# Patient Record
Sex: FEMALE | Race: OTHER | NOT HISPANIC OR LATINO | Employment: OTHER | RURAL
[De-identification: names, ages, dates, MRNs, and addresses within clinical notes are randomized per-mention and may not be internally consistent; named-entity substitution may affect disease eponyms.]

---

## 2018-09-25 ENCOUNTER — HISTORICAL (OUTPATIENT)
Dept: ADMINISTRATIVE | Facility: HOSPITAL | Age: 68
End: 2018-09-25

## 2018-09-27 LAB
LAB AP CLINICAL INFORMATION: NORMAL
LAB AP GENERAL CAT - HISTORICAL: NORMAL
LAB AP INTERPRETATION/RESULT - HISTORICAL: NORMAL
LAB AP SPECIMEN ADEQUACY - HISTORICAL: NORMAL
LAB AP SPECIMEN SUBMITTED - HISTORICAL: NORMAL

## 2018-10-19 ENCOUNTER — HISTORICAL (OUTPATIENT)
Dept: ADMINISTRATIVE | Facility: HOSPITAL | Age: 68
End: 2018-10-19

## 2018-10-22 LAB
LAB AP CLINICAL INFORMATION: NORMAL
LAB AP DIAGNOSIS - HISTORICAL: NORMAL
LAB AP GROSS PATHOLOGY - HISTORICAL: NORMAL
LAB AP SPECIMEN SUBMITTED - HISTORICAL: NORMAL

## 2018-12-13 ENCOUNTER — HISTORICAL (OUTPATIENT)
Dept: ADMINISTRATIVE | Facility: HOSPITAL | Age: 68
End: 2018-12-13

## 2018-12-14 LAB
LAB AP CLINICAL INFORMATION: NORMAL
LAB AP GENERAL CAT - HISTORICAL: NORMAL
LAB AP INTERPRETATION/RESULT - HISTORICAL: NEGATIVE
LAB AP SPECIMEN ADEQUACY - HISTORICAL: NORMAL
LAB AP SPECIMEN SUBMITTED - HISTORICAL: NORMAL

## 2020-06-25 ENCOUNTER — HISTORICAL (OUTPATIENT)
Dept: ADMINISTRATIVE | Facility: HOSPITAL | Age: 70
End: 2020-06-25

## 2020-06-25 LAB
BASOPHILS # BLD AUTO: 0.03 X10E3/UL (ref 0–0.2)
BASOPHILS NFR BLD AUTO: 0.8 % (ref 0–1)
EOSINOPHIL # BLD AUTO: 0.1 X10E3/UL (ref 0–0.5)
EOSINOPHIL NFR BLD AUTO: 2.7 % (ref 1–4)
ERYTHROCYTE [DISTWIDTH] IN BLOOD BY AUTOMATED COUNT: 12.9 % (ref 11.5–14.5)
ERYTHROCYTE [SEDIMENTATION RATE] IN BLOOD BY WESTERGREN METHOD: 9 MM/HR (ref 0–30)
HCT VFR BLD AUTO: 41.6 % (ref 38–47)
HGB BLD-MCNC: 13 G/DL (ref 12–16)
IMM GRANULOCYTES # BLD AUTO: 0.01 X10E3/UL (ref 0–0.04)
IMM GRANULOCYTES NFR BLD: 0.3 % (ref 0–0.4)
LYMPHOCYTES # BLD AUTO: 1.24 X10E3/UL (ref 1–4.8)
LYMPHOCYTES NFR BLD AUTO: 33.7 % (ref 27–41)
MCH RBC QN AUTO: 29.3 PG (ref 27–31)
MCHC RBC AUTO-ENTMCNC: 31.3 G/DL (ref 32–36)
MCV RBC AUTO: 93.9 FL (ref 80–96)
MONOCYTES # BLD AUTO: 0.37 X10E3/UL (ref 0–0.8)
MONOCYTES NFR BLD AUTO: 10.1 % (ref 2–6)
MPC BLD CALC-MCNC: 12.8 FL (ref 9.4–12.4)
NEUTROPHILS # BLD AUTO: 1.93 X10E3/UL (ref 1.8–7.7)
NEUTROPHILS NFR BLD AUTO: 52.4 % (ref 53–65)
NRBC # BLD AUTO: 0 X10E3/UL (ref 0–0)
NRBC, AUTO (.00): 0 /100 (ref 0–0)
PLATELET # BLD AUTO: 232 X10E3/UL (ref 150–400)
RBC # BLD AUTO: 4.43 X10E6/UL (ref 4.2–5.4)
WBC # BLD AUTO: 3.68 X10E3/UL (ref 4.5–11)

## 2020-06-30 LAB — ANA SER QL: NEGATIVE

## 2020-08-05 ENCOUNTER — HISTORICAL (OUTPATIENT)
Dept: ADMINISTRATIVE | Facility: HOSPITAL | Age: 70
End: 2020-08-05

## 2020-09-09 ENCOUNTER — HISTORICAL (OUTPATIENT)
Dept: ADMINISTRATIVE | Facility: HOSPITAL | Age: 70
End: 2020-09-09

## 2021-02-02 ENCOUNTER — HISTORICAL (OUTPATIENT)
Dept: ADMINISTRATIVE | Facility: HOSPITAL | Age: 71
End: 2021-02-02

## 2021-02-02 LAB
25(OH)D3 SERPL-MCNC: 25.1 NG/ML
ALBUMIN SERPL BCP-MCNC: 3.9 G/DL (ref 3.5–5)
ALBUMIN/GLOB SERPL: 1.1 {RATIO}
ALP SERPL-CCNC: 63 U/L (ref 55–142)
ALT SERPL W P-5'-P-CCNC: 15 U/L (ref 13–56)
ANION GAP SERPL CALCULATED.3IONS-SCNC: 10.1 MMOL/L (ref 7–16)
AST SERPL W P-5'-P-CCNC: 22 U/L (ref 15–37)
BACTERIA #/AREA URNS HPF: ABNORMAL /HPF
BASOPHILS # BLD AUTO: 0.04 X10E3/UL (ref 0–0.2)
BASOPHILS NFR BLD AUTO: 0.9 % (ref 0–1)
BILIRUB SERPL-MCNC: 0.5 MG/DL (ref 0–1.2)
BILIRUB UR QL STRIP: NEGATIVE MG/DL
BUN SERPL-MCNC: 17 MG/DL (ref 7–18)
BUN/CREAT SERPL: 20
CALCIUM SERPL-MCNC: 8.9 MG/DL (ref 8.5–10.1)
CHLORIDE SERPL-SCNC: 104 MMOL/L (ref 98–107)
CHOLEST SERPL-MCNC: 224 MG/DL
CHOLEST/HDLC SERPL: 3.3 {RATIO}
CLARITY UR: CLEAR
CO2 SERPL-SCNC: 30 MMOL/L (ref 21–32)
COLOR UR: YELLOW
CREAT SERPL-MCNC: 0.84 MG/DL (ref 0.5–1.02)
EOSINOPHIL # BLD AUTO: 0.22 X10E3/UL (ref 0–0.5)
EOSINOPHIL NFR BLD AUTO: 5 % (ref 1–4)
ERYTHROCYTE [DISTWIDTH] IN BLOOD BY AUTOMATED COUNT: 12.9 % (ref 11.5–14.5)
GLOBULIN SER-MCNC: 3.4 G/DL (ref 2–4)
GLUCOSE SERPL-MCNC: 94 MG/DL (ref 74–106)
GLUCOSE UR STRIP-MCNC: NEGATIVE MG/DL
HCT VFR BLD AUTO: 39.5 % (ref 38–47)
HDLC SERPL-MCNC: 67 MG/DL
HGB BLD-MCNC: 12.9 G/DL (ref 12–16)
IMM GRANULOCYTES # BLD AUTO: 0.01 X10E3/UL (ref 0–0.04)
IMM GRANULOCYTES NFR BLD: 0.2 % (ref 0–0.4)
KETONES UR STRIP-SCNC: NEGATIVE MG/DL
LDLC SERPL CALC-MCNC: 133 MG/DL
LEUKOCYTE ESTERASE UR QL STRIP: NEGATIVE LEU/UL
LYMPHOCYTES # BLD AUTO: 1.24 X10E3/UL (ref 1–4.8)
LYMPHOCYTES NFR BLD AUTO: 28.1 % (ref 27–41)
MCH RBC QN AUTO: 30.5 PG (ref 27–31)
MCHC RBC AUTO-ENTMCNC: 32.7 G/DL (ref 32–36)
MCV RBC AUTO: 93.4 FL (ref 80–96)
MONOCYTES # BLD AUTO: 0.53 X10E3/UL (ref 0–0.8)
MONOCYTES NFR BLD AUTO: 12 % (ref 2–6)
MPC BLD CALC-MCNC: 12.8 FL (ref 9.4–12.4)
MUCOUS THREADS #/AREA URNS HPF: ABNORMAL /HPF
NEUTROPHILS # BLD AUTO: 2.38 X10E3/UL (ref 1.8–7.7)
NEUTROPHILS NFR BLD AUTO: 53.8 % (ref 53–65)
NITRITE UR QL STRIP: NEGATIVE
NRBC # BLD AUTO: 0 X10E3/UL (ref 0–0)
NRBC, AUTO (.00): 0 /100 (ref 0–0)
PH UR STRIP: 7 PH UNITS (ref 5–8)
PLATELET # BLD AUTO: 215 X10E3/UL (ref 150–400)
POTASSIUM SERPL-SCNC: 4.1 MMOL/L (ref 3.5–5.1)
PROT SERPL-MCNC: 7.3 G/DL (ref 6.4–8.2)
PROT UR QL STRIP: NEGATIVE MG/DL
RBC # BLD AUTO: 4.23 X10E6/UL (ref 4.2–5.4)
RBC # UR STRIP: NEGATIVE ERY/UL
RBC #/AREA URNS HPF: ABNORMAL /HPF (ref 0–3)
SODIUM SERPL-SCNC: 140 MMOL/L (ref 136–145)
SP GR UR STRIP: 1.01 (ref 1–1.03)
SQUAMOUS #/AREA URNS LPF: ABNORMAL /LPF
TRICHOMONAS #/AREA URNS HPF: ABNORMAL /HPF
TRIGL SERPL-MCNC: 118 MG/DL
UROBILINOGEN UR STRIP-ACNC: 0.2 EU/DL
WBC # BLD AUTO: 4.42 X10E3/UL (ref 4.5–11)
WBC #/AREA URNS HPF: ABNORMAL /HPF (ref 0–5)
YEAST #/AREA URNS HPF: ABNORMAL /HPF

## 2021-02-05 LAB
INSULIN SERPL-ACNC: NORMAL U[IU]/ML
LAB AP CLINICAL INFORMATION: NORMAL
LAB AP GENERAL CAT - HISTORICAL: NORMAL
LAB AP INTERPRETATION/RESULT - HISTORICAL: NEGATIVE
LAB AP SPECIMEN ADEQUACY - HISTORICAL: NORMAL
LAB AP SPECIMEN SUBMITTED - HISTORICAL: NORMAL

## 2021-02-18 ENCOUNTER — HISTORICAL (OUTPATIENT)
Dept: ADMINISTRATIVE | Facility: HOSPITAL | Age: 71
End: 2021-02-18

## 2021-03-31 ENCOUNTER — OFFICE VISIT (OUTPATIENT)
Dept: INTERNAL MEDICINE | Facility: CLINIC | Age: 71
End: 2021-03-31
Payer: COMMERCIAL

## 2021-03-31 VITALS
HEART RATE: 65 BPM | DIASTOLIC BLOOD PRESSURE: 74 MMHG | BODY MASS INDEX: 30.9 KG/M2 | RESPIRATION RATE: 18 BRPM | SYSTOLIC BLOOD PRESSURE: 148 MMHG | WEIGHT: 174.38 LBS | OXYGEN SATURATION: 100 % | HEIGHT: 63 IN

## 2021-03-31 DIAGNOSIS — E78.5 HYPERLIPIDEMIA, UNSPECIFIED HYPERLIPIDEMIA TYPE: Chronic | ICD-10-CM

## 2021-03-31 DIAGNOSIS — I10 ESSENTIAL HYPERTENSION: Chronic | ICD-10-CM

## 2021-03-31 DIAGNOSIS — K21.9 GASTROESOPHAGEAL REFLUX DISEASE, UNSPECIFIED WHETHER ESOPHAGITIS PRESENT: Chronic | ICD-10-CM

## 2021-03-31 DIAGNOSIS — M62.838 MUSCLE SPASM: ICD-10-CM

## 2021-03-31 DIAGNOSIS — M54.50 BILATERAL LOW BACK PAIN WITHOUT SCIATICA, UNSPECIFIED CHRONICITY: Primary | ICD-10-CM

## 2021-03-31 PROCEDURE — 1101F PR PT FALLS ASSESS DOC 0-1 FALLS W/OUT INJ PAST YR: ICD-10-PCS | Mod: CPTII,,, | Performed by: INTERNAL MEDICINE

## 2021-03-31 PROCEDURE — 3288F PR FALLS RISK ASSESSMENT DOCUMENTED: ICD-10-PCS | Mod: CPTII,,, | Performed by: INTERNAL MEDICINE

## 2021-03-31 PROCEDURE — 3288F FALL RISK ASSESSMENT DOCD: CPT | Mod: CPTII,,, | Performed by: INTERNAL MEDICINE

## 2021-03-31 PROCEDURE — 3008F PR BODY MASS INDEX (BMI) DOCUMENTED: ICD-10-PCS | Mod: CPTII,,, | Performed by: INTERNAL MEDICINE

## 2021-03-31 PROCEDURE — 99214 OFFICE O/P EST MOD 30 MIN: CPT | Mod: S$PBB,,, | Performed by: INTERNAL MEDICINE

## 2021-03-31 PROCEDURE — 99999 PR PBB SHADOW E&M-EST. PATIENT-LVL IV: ICD-10-PCS | Mod: PBBFAC,,, | Performed by: INTERNAL MEDICINE

## 2021-03-31 PROCEDURE — 3078F PR MOST RECENT DIASTOLIC BLOOD PRESSURE < 80 MM HG: ICD-10-PCS | Mod: CPTII,,, | Performed by: INTERNAL MEDICINE

## 2021-03-31 PROCEDURE — 99999 PR PBB SHADOW E&M-EST. PATIENT-LVL IV: CPT | Mod: PBBFAC,,, | Performed by: INTERNAL MEDICINE

## 2021-03-31 PROCEDURE — 1159F MED LIST DOCD IN RCRD: CPT | Mod: ,,, | Performed by: INTERNAL MEDICINE

## 2021-03-31 PROCEDURE — 3077F SYST BP >= 140 MM HG: CPT | Mod: CPTII,,, | Performed by: INTERNAL MEDICINE

## 2021-03-31 PROCEDURE — 3008F BODY MASS INDEX DOCD: CPT | Mod: CPTII,,, | Performed by: INTERNAL MEDICINE

## 2021-03-31 PROCEDURE — 1125F AMNT PAIN NOTED PAIN PRSNT: CPT | Mod: ,,, | Performed by: INTERNAL MEDICINE

## 2021-03-31 PROCEDURE — 99214 OFFICE O/P EST MOD 30 MIN: CPT | Mod: PBBFAC | Performed by: INTERNAL MEDICINE

## 2021-03-31 PROCEDURE — 3077F PR MOST RECENT SYSTOLIC BLOOD PRESSURE >= 140 MM HG: ICD-10-PCS | Mod: CPTII,,, | Performed by: INTERNAL MEDICINE

## 2021-03-31 PROCEDURE — 1101F PT FALLS ASSESS-DOCD LE1/YR: CPT | Mod: CPTII,,, | Performed by: INTERNAL MEDICINE

## 2021-03-31 PROCEDURE — 99214 PR OFFICE/OUTPT VISIT, EST, LEVL IV, 30-39 MIN: ICD-10-PCS | Mod: S$PBB,,, | Performed by: INTERNAL MEDICINE

## 2021-03-31 PROCEDURE — 3078F DIAST BP <80 MM HG: CPT | Mod: CPTII,,, | Performed by: INTERNAL MEDICINE

## 2021-03-31 PROCEDURE — 1159F PR MEDICATION LIST DOCUMENTED IN MEDICAL RECORD: ICD-10-PCS | Mod: ,,, | Performed by: INTERNAL MEDICINE

## 2021-03-31 PROCEDURE — 1125F PR PAIN SEVERITY QUANTIFIED, PAIN PRESENT: ICD-10-PCS | Mod: ,,, | Performed by: INTERNAL MEDICINE

## 2021-03-31 RX ORDER — OMEPRAZOLE 40 MG/1
40 CAPSULE, DELAYED RELEASE ORAL DAILY
COMMUNITY
Start: 2021-01-09 | End: 2021-10-05 | Stop reason: SDUPTHER

## 2021-03-31 RX ORDER — NIFEDIPINE 60 MG/1
60 TABLET, EXTENDED RELEASE ORAL DAILY
COMMUNITY
Start: 2021-03-05 | End: 2021-09-09 | Stop reason: SDUPTHER

## 2021-03-31 RX ORDER — AMITRIPTYLINE HYDROCHLORIDE 25 MG/1
1 TABLET, FILM COATED ORAL NIGHTLY
COMMUNITY
Start: 2021-01-21 | End: 2021-08-26

## 2021-03-31 RX ORDER — CYCLOBENZAPRINE HCL 10 MG
10 TABLET ORAL 2 TIMES DAILY PRN
Qty: 20 TABLET | Refills: 0 | Status: SHIPPED | OUTPATIENT
Start: 2021-03-31 | End: 2021-04-10

## 2021-03-31 RX ORDER — ATORVASTATIN CALCIUM 10 MG/1
TABLET, FILM COATED ORAL
COMMUNITY
Start: 2021-01-21 | End: 2021-08-26

## 2021-03-31 RX ORDER — GABAPENTIN 300 MG/1
300 CAPSULE ORAL 3 TIMES DAILY
COMMUNITY
Start: 2021-03-05 | End: 2021-06-16 | Stop reason: SDUPTHER

## 2021-04-12 DIAGNOSIS — E55.9 VITAMIN D DEFICIENCY: Primary | ICD-10-CM

## 2021-04-14 ENCOUNTER — TELEPHONE (OUTPATIENT)
Dept: NEUROLOGY | Facility: CLINIC | Age: 71
End: 2021-04-14

## 2021-04-16 ENCOUNTER — TELEPHONE (OUTPATIENT)
Dept: NEUROLOGY | Facility: CLINIC | Age: 71
End: 2021-04-16

## 2021-05-13 ENCOUNTER — TELEPHONE (OUTPATIENT)
Dept: OBSTETRICS AND GYNECOLOGY | Facility: CLINIC | Age: 71
End: 2021-05-13

## 2021-05-18 ENCOUNTER — TELEPHONE (OUTPATIENT)
Dept: OBSTETRICS AND GYNECOLOGY | Facility: CLINIC | Age: 71
End: 2021-05-18

## 2021-06-10 RX ORDER — ESCITALOPRAM OXALATE 10 MG/1
TABLET ORAL
COMMUNITY
Start: 2021-05-06 | End: 2021-06-10 | Stop reason: SDUPTHER

## 2021-06-10 RX ORDER — ESCITALOPRAM OXALATE 10 MG/1
10 TABLET ORAL DAILY
Qty: 90 TABLET | Refills: 1 | Status: SHIPPED | OUTPATIENT
Start: 2021-06-10 | End: 2021-08-26 | Stop reason: SDUPTHER

## 2021-06-16 ENCOUNTER — OFFICE VISIT (OUTPATIENT)
Dept: NEUROLOGY | Facility: CLINIC | Age: 71
End: 2021-06-16
Payer: COMMERCIAL

## 2021-06-16 VITALS
SYSTOLIC BLOOD PRESSURE: 142 MMHG | DIASTOLIC BLOOD PRESSURE: 86 MMHG | WEIGHT: 179.5 LBS | HEIGHT: 63 IN | OXYGEN SATURATION: 98 % | HEART RATE: 62 BPM | BODY MASS INDEX: 31.8 KG/M2

## 2021-06-16 DIAGNOSIS — G25.81 RLS (RESTLESS LEGS SYNDROME): ICD-10-CM

## 2021-06-16 DIAGNOSIS — G62.9 POLYNEUROPATHY: Primary | ICD-10-CM

## 2021-06-16 PROCEDURE — 1159F MED LIST DOCD IN RCRD: CPT | Mod: ,,, | Performed by: NURSE PRACTITIONER

## 2021-06-16 PROCEDURE — 99214 OFFICE O/P EST MOD 30 MIN: CPT | Mod: PBBFAC | Performed by: NURSE PRACTITIONER

## 2021-06-16 PROCEDURE — 3008F PR BODY MASS INDEX (BMI) DOCUMENTED: ICD-10-PCS | Mod: CPTII,,, | Performed by: NURSE PRACTITIONER

## 2021-06-16 PROCEDURE — 1101F PT FALLS ASSESS-DOCD LE1/YR: CPT | Mod: CPTII,,, | Performed by: NURSE PRACTITIONER

## 2021-06-16 PROCEDURE — 1101F PR PT FALLS ASSESS DOC 0-1 FALLS W/OUT INJ PAST YR: ICD-10-PCS | Mod: CPTII,,, | Performed by: NURSE PRACTITIONER

## 2021-06-16 PROCEDURE — 99213 OFFICE O/P EST LOW 20 MIN: CPT | Mod: S$PBB,,, | Performed by: NURSE PRACTITIONER

## 2021-06-16 PROCEDURE — 3008F BODY MASS INDEX DOCD: CPT | Mod: CPTII,,, | Performed by: NURSE PRACTITIONER

## 2021-06-16 PROCEDURE — 3288F PR FALLS RISK ASSESSMENT DOCUMENTED: ICD-10-PCS | Mod: CPTII,,, | Performed by: NURSE PRACTITIONER

## 2021-06-16 PROCEDURE — 1159F PR MEDICATION LIST DOCUMENTED IN MEDICAL RECORD: ICD-10-PCS | Mod: ,,, | Performed by: NURSE PRACTITIONER

## 2021-06-16 PROCEDURE — 3288F FALL RISK ASSESSMENT DOCD: CPT | Mod: CPTII,,, | Performed by: NURSE PRACTITIONER

## 2021-06-16 PROCEDURE — 99213 PR OFFICE/OUTPT VISIT, EST, LEVL III, 20-29 MIN: ICD-10-PCS | Mod: S$PBB,,, | Performed by: NURSE PRACTITIONER

## 2021-06-16 RX ORDER — GABAPENTIN 300 MG/1
CAPSULE ORAL
Qty: 120 CAPSULE | Refills: 3 | Status: SHIPPED | OUTPATIENT
Start: 2021-06-16 | End: 2021-09-16 | Stop reason: ALTCHOICE

## 2021-08-26 ENCOUNTER — OFFICE VISIT (OUTPATIENT)
Dept: OBSTETRICS AND GYNECOLOGY | Facility: CLINIC | Age: 71
End: 2021-08-26
Payer: MEDICARE

## 2021-08-26 VITALS
WEIGHT: 174 LBS | DIASTOLIC BLOOD PRESSURE: 88 MMHG | TEMPERATURE: 97 F | SYSTOLIC BLOOD PRESSURE: 176 MMHG | HEIGHT: 63 IN | RESPIRATION RATE: 16 BRPM | HEART RATE: 61 BPM | BODY MASS INDEX: 30.83 KG/M2

## 2021-08-26 DIAGNOSIS — G62.9 NEUROPATHY: ICD-10-CM

## 2021-08-26 DIAGNOSIS — E55.9 VITAMIN D DEFICIENCY: ICD-10-CM

## 2021-08-26 DIAGNOSIS — M79.604 LEG PAIN, BILATERAL: Primary | ICD-10-CM

## 2021-08-26 DIAGNOSIS — M79.605 LEG PAIN, BILATERAL: Primary | ICD-10-CM

## 2021-08-26 LAB
25(OH)D3 SERPL-MCNC: 61.5 NG/ML
ALBUMIN SERPL BCP-MCNC: 3.8 G/DL (ref 3.5–5)
ALBUMIN/GLOB SERPL: 1 {RATIO}
ALP SERPL-CCNC: 55 U/L (ref 55–142)
ALT SERPL W P-5'-P-CCNC: 11 U/L (ref 13–56)
ANION GAP SERPL CALCULATED.3IONS-SCNC: 8 MMOL/L (ref 7–16)
AST SERPL W P-5'-P-CCNC: 19 U/L (ref 15–37)
BASOPHILS # BLD AUTO: 0.04 K/UL (ref 0–0.2)
BASOPHILS NFR BLD AUTO: 1 % (ref 0–1)
BILIRUB SERPL-MCNC: 0.5 MG/DL (ref 0–1.2)
BUN SERPL-MCNC: 12 MG/DL (ref 7–18)
BUN/CREAT SERPL: 15 (ref 6–20)
CALCIUM SERPL-MCNC: 9.3 MG/DL (ref 8.5–10.1)
CHLORIDE SERPL-SCNC: 105 MMOL/L (ref 98–107)
CO2 SERPL-SCNC: 32 MMOL/L (ref 21–32)
CREAT SERPL-MCNC: 0.82 MG/DL (ref 0.55–1.02)
DIFFERENTIAL METHOD BLD: ABNORMAL
EOSINOPHIL # BLD AUTO: 0.1 K/UL (ref 0–0.5)
EOSINOPHIL NFR BLD AUTO: 2.5 % (ref 1–4)
ERYTHROCYTE [DISTWIDTH] IN BLOOD BY AUTOMATED COUNT: 12.9 % (ref 11.5–14.5)
EST. AVERAGE GLUCOSE BLD GHB EST-MCNC: 84 MG/DL
GLOBULIN SER-MCNC: 4 G/DL (ref 2–4)
GLUCOSE SERPL-MCNC: 86 MG/DL (ref 74–106)
HBA1C MFR BLD HPLC: 5.1 % (ref 4.5–6.6)
HCT VFR BLD AUTO: 41.3 % (ref 38–47)
HGB BLD-MCNC: 13.4 G/DL (ref 12–16)
IMM GRANULOCYTES # BLD AUTO: 0.01 K/UL (ref 0–0.04)
IMM GRANULOCYTES NFR BLD: 0.3 % (ref 0–0.4)
LYMPHOCYTES # BLD AUTO: 1.31 K/UL (ref 1–4.8)
LYMPHOCYTES NFR BLD AUTO: 33.2 % (ref 27–41)
MCH RBC QN AUTO: 29.7 PG (ref 27–31)
MCHC RBC AUTO-ENTMCNC: 32.4 G/DL (ref 32–36)
MCV RBC AUTO: 91.6 FL (ref 80–96)
MONOCYTES # BLD AUTO: 0.4 K/UL (ref 0–0.8)
MONOCYTES NFR BLD AUTO: 10.2 % (ref 2–6)
MPC BLD CALC-MCNC: 12.3 FL (ref 9.4–12.4)
NEUTROPHILS # BLD AUTO: 2.08 K/UL (ref 1.8–7.7)
NEUTROPHILS NFR BLD AUTO: 52.8 % (ref 53–65)
NRBC # BLD AUTO: 0 X10E3/UL
NRBC, AUTO (.00): 0 %
PLATELET # BLD AUTO: 233 K/UL (ref 150–400)
POTASSIUM SERPL-SCNC: 3.8 MMOL/L (ref 3.5–5.1)
PROT SERPL-MCNC: 7.8 G/DL (ref 6.4–8.2)
RBC # BLD AUTO: 4.51 M/UL (ref 4.2–5.4)
SODIUM SERPL-SCNC: 141 MMOL/L (ref 136–145)
T4 FREE SERPL-MCNC: 0.92 NG/DL (ref 0.76–1.46)
TSH SERPL DL<=0.005 MIU/L-ACNC: 1.7 UIU/ML (ref 0.36–3.74)
WBC # BLD AUTO: 3.94 K/UL (ref 4.5–11)

## 2021-08-26 PROCEDURE — 99214 PR OFFICE/OUTPT VISIT, EST, LEVL IV, 30-39 MIN: ICD-10-PCS | Mod: ,,, | Performed by: OBSTETRICS & GYNECOLOGY

## 2021-08-26 PROCEDURE — 36415 PR COLLECTION VENOUS BLOOD,VENIPUNCTURE: ICD-10-PCS | Mod: ,,, | Performed by: OBSTETRICS & GYNECOLOGY

## 2021-08-26 PROCEDURE — 85025 CBC WITH DIFFERENTIAL: ICD-10-PCS | Mod: ,,, | Performed by: CLINICAL MEDICAL LABORATORY

## 2021-08-26 PROCEDURE — 84443 THYROID PANEL: ICD-10-PCS | Mod: GZ,,, | Performed by: CLINICAL MEDICAL LABORATORY

## 2021-08-26 PROCEDURE — 85025 COMPLETE CBC W/AUTO DIFF WBC: CPT | Mod: ,,, | Performed by: CLINICAL MEDICAL LABORATORY

## 2021-08-26 PROCEDURE — 83036 HEMOGLOBIN A1C: ICD-10-PCS | Mod: GZ,,, | Performed by: CLINICAL MEDICAL LABORATORY

## 2021-08-26 PROCEDURE — 82306 VITAMIN D: ICD-10-PCS | Mod: ,,, | Performed by: CLINICAL MEDICAL LABORATORY

## 2021-08-26 PROCEDURE — 82306 VITAMIN D 25 HYDROXY: CPT | Mod: ,,, | Performed by: CLINICAL MEDICAL LABORATORY

## 2021-08-26 PROCEDURE — 80053 COMPREHEN METABOLIC PANEL: CPT | Mod: ,,, | Performed by: CLINICAL MEDICAL LABORATORY

## 2021-08-26 PROCEDURE — 80053 COMPREHENSIVE METABOLIC PANEL: ICD-10-PCS | Mod: ,,, | Performed by: CLINICAL MEDICAL LABORATORY

## 2021-08-26 PROCEDURE — 99214 OFFICE O/P EST MOD 30 MIN: CPT | Mod: ,,, | Performed by: OBSTETRICS & GYNECOLOGY

## 2021-08-26 PROCEDURE — 83036 HEMOGLOBIN GLYCOSYLATED A1C: CPT | Mod: GZ,,, | Performed by: CLINICAL MEDICAL LABORATORY

## 2021-08-26 PROCEDURE — 36415 COLL VENOUS BLD VENIPUNCTURE: CPT | Mod: ,,, | Performed by: OBSTETRICS & GYNECOLOGY

## 2021-08-26 PROCEDURE — 84439 ASSAY OF FREE THYROXINE: CPT | Mod: GZ,,, | Performed by: CLINICAL MEDICAL LABORATORY

## 2021-08-26 PROCEDURE — 84443 ASSAY THYROID STIM HORMONE: CPT | Mod: GZ,,, | Performed by: CLINICAL MEDICAL LABORATORY

## 2021-08-26 PROCEDURE — 84439 THYROID PANEL: ICD-10-PCS | Mod: GZ,,, | Performed by: CLINICAL MEDICAL LABORATORY

## 2021-08-26 RX ORDER — ALENDRONATE SODIUM 70 MG/1
70 TABLET ORAL
COMMUNITY
End: 2021-08-26 | Stop reason: SDUPTHER

## 2021-08-26 RX ORDER — ESCITALOPRAM OXALATE 10 MG/1
10 TABLET ORAL DAILY
Qty: 90 TABLET | Refills: 3 | Status: SHIPPED | OUTPATIENT
Start: 2021-08-26 | End: 2022-03-10

## 2021-08-26 RX ORDER — DICLOFENAC POTASSIUM 50 MG/1
50 TABLET, FILM COATED ORAL 4 TIMES DAILY
Qty: 120 TABLET | Refills: 1 | Status: SHIPPED | OUTPATIENT
Start: 2021-08-26 | End: 2021-09-25

## 2021-08-26 RX ORDER — ALENDRONATE SODIUM 70 MG/1
70 TABLET ORAL
Qty: 12 TABLET | Refills: 3 | Status: SHIPPED | OUTPATIENT
Start: 2021-08-26 | End: 2022-06-01 | Stop reason: SDUPTHER

## 2021-09-02 ENCOUNTER — OFFICE VISIT (OUTPATIENT)
Dept: OBSTETRICS AND GYNECOLOGY | Facility: CLINIC | Age: 71
End: 2021-09-02
Payer: MEDICARE

## 2021-09-02 VITALS
BODY MASS INDEX: 31.01 KG/M2 | WEIGHT: 175 LBS | RESPIRATION RATE: 16 BRPM | TEMPERATURE: 97 F | SYSTOLIC BLOOD PRESSURE: 169 MMHG | DIASTOLIC BLOOD PRESSURE: 94 MMHG | HEART RATE: 59 BPM | HEIGHT: 63 IN

## 2021-09-02 DIAGNOSIS — M79.606 LEG PAIN, DIFFUSE, UNSPECIFIED LATERALITY: Primary | ICD-10-CM

## 2021-09-02 DIAGNOSIS — D72.819 LEUKOPENIA, UNSPECIFIED TYPE: ICD-10-CM

## 2021-09-02 PROCEDURE — 1159F PR MEDICATION LIST DOCUMENTED IN MEDICAL RECORD: ICD-10-PCS | Mod: CPTII,,, | Performed by: OBSTETRICS & GYNECOLOGY

## 2021-09-02 PROCEDURE — 3044F HG A1C LEVEL LT 7.0%: CPT | Mod: CPTII,,, | Performed by: OBSTETRICS & GYNECOLOGY

## 2021-09-02 PROCEDURE — 3077F PR MOST RECENT SYSTOLIC BLOOD PRESSURE >= 140 MM HG: ICD-10-PCS | Mod: CPTII,,, | Performed by: OBSTETRICS & GYNECOLOGY

## 2021-09-02 PROCEDURE — 99214 PR OFFICE/OUTPT VISIT, EST, LEVL IV, 30-39 MIN: ICD-10-PCS | Mod: ,,, | Performed by: OBSTETRICS & GYNECOLOGY

## 2021-09-02 PROCEDURE — 3080F PR MOST RECENT DIASTOLIC BLOOD PRESSURE >= 90 MM HG: ICD-10-PCS | Mod: CPTII,,, | Performed by: OBSTETRICS & GYNECOLOGY

## 2021-09-02 PROCEDURE — 1160F PR REVIEW ALL MEDS BY PRESCRIBER/CLIN PHARMACIST DOCUMENTED: ICD-10-PCS | Mod: CPTII,,, | Performed by: OBSTETRICS & GYNECOLOGY

## 2021-09-02 PROCEDURE — 3080F DIAST BP >= 90 MM HG: CPT | Mod: CPTII,,, | Performed by: OBSTETRICS & GYNECOLOGY

## 2021-09-02 PROCEDURE — 3044F PR MOST RECENT HEMOGLOBIN A1C LEVEL <7.0%: ICD-10-PCS | Mod: CPTII,,, | Performed by: OBSTETRICS & GYNECOLOGY

## 2021-09-02 PROCEDURE — 3008F BODY MASS INDEX DOCD: CPT | Mod: CPTII,,, | Performed by: OBSTETRICS & GYNECOLOGY

## 2021-09-02 PROCEDURE — 1160F RVW MEDS BY RX/DR IN RCRD: CPT | Mod: CPTII,,, | Performed by: OBSTETRICS & GYNECOLOGY

## 2021-09-02 PROCEDURE — 3077F SYST BP >= 140 MM HG: CPT | Mod: CPTII,,, | Performed by: OBSTETRICS & GYNECOLOGY

## 2021-09-02 PROCEDURE — 99214 OFFICE O/P EST MOD 30 MIN: CPT | Mod: ,,, | Performed by: OBSTETRICS & GYNECOLOGY

## 2021-09-02 PROCEDURE — 1159F MED LIST DOCD IN RCRD: CPT | Mod: CPTII,,, | Performed by: OBSTETRICS & GYNECOLOGY

## 2021-09-02 PROCEDURE — 3008F PR BODY MASS INDEX (BMI) DOCUMENTED: ICD-10-PCS | Mod: CPTII,,, | Performed by: OBSTETRICS & GYNECOLOGY

## 2021-09-09 RX ORDER — NIFEDIPINE 60 MG/1
60 TABLET, EXTENDED RELEASE ORAL DAILY
Qty: 30 TABLET | Refills: 0 | Status: SHIPPED | OUTPATIENT
Start: 2021-09-09 | End: 2021-10-05 | Stop reason: SDUPTHER

## 2021-09-14 ENCOUNTER — HOSPITAL ENCOUNTER (OUTPATIENT)
Dept: RADIOLOGY | Facility: HOSPITAL | Age: 71
Discharge: HOME | End: 2021-09-14
Payer: MEDICARE

## 2021-09-14 DIAGNOSIS — Z12.31 ENCOUNTER FOR SCREENING MAMMOGRAM FOR MALIGNANT NEOPLASM OF BREAST: ICD-10-CM

## 2021-09-14 PROCEDURE — 77067 SCR MAMMO BI INCL CAD: CPT | Mod: TC

## 2021-09-16 ENCOUNTER — OFFICE VISIT (OUTPATIENT)
Dept: NEUROLOGY | Facility: CLINIC | Age: 71
End: 2021-09-16
Payer: MEDICARE

## 2021-09-16 VITALS
BODY MASS INDEX: 31.47 KG/M2 | HEART RATE: 58 BPM | HEIGHT: 63 IN | SYSTOLIC BLOOD PRESSURE: 144 MMHG | OXYGEN SATURATION: 99 % | WEIGHT: 177.63 LBS | DIASTOLIC BLOOD PRESSURE: 72 MMHG

## 2021-09-16 DIAGNOSIS — G25.81 RLS (RESTLESS LEGS SYNDROME): ICD-10-CM

## 2021-09-16 DIAGNOSIS — G56.03 BILATERAL CARPAL TUNNEL SYNDROME: ICD-10-CM

## 2021-09-16 DIAGNOSIS — G62.9 POLYNEUROPATHY: Primary | ICD-10-CM

## 2021-09-16 PROCEDURE — 3288F PR FALLS RISK ASSESSMENT DOCUMENTED: ICD-10-PCS | Mod: CPTII,,, | Performed by: NURSE PRACTITIONER

## 2021-09-16 PROCEDURE — 1101F PT FALLS ASSESS-DOCD LE1/YR: CPT | Mod: CPTII,,, | Performed by: NURSE PRACTITIONER

## 2021-09-16 PROCEDURE — 3078F PR MOST RECENT DIASTOLIC BLOOD PRESSURE < 80 MM HG: ICD-10-PCS | Mod: CPTII,,, | Performed by: NURSE PRACTITIONER

## 2021-09-16 PROCEDURE — 1160F RVW MEDS BY RX/DR IN RCRD: CPT | Mod: CPTII,,, | Performed by: NURSE PRACTITIONER

## 2021-09-16 PROCEDURE — 1160F PR REVIEW ALL MEDS BY PRESCRIBER/CLIN PHARMACIST DOCUMENTED: ICD-10-PCS | Mod: CPTII,,, | Performed by: NURSE PRACTITIONER

## 2021-09-16 PROCEDURE — 1159F PR MEDICATION LIST DOCUMENTED IN MEDICAL RECORD: ICD-10-PCS | Mod: CPTII,,, | Performed by: NURSE PRACTITIONER

## 2021-09-16 PROCEDURE — 3288F FALL RISK ASSESSMENT DOCD: CPT | Mod: CPTII,,, | Performed by: NURSE PRACTITIONER

## 2021-09-16 PROCEDURE — 1159F MED LIST DOCD IN RCRD: CPT | Mod: CPTII,,, | Performed by: NURSE PRACTITIONER

## 2021-09-16 PROCEDURE — 3077F SYST BP >= 140 MM HG: CPT | Mod: CPTII,,, | Performed by: NURSE PRACTITIONER

## 2021-09-16 PROCEDURE — 3008F BODY MASS INDEX DOCD: CPT | Mod: CPTII,,, | Performed by: NURSE PRACTITIONER

## 2021-09-16 PROCEDURE — 3078F DIAST BP <80 MM HG: CPT | Mod: CPTII,,, | Performed by: NURSE PRACTITIONER

## 2021-09-16 PROCEDURE — 3077F PR MOST RECENT SYSTOLIC BLOOD PRESSURE >= 140 MM HG: ICD-10-PCS | Mod: CPTII,,, | Performed by: NURSE PRACTITIONER

## 2021-09-16 PROCEDURE — 99215 OFFICE O/P EST HI 40 MIN: CPT | Mod: PBBFAC | Performed by: NURSE PRACTITIONER

## 2021-09-16 PROCEDURE — 3044F HG A1C LEVEL LT 7.0%: CPT | Mod: CPTII,,, | Performed by: NURSE PRACTITIONER

## 2021-09-16 PROCEDURE — 3044F PR MOST RECENT HEMOGLOBIN A1C LEVEL <7.0%: ICD-10-PCS | Mod: CPTII,,, | Performed by: NURSE PRACTITIONER

## 2021-09-16 PROCEDURE — 99213 PR OFFICE/OUTPT VISIT, EST, LEVL III, 20-29 MIN: ICD-10-PCS | Mod: S$PBB,,, | Performed by: NURSE PRACTITIONER

## 2021-09-16 PROCEDURE — 3008F PR BODY MASS INDEX (BMI) DOCUMENTED: ICD-10-PCS | Mod: CPTII,,, | Performed by: NURSE PRACTITIONER

## 2021-09-16 PROCEDURE — 99213 OFFICE O/P EST LOW 20 MIN: CPT | Mod: S$PBB,,, | Performed by: NURSE PRACTITIONER

## 2021-09-16 PROCEDURE — 1101F PR PT FALLS ASSESS DOC 0-1 FALLS W/OUT INJ PAST YR: ICD-10-PCS | Mod: CPTII,,, | Performed by: NURSE PRACTITIONER

## 2021-09-16 RX ORDER — CARBIDOPA AND LEVODOPA 25; 100 MG/1; MG/1
TABLET ORAL
COMMUNITY
Start: 2021-05-26 | End: 2021-09-16

## 2021-09-16 RX ORDER — ATORVASTATIN CALCIUM 20 MG/1
TABLET, FILM COATED ORAL
COMMUNITY
Start: 2021-04-26

## 2021-09-16 RX ORDER — PREGABALIN 75 MG/1
CAPSULE ORAL
Qty: 60 CAPSULE | Refills: 3 | Status: SHIPPED | OUTPATIENT
Start: 2021-09-16 | End: 2021-12-20 | Stop reason: SDUPTHER

## 2021-10-05 ENCOUNTER — OFFICE VISIT (OUTPATIENT)
Dept: FAMILY MEDICINE | Facility: CLINIC | Age: 71
End: 2021-10-05
Payer: MEDICARE

## 2021-10-05 VITALS
DIASTOLIC BLOOD PRESSURE: 75 MMHG | RESPIRATION RATE: 20 BRPM | SYSTOLIC BLOOD PRESSURE: 143 MMHG | HEART RATE: 67 BPM | OXYGEN SATURATION: 96 % | HEIGHT: 63 IN | WEIGHT: 178 LBS | BODY MASS INDEX: 31.54 KG/M2

## 2021-10-05 DIAGNOSIS — K21.9 GASTROESOPHAGEAL REFLUX DISEASE, UNSPECIFIED WHETHER ESOPHAGITIS PRESENT: ICD-10-CM

## 2021-10-05 DIAGNOSIS — I10 HYPERTENSION, UNSPECIFIED TYPE: Primary | ICD-10-CM

## 2021-10-05 DIAGNOSIS — E78.5 DYSLIPIDEMIA: ICD-10-CM

## 2021-10-05 PROCEDURE — 3008F PR BODY MASS INDEX (BMI) DOCUMENTED: ICD-10-PCS | Mod: ,,, | Performed by: INTERNAL MEDICINE

## 2021-10-05 PROCEDURE — 3044F HG A1C LEVEL LT 7.0%: CPT | Mod: ,,, | Performed by: INTERNAL MEDICINE

## 2021-10-05 PROCEDURE — 99213 PR OFFICE/OUTPT VISIT, EST, LEVL III, 20-29 MIN: ICD-10-PCS | Mod: ,,, | Performed by: INTERNAL MEDICINE

## 2021-10-05 PROCEDURE — 3078F DIAST BP <80 MM HG: CPT | Mod: ,,, | Performed by: INTERNAL MEDICINE

## 2021-10-05 PROCEDURE — 3044F PR MOST RECENT HEMOGLOBIN A1C LEVEL <7.0%: ICD-10-PCS | Mod: ,,, | Performed by: INTERNAL MEDICINE

## 2021-10-05 PROCEDURE — 3078F PR MOST RECENT DIASTOLIC BLOOD PRESSURE < 80 MM HG: ICD-10-PCS | Mod: ,,, | Performed by: INTERNAL MEDICINE

## 2021-10-05 PROCEDURE — 3077F SYST BP >= 140 MM HG: CPT | Mod: ,,, | Performed by: INTERNAL MEDICINE

## 2021-10-05 PROCEDURE — 1101F PT FALLS ASSESS-DOCD LE1/YR: CPT | Mod: ,,, | Performed by: INTERNAL MEDICINE

## 2021-10-05 PROCEDURE — 3288F FALL RISK ASSESSMENT DOCD: CPT | Mod: ,,, | Performed by: INTERNAL MEDICINE

## 2021-10-05 PROCEDURE — 3008F BODY MASS INDEX DOCD: CPT | Mod: ,,, | Performed by: INTERNAL MEDICINE

## 2021-10-05 PROCEDURE — 1160F RVW MEDS BY RX/DR IN RCRD: CPT | Mod: ,,, | Performed by: INTERNAL MEDICINE

## 2021-10-05 PROCEDURE — 1101F PR PT FALLS ASSESS DOC 0-1 FALLS W/OUT INJ PAST YR: ICD-10-PCS | Mod: ,,, | Performed by: INTERNAL MEDICINE

## 2021-10-05 PROCEDURE — 1159F MED LIST DOCD IN RCRD: CPT | Mod: ,,, | Performed by: INTERNAL MEDICINE

## 2021-10-05 PROCEDURE — 1159F PR MEDICATION LIST DOCUMENTED IN MEDICAL RECORD: ICD-10-PCS | Mod: ,,, | Performed by: INTERNAL MEDICINE

## 2021-10-05 PROCEDURE — 1160F PR REVIEW ALL MEDS BY PRESCRIBER/CLIN PHARMACIST DOCUMENTED: ICD-10-PCS | Mod: ,,, | Performed by: INTERNAL MEDICINE

## 2021-10-05 PROCEDURE — 3077F PR MOST RECENT SYSTOLIC BLOOD PRESSURE >= 140 MM HG: ICD-10-PCS | Mod: ,,, | Performed by: INTERNAL MEDICINE

## 2021-10-05 PROCEDURE — 99213 OFFICE O/P EST LOW 20 MIN: CPT | Mod: ,,, | Performed by: INTERNAL MEDICINE

## 2021-10-05 PROCEDURE — 3288F PR FALLS RISK ASSESSMENT DOCUMENTED: ICD-10-PCS | Mod: ,,, | Performed by: INTERNAL MEDICINE

## 2021-10-05 RX ORDER — OMEPRAZOLE 40 MG/1
40 CAPSULE, DELAYED RELEASE ORAL DAILY
Qty: 90 CAPSULE | Refills: 1 | Status: SHIPPED | OUTPATIENT
Start: 2021-10-05 | End: 2022-06-14 | Stop reason: SDUPTHER

## 2021-10-05 RX ORDER — NIFEDIPINE 60 MG/1
60 TABLET, EXTENDED RELEASE ORAL DAILY
Qty: 90 TABLET | Refills: 1 | Status: SHIPPED | OUTPATIENT
Start: 2021-10-05 | End: 2022-04-19

## 2021-11-18 ENCOUNTER — CLINICAL SUPPORT (OUTPATIENT)
Dept: OBSTETRICS AND GYNECOLOGY | Facility: CLINIC | Age: 71
End: 2021-11-18
Payer: MEDICARE

## 2021-11-18 DIAGNOSIS — D72.819 LEUKOPENIA, UNSPECIFIED TYPE: Primary | ICD-10-CM

## 2021-12-20 ENCOUNTER — OFFICE VISIT (OUTPATIENT)
Dept: NEUROLOGY | Facility: CLINIC | Age: 71
End: 2021-12-20
Payer: MEDICARE

## 2021-12-20 VITALS
DIASTOLIC BLOOD PRESSURE: 62 MMHG | WEIGHT: 180.5 LBS | HEIGHT: 63 IN | HEART RATE: 70 BPM | OXYGEN SATURATION: 98 % | SYSTOLIC BLOOD PRESSURE: 116 MMHG | BODY MASS INDEX: 31.98 KG/M2

## 2021-12-20 DIAGNOSIS — G56.03 BILATERAL CARPAL TUNNEL SYNDROME: Chronic | ICD-10-CM

## 2021-12-20 DIAGNOSIS — G62.9 POLYNEUROPATHY: Primary | Chronic | ICD-10-CM

## 2021-12-20 DIAGNOSIS — G25.81 RLS (RESTLESS LEGS SYNDROME): Chronic | ICD-10-CM

## 2021-12-20 PROBLEM — G47.33 OBSTRUCTIVE SLEEP APNEA: Status: ACTIVE | Noted: 2021-12-20

## 2021-12-20 PROBLEM — E66.9 OBESITY: Status: ACTIVE | Noted: 2021-12-20

## 2021-12-20 PROBLEM — M06.9 RHEUMATOID ARTHRITIS: Status: ACTIVE | Noted: 2021-12-20

## 2021-12-20 PROBLEM — I10 HYPERTENSION: Status: ACTIVE | Noted: 2021-12-20

## 2021-12-20 PROCEDURE — 99215 OFFICE O/P EST HI 40 MIN: CPT | Mod: PBBFAC | Performed by: NURSE PRACTITIONER

## 2021-12-20 PROCEDURE — 99214 OFFICE O/P EST MOD 30 MIN: CPT | Mod: S$PBB,,, | Performed by: NURSE PRACTITIONER

## 2021-12-20 PROCEDURE — 99214 PR OFFICE/OUTPT VISIT, EST, LEVL IV, 30-39 MIN: ICD-10-PCS | Mod: S$PBB,,, | Performed by: NURSE PRACTITIONER

## 2021-12-20 RX ORDER — PREGABALIN 75 MG/1
CAPSULE ORAL
Qty: 90 CAPSULE | Refills: 3 | Status: SHIPPED | OUTPATIENT
Start: 2021-12-20 | End: 2022-03-24 | Stop reason: SDUPTHER

## 2021-12-28 ENCOUNTER — TELEPHONE (OUTPATIENT)
Dept: NEUROLOGY | Facility: CLINIC | Age: 71
End: 2021-12-28
Payer: MEDICARE

## 2022-01-06 ENCOUNTER — HOSPITAL ENCOUNTER (OUTPATIENT)
Dept: RADIOLOGY | Facility: HOSPITAL | Age: 72
Discharge: HOME | End: 2022-01-06
Attending: ORTHOPAEDIC SURGERY
Payer: MEDICARE

## 2022-01-06 DIAGNOSIS — M25.531 BILATERAL WRIST PAIN: ICD-10-CM

## 2022-01-06 DIAGNOSIS — M25.511 ACUTE PAIN OF RIGHT SHOULDER: ICD-10-CM

## 2022-01-06 DIAGNOSIS — M25.532 BILATERAL WRIST PAIN: ICD-10-CM

## 2022-01-06 PROCEDURE — 73110 X-RAY EXAM OF WRIST: CPT | Mod: TC,50

## 2022-01-06 PROCEDURE — 73030 X-RAY EXAM OF SHOULDER: CPT | Mod: TC,RT

## 2022-01-06 NOTE — H&P (VIEW-ONLY)
CC:   Chief Complaint   Patient presents with    Post-op Evaluation     BILATERAL CTS        PREVIOUS INFO:  Status post left carpal tunnel release 2017 Dr. Cota        HISTORY:   2022    Chrissie Cunningham  is a 71 y.o. patient comes in she has had the hip below EMG is but her symptoms she describes her right thumb triggering denies lot of numbness and tingling being much of an issue here is mainly triggering of her thumb it was doing it this morning not doing it right now      PAST MEDICAL HISTORY:   Past Medical History:   Diagnosis Date    Arthritis     Benign lipomatous neoplasm of skin and subcutaneous tissue of other sites 2007    Left upper back lipoma - large - resected 2006 at [facility] by Dr. Church    BV (bacterial vaginosis) 2011    Cataract     Chronic fibrocystic breast disease (FCBD) in female 2007    chronic bilaterally    GERD (gastroesophageal reflux disease)     Hypercholesterolemia 2004    Cholesterol 242 mg/dl    Hyperlipidemia     Hyperlipidemia 2004     mg/dl;  HDL 66 mg/dl;   mg/dl    Hypertension     Keloid of skin 2020    probably from scratching;  left and right side of mons pubis    Neuropathy     OAB (overactive bladder) 2017    by Urodynamics Study    Osteopenia 2013    as noted on 10/2011    Spinal stenosis 10/08/2013    on the left on neck x-ray    Spondylosis 10/08/2013    Stress incontinence, female 2017    by Urodynamics Study    Urinary incontinence, mixed 2017    by Urodynamics Study          PAST SURGICAL HISTORY:   Past Surgical History:   Procedure Laterality Date     SECTION      CYST REMOVAL      EYE SURGERY      HYSTERECTOMY            ALLERGIES:   Review of patient's allergies indicates:   Allergen Reactions    Fish containing products         MEDICATIONS :    Current Outpatient Medications:     acyclovir (ZOVIRAX) 400 MG tablet, Take 1  tablet by mouth once daily, Disp: 90 tablet, Rfl: 0    alendronate (FOSAMAX) 70 MG tablet, Take 1 tablet (70 mg total) by mouth every 7 days., Disp: 12 tablet, Rfl: 3    atorvastatin (LIPITOR) 20 MG tablet, , Disp: , Rfl:     DICLOFENAC POTASSIUM ORAL, Take 50 mg by mouth 4 (four) times daily., Disp: , Rfl:     EScitalopram oxalate (LEXAPRO) 10 MG tablet, Take 1 tablet (10 mg total) by mouth once daily., Disp: 90 tablet, Rfl: 3    NIFEdipine (ADALAT CC) 60 MG TbSR, Take 1 tablet (60 mg total) by mouth once daily., Disp: 90 tablet, Rfl: 1    omeprazole (PRILOSEC) 40 MG capsule, Take 1 capsule (40 mg total) by mouth once daily., Disp: 90 capsule, Rfl: 1    pregabalin (LYRICA) 75 MG capsule, Take one capsule three times a day, Disp: 90 capsule, Rfl: 3     SOCIAL HISTORY:   Social History     Socioeconomic History    Marital status:    Tobacco Use    Smoking status: Never Smoker    Smokeless tobacco: Never Used   Substance and Sexual Activity    Alcohol use: Never    Drug use: Never    Sexual activity: Not Currently        ROS    FAMILY HISTORY:   Family History   Problem Relation Age of Onset    Prostate cancer Father     Diabetes Father     Hypertension Father     Hypertension Mother     Breast cancer Sister     Breast cancer Sister     Hyperlipidemia Neg Hx     Stroke Neg Hx           PHYSICAL EXAM: There were no vitals filed for this visit.            There is no height or weight on file to calculate BMI.     In general, this is a well-developed, well-nourished female . The patient is alert, oriented and cooperative.      HEENT:  Normocephalic, atraumatic.  Extraocular movements are intact bilaterally.  The oropharynx is benign.       NECK:  Nontender with good range of motion.      PULMONARY: Respirations are even and non-labored.       CARDIOVASCULAR: Pulses regular by peripheral palpation.       ABDOMEN:  Soft, non-tender, non-distended.        EXTREMITIES:  Easily palpable appears to  be cyst at the A1 pulley the right thumb    Ortho Exam      RADIOGRAPHIC FINDINGS:  X-rays bilateral wrist show significant degenerative changes 1st metacarpal carpal joint and also the radioscaphoid joint.  No fracture appreciated    EMGs at [facility] dated 07/12/2021 impression was predominantly sensory peripheral neuropathy   2. Bilateral median nerve entrapment neuropathy at the carpal tunnel left greater than right   3. Normal EMG is bilateral lower extremities   .      IMPRESSION: Bilateral carpal tunnel syndrome  -     Ambulatory referral/consult to Orthopedics     Discussed with her she has neuropathy done appeared to be having bad carpal tunnel symptoms she has triggering of her right thumb she also has DJD she does desire proceed with a right thumb trigger thumb release    We are also going to order x-rays of her right shoulder on the way out has been giving her troubles.    PLAN:  Right trigger thumb release.  There are no Patient Instructions on file for this visit.      No follow-ups on file.         Jayant Mandujano III      (Subject to voice recognition error, transcription service not allowed)

## 2022-01-13 ENCOUNTER — TELEPHONE (OUTPATIENT)
Dept: NEUROLOGY | Facility: CLINIC | Age: 72
End: 2022-01-13
Payer: MEDICARE

## 2022-01-13 NOTE — TELEPHONE ENCOUNTER
Pt calls complaining of a rash around and inside her mouth.She states she experiences pain when trying to wear her dentures. For this reason she is unable to wear them. She feels the rash is due to the Lyrica 75 mg that she takes tid. Spoke with Dr Pack and received order to taper pt off the Lyrica. Pt is to take 1 tablet bid times 2 days, then 1 tablet at bedtime for 2 days then discontinue.These orders were relayed to pt. Instructed pt to follow up with her PCP and to call the  office back on Monday (1/17/2022) with update. Pt voiced understanding by verbal return.

## 2022-01-20 ENCOUNTER — CLINICAL SUPPORT (OUTPATIENT)
Dept: CARDIOLOGY | Facility: CLINIC | Age: 72
End: 2022-01-20
Payer: MEDICARE

## 2022-01-20 ENCOUNTER — HOSPITAL ENCOUNTER (OUTPATIENT)
Dept: RADIOLOGY | Facility: HOSPITAL | Age: 72
Discharge: HOME | End: 2022-01-20
Attending: ORTHOPAEDIC SURGERY
Payer: MEDICARE

## 2022-01-20 DIAGNOSIS — Z01.810 PRE-OPERATIVE CARDIOVASCULAR EXAMINATION: ICD-10-CM

## 2022-01-20 DIAGNOSIS — Z01.811 PRE-OPERATIVE RESPIRATORY EXAMINATION: ICD-10-CM

## 2022-01-20 PROCEDURE — 93005 ELECTROCARDIOGRAM TRACING: CPT | Mod: PBBFAC | Performed by: INTERNAL MEDICINE

## 2022-01-20 PROCEDURE — 71046 X-RAY EXAM CHEST 2 VIEWS: CPT | Mod: 26,,, | Performed by: RADIOLOGY

## 2022-01-20 PROCEDURE — 93010 ELECTROCARDIOGRAM REPORT: CPT | Mod: S$PBB,,, | Performed by: INTERNAL MEDICINE

## 2022-01-20 PROCEDURE — 93010 EKG 12-LEAD: ICD-10-PCS | Mod: S$PBB,,, | Performed by: INTERNAL MEDICINE

## 2022-01-20 PROCEDURE — 71046 X-RAY EXAM CHEST 2 VIEWS: CPT | Mod: TC

## 2022-01-20 PROCEDURE — 71046 XR CHEST PA AND LATERAL: ICD-10-PCS | Mod: 26,,, | Performed by: RADIOLOGY

## 2022-01-20 PROCEDURE — 99212 OFFICE O/P EST SF 10 MIN: CPT | Mod: PBBFAC,25

## 2022-01-21 RX ORDER — DICLOFENAC POTASSIUM 50 MG/1
TABLET, FILM COATED ORAL
COMMUNITY
Start: 2021-09-28 | End: 2022-04-26

## 2022-01-24 ENCOUNTER — ANESTHESIA EVENT (OUTPATIENT)
Dept: SURGERY | Facility: HOSPITAL | Age: 72
End: 2022-01-24
Payer: MEDICARE

## 2022-01-24 ENCOUNTER — HOSPITAL ENCOUNTER (OUTPATIENT)
Facility: HOSPITAL | Age: 72
Discharge: HOME | End: 2022-01-24
Attending: ORTHOPAEDIC SURGERY | Admitting: ORTHOPAEDIC SURGERY
Payer: MEDICARE

## 2022-01-24 ENCOUNTER — ANESTHESIA (OUTPATIENT)
Dept: SURGERY | Facility: HOSPITAL | Age: 72
End: 2022-01-24
Payer: MEDICARE

## 2022-01-24 VITALS
HEIGHT: 63 IN | TEMPERATURE: 98 F | SYSTOLIC BLOOD PRESSURE: 126 MMHG | OXYGEN SATURATION: 99 % | RESPIRATION RATE: 16 BRPM | BODY MASS INDEX: 30.65 KG/M2 | WEIGHT: 173 LBS | DIASTOLIC BLOOD PRESSURE: 66 MMHG | HEART RATE: 89 BPM

## 2022-01-24 DIAGNOSIS — M65.311 TRIGGER FINGER OF RIGHT THUMB: Primary | ICD-10-CM

## 2022-01-24 DIAGNOSIS — M65.30 TRIGGER FINGER: ICD-10-CM

## 2022-01-24 LAB — SARS-COV-2 RDRP RESP QL NAA+PROBE: NEGATIVE

## 2022-01-24 PROCEDURE — 27000510 HC BLANKET BAIR HUGGER ANY SIZE: Performed by: ANESTHESIOLOGY

## 2022-01-24 PROCEDURE — 37000009 HC ANESTHESIA EA ADD 15 MINS: Performed by: ORTHOPAEDIC SURGERY

## 2022-01-24 PROCEDURE — 71000016 HC POSTOP RECOV ADDL HR: Performed by: ORTHOPAEDIC SURGERY

## 2022-01-24 PROCEDURE — 25000003 PHARM REV CODE 250: Performed by: NURSE ANESTHETIST, CERTIFIED REGISTERED

## 2022-01-24 PROCEDURE — D9220A PRA ANESTHESIA: Mod: CRNA,,, | Performed by: NURSE ANESTHETIST, CERTIFIED REGISTERED

## 2022-01-24 PROCEDURE — D9220A PRA ANESTHESIA: ICD-10-PCS | Mod: ANES,,, | Performed by: ANESTHESIOLOGY

## 2022-01-24 PROCEDURE — D9220A PRA ANESTHESIA: ICD-10-PCS | Mod: CRNA,,, | Performed by: NURSE ANESTHETIST, CERTIFIED REGISTERED

## 2022-01-24 PROCEDURE — D9220A PRA ANESTHESIA: Mod: ANES,,, | Performed by: ANESTHESIOLOGY

## 2022-01-24 PROCEDURE — 25000003 PHARM REV CODE 250: Performed by: ORTHOPAEDIC SURGERY

## 2022-01-24 PROCEDURE — 63600175 PHARM REV CODE 636 W HCPCS: Performed by: ANESTHESIOLOGY

## 2022-01-24 PROCEDURE — 27201423 OPTIME MED/SURG SUP & DEVICES STERILE SUPPLY: Performed by: ORTHOPAEDIC SURGERY

## 2022-01-24 PROCEDURE — 27100168 OPTIME MED/SURG SUP & DEVICES NON-STERILE SUPPLY: Performed by: ORTHOPAEDIC SURGERY

## 2022-01-24 PROCEDURE — 27000716 HC OXISENSOR PROBE, ANY SIZE: Performed by: ANESTHESIOLOGY

## 2022-01-24 PROCEDURE — 37000008 HC ANESTHESIA 1ST 15 MINUTES: Performed by: ORTHOPAEDIC SURGERY

## 2022-01-24 PROCEDURE — 36000706: Performed by: ORTHOPAEDIC SURGERY

## 2022-01-24 PROCEDURE — 71000033 HC RECOVERY, INTIAL HOUR: Performed by: ORTHOPAEDIC SURGERY

## 2022-01-24 PROCEDURE — 27000177 HC AIRWAY, LARYNGEAL MASK: Performed by: ANESTHESIOLOGY

## 2022-01-24 PROCEDURE — 36000707: Performed by: ORTHOPAEDIC SURGERY

## 2022-01-24 PROCEDURE — 71000015 HC POSTOP RECOV 1ST HR: Performed by: ORTHOPAEDIC SURGERY

## 2022-01-24 PROCEDURE — 63600175 PHARM REV CODE 636 W HCPCS: Performed by: NURSE ANESTHETIST, CERTIFIED REGISTERED

## 2022-01-24 PROCEDURE — 87635 SARS-COV-2 COVID-19 AMP PRB: CPT | Performed by: ORTHOPAEDIC SURGERY

## 2022-01-24 RX ORDER — ONDANSETRON 2 MG/ML
4 INJECTION INTRAMUSCULAR; INTRAVENOUS DAILY PRN
Status: DISCONTINUED | OUTPATIENT
Start: 2022-01-24 | End: 2022-01-24 | Stop reason: HOSPADM

## 2022-01-24 RX ORDER — OXYCODONE HYDROCHLORIDE 5 MG/1
5 TABLET ORAL
Status: DISCONTINUED | OUTPATIENT
Start: 2022-01-24 | End: 2022-01-24 | Stop reason: HOSPADM

## 2022-01-24 RX ORDER — FENTANYL CITRATE 50 UG/ML
INJECTION, SOLUTION INTRAMUSCULAR; INTRAVENOUS
Status: DISCONTINUED | OUTPATIENT
Start: 2022-01-24 | End: 2022-01-24

## 2022-01-24 RX ORDER — DIPHENHYDRAMINE HYDROCHLORIDE 50 MG/ML
25 INJECTION INTRAMUSCULAR; INTRAVENOUS EVERY 6 HOURS PRN
Status: DISCONTINUED | OUTPATIENT
Start: 2022-01-24 | End: 2022-01-24 | Stop reason: HOSPADM

## 2022-01-24 RX ORDER — DEXAMETHASONE SODIUM PHOSPHATE 4 MG/ML
INJECTION, SOLUTION INTRA-ARTICULAR; INTRALESIONAL; INTRAMUSCULAR; INTRAVENOUS; SOFT TISSUE
Status: DISCONTINUED | OUTPATIENT
Start: 2022-01-24 | End: 2022-01-24

## 2022-01-24 RX ORDER — HYDROMORPHONE HYDROCHLORIDE 2 MG/ML
0.5 INJECTION, SOLUTION INTRAMUSCULAR; INTRAVENOUS; SUBCUTANEOUS EVERY 5 MIN PRN
Status: DISCONTINUED | OUTPATIENT
Start: 2022-01-24 | End: 2022-01-24 | Stop reason: HOSPADM

## 2022-01-24 RX ORDER — ONDANSETRON 4 MG/1
8 TABLET, ORALLY DISINTEGRATING ORAL EVERY 8 HOURS PRN
Status: DISCONTINUED | OUTPATIENT
Start: 2022-01-24 | End: 2022-01-24 | Stop reason: HOSPADM

## 2022-01-24 RX ORDER — CEFAZOLIN SODIUM 1 G/3ML
INJECTION, POWDER, FOR SOLUTION INTRAMUSCULAR; INTRAVENOUS
Status: DISCONTINUED | OUTPATIENT
Start: 2022-01-24 | End: 2022-01-24

## 2022-01-24 RX ORDER — SODIUM CHLORIDE, SODIUM LACTATE, POTASSIUM CHLORIDE, CALCIUM CHLORIDE 600; 310; 30; 20 MG/100ML; MG/100ML; MG/100ML; MG/100ML
INJECTION, SOLUTION INTRAVENOUS CONTINUOUS
Status: DISCONTINUED | OUTPATIENT
Start: 2022-01-24 | End: 2022-01-24 | Stop reason: HOSPADM

## 2022-01-24 RX ORDER — HYDROCODONE BITARTRATE AND ACETAMINOPHEN 5; 325 MG/1; MG/1
1 TABLET ORAL EVERY 4 HOURS PRN
Status: DISCONTINUED | OUTPATIENT
Start: 2022-01-24 | End: 2022-01-24 | Stop reason: HOSPADM

## 2022-01-24 RX ORDER — LIDOCAINE HYDROCHLORIDE 20 MG/ML
INJECTION, SOLUTION EPIDURAL; INFILTRATION; INTRACAUDAL; PERINEURAL
Status: DISCONTINUED | OUTPATIENT
Start: 2022-01-24 | End: 2022-01-24

## 2022-01-24 RX ORDER — ONDANSETRON 2 MG/ML
INJECTION INTRAMUSCULAR; INTRAVENOUS
Status: DISCONTINUED | OUTPATIENT
Start: 2022-01-24 | End: 2022-01-24

## 2022-01-24 RX ORDER — BUPIVACAINE HYDROCHLORIDE 5 MG/ML
INJECTION, SOLUTION EPIDURAL; INTRACAUDAL
Status: DISCONTINUED | OUTPATIENT
Start: 2022-01-24 | End: 2022-01-24 | Stop reason: HOSPADM

## 2022-01-24 RX ORDER — MORPHINE SULFATE 10 MG/ML
4 INJECTION INTRAMUSCULAR; INTRAVENOUS; SUBCUTANEOUS EVERY 5 MIN PRN
Status: DISCONTINUED | OUTPATIENT
Start: 2022-01-24 | End: 2022-01-24 | Stop reason: HOSPADM

## 2022-01-24 RX ORDER — SODIUM CHLORIDE, SODIUM LACTATE, POTASSIUM CHLORIDE, CALCIUM CHLORIDE 600; 310; 30; 20 MG/100ML; MG/100ML; MG/100ML; MG/100ML
125 INJECTION, SOLUTION INTRAVENOUS CONTINUOUS
Status: DISCONTINUED | OUTPATIENT
Start: 2022-01-24 | End: 2022-01-24 | Stop reason: HOSPADM

## 2022-01-24 RX ORDER — MEPERIDINE HYDROCHLORIDE 25 MG/ML
25 INJECTION INTRAMUSCULAR; INTRAVENOUS; SUBCUTANEOUS EVERY 10 MIN PRN
Status: DISCONTINUED | OUTPATIENT
Start: 2022-01-24 | End: 2022-01-24 | Stop reason: HOSPADM

## 2022-01-24 RX ORDER — EPHEDRINE SULFATE 50 MG/ML
INJECTION, SOLUTION INTRAVENOUS
Status: DISCONTINUED | OUTPATIENT
Start: 2022-01-24 | End: 2022-01-24

## 2022-01-24 RX ORDER — PROPOFOL 10 MG/ML
INJECTION, EMULSION INTRAVENOUS
Status: DISCONTINUED | OUTPATIENT
Start: 2022-01-24 | End: 2022-01-24

## 2022-01-24 RX ORDER — HYDROCODONE BITARTRATE AND ACETAMINOPHEN 5; 325 MG/1; MG/1
1 TABLET ORAL EVERY 4 HOURS PRN
Qty: 12 TABLET | Refills: 0 | Status: ON HOLD | OUTPATIENT
Start: 2022-01-24 | End: 2023-10-16 | Stop reason: HOSPADM

## 2022-01-24 RX ORDER — LIDOCAINE HYDROCHLORIDE 10 MG/ML
1 INJECTION, SOLUTION EPIDURAL; INFILTRATION; INTRACAUDAL; PERINEURAL ONCE
Status: DISCONTINUED | OUTPATIENT
Start: 2022-01-24 | End: 2022-01-24 | Stop reason: HOSPADM

## 2022-01-24 RX ADMIN — FENTANYL CITRATE 100 MCG: 50 INJECTION INTRAMUSCULAR; INTRAVENOUS at 08:01

## 2022-01-24 RX ADMIN — EPHEDRINE SULFATE 10 MG: 50 INJECTION INTRAVENOUS at 08:01

## 2022-01-24 RX ADMIN — LIDOCAINE HYDROCHLORIDE 50 MG: 20 INJECTION, SOLUTION EPIDURAL; INFILTRATION; INTRACAUDAL; PERINEURAL at 08:01

## 2022-01-24 RX ADMIN — PROPOFOL 100 MG: 10 INJECTION, EMULSION INTRAVENOUS at 08:01

## 2022-01-24 RX ADMIN — CEFAZOLIN 2 G: 1 INJECTION, POWDER, FOR SOLUTION INTRAMUSCULAR; INTRAVENOUS; PARENTERAL at 08:01

## 2022-01-24 RX ADMIN — EPHEDRINE SULFATE 15 MG: 50 INJECTION INTRAVENOUS at 08:01

## 2022-01-24 RX ADMIN — SODIUM CHLORIDE, POTASSIUM CHLORIDE, SODIUM LACTATE AND CALCIUM CHLORIDE: 600; 310; 30; 20 INJECTION, SOLUTION INTRAVENOUS at 08:01

## 2022-01-24 RX ADMIN — DEXAMETHASONE SODIUM PHOSPHATE 8 MG: 4 INJECTION, SOLUTION INTRA-ARTICULAR; INTRALESIONAL; INTRAMUSCULAR; INTRAVENOUS; SOFT TISSUE at 08:01

## 2022-01-24 RX ADMIN — ONDANSETRON 4 MG: 2 INJECTION INTRAMUSCULAR; INTRAVENOUS at 08:01

## 2022-01-24 NOTE — TRANSFER OF CARE
"Anesthesia Transfer of Care Note    Patient: Chrissie Cunningham    Procedure(s) Performed: Procedure(s) (LRB):  RELEASE, TRIGGER FINGER, THUMB (Right)    Patient location: PACU    Anesthesia Type: general    Transport from OR: Transported from OR on 6-10 L/min O2 by face mask with adequate spontaneous ventilation    Post pain: adequate analgesia    Post assessment: no apparent anesthetic complications    Post vital signs: stable    Level of consciousness: sedated    Nausea/Vomiting: no nausea/vomiting    Complications: none    Transfer of care protocol was followed      Last vitals:   Visit Vitals  BP (!) 141/67   Pulse 102   Temp 36.5 °C (97.7 °F)   Resp 11   Ht 5' 3" (1.6 m)   Wt 78.5 kg (173 lb)   SpO2 100%   Breastfeeding No   BMI 30.65 kg/m²     "

## 2022-01-24 NOTE — OP NOTE
DEPARTMENT OF ORTHOPEDIC SURGERY               OPERATIVE REPORT      Surgery Date: 1/24/2022     Pre-op Diagnosis:   Right trigger thumb    Post-op Diagnosis:  Same    Procedure:  Right trigger thumb release    IMPLANTS:  * No implants in log *    Surgeon: Jayant Mandujano III, MD     Assisting Surgeon:  None    Anesthesia:  LMA general    EBL:  5 cc    COMPLICATION:  none      INDICATION: Chrissie Cunningham is a 71 y.o. patient triggering right thumb with probable cyst desired release risks benefits discussed rehab course possibly recurrence      Procedure in detail:  Patient brought to the operating room and LMA general was Ms. Per Anesthesia right hand had well-padded tourniquet placed proximal right hand was prepped and draped normal sterile fashion transverse issues move the base of the A1 pulley cyst was ruptured while releasing A1 pulley tendon moved freely tolerated procedure well without any complications of this is part was sort of excised with the part of the tendon sheath wound was irrigated out injected with plain Marcaine tourniquet released hemostasis obtained and closed with nylon stitches tolerated procedure well without complication looking dressing                           Jayant Mandujano III

## 2022-01-24 NOTE — ANESTHESIA POSTPROCEDURE EVALUATION
Anesthesia Post Evaluation    Patient: Chrissie Cunningham    Procedure(s) Performed: Procedure(s) (LRB):  RELEASE, TRIGGER FINGER, THUMB (Right)    Final Anesthesia Type: general      Patient location during evaluation: PACU  Patient participation: Yes- Able to Participate  Level of consciousness: awake and sedated  Post-procedure vital signs: reviewed and stable  Pain management: adequate  Airway patency: patent    PONV status at discharge: No PONV  Anesthetic complications: no      Cardiovascular status: blood pressure returned to baseline  Respiratory status: unassisted  Hydration status: euvolemic  Follow-up not needed.          Vitals Value Taken Time   /63 01/24/22 0912   Temp 36.5 °C (97.7 °F) 01/24/22 0904   Pulse 89 01/24/22 0915   Resp 10 01/24/22 0915   SpO2 100 % 01/24/22 0915   Vitals shown include unvalidated device data.      No case tracking events are documented in the log.      Pain/Lauren Score: Lauren Score: 8 (1/24/2022  8:59 AM)

## 2022-01-24 NOTE — ANESTHESIA PREPROCEDURE EVALUATION
01/24/2022  Chrissie Cunningham is a 71 y.o., female.    Anesthesia Evaluation    I have reviewed the Patient Summary Reports.    I have reviewed the Nursing Notes. I have reviewed the NPO Status.   I have reviewed the Medications.     Review of Systems  Anesthesia Hx:  No problems with previous Anesthesia    Social:  Non-Smoker, No Alcohol Use    Hematology/Oncology:  Hematology Normal   Oncology Normal     EENT/Dental:EENT/Dental Normal   Cardiovascular:   Hypertension hyperlipidemia    Pulmonary:   Sleep Apnea    Renal/:  Renal/ Normal     Hepatic/GI:   GERD    Musculoskeletal:   Arthritis     Neurological:  Neurology Normal    Endocrine:  Endocrine Normal    Dermatological:  Skin Normal    Psych:  Psychiatric Normal           Physical Exam  General:  Obesity    Airway/Jaw/Neck:  Airway Findings: Mouth Opening: Normal Mallampati: II     Eyes/Ears/Nose:  Eyes/Ears/Nose Findings:     Chest/Lungs:  Chest/Lungs Findings: Clear to auscultation     Heart/Vascular:  Heart Findings: Rate: Normal  Rhythm: Regular Rhythm        Mental Status:  Mental Status Findings:  Cooperative, Alert and Oriented         Anesthesia Plan  Type of Anesthesia, risks & benefits discussed:  Anesthesia Type:  general    Patient's Preference:   Plan Factors:          Intra-op Monitoring Plan: standard ASA monitors  Intra-op Monitoring Plan Comments:   Post Op Pain Control Plan: per primary service following discharge from PACU and multimodal analgesia  Post Op Pain Control Plan Comments:     Induction:   IV  Beta Blocker:  Patient is not currently on a Beta-Blocker (No further documentation required).       Informed Consent: Patient understands risks and agrees with Anesthesia plan.  Questions answered. Anesthesia consent signed with patient.  ASA Score: 2     Day of Surgery Review of History & Physical: I have interviewed and examined the  patient. I have reviewed the patient's H&P dated:  There are no significant changes.          Ready For Surgery From Anesthesia Perspective.

## 2022-01-24 NOTE — BRIEF OP NOTE
[facility] - Orthopedic Periop Services  Brief Operative Note    Surgery Date: 1/24/2022     Surgeon(s) and Role:     * Jayant Mandujano III, MD - Primary    Assisting Surgeon: None  Surgery Date: 1/24/2022     Pre-op Diagnosis:   Right trigger thumb    Post-op Diagnosis:  Same    Procedure:  Right trigger thumb release    Anesthesia:  General LMA    Description of the findings of the procedure(s): See Op Note     Estimated Blood Loss:  5 cc         Specimens:   Specimen (24h ago, onward)            None            Discharge Note    OUTCOME: Patient tolerated treatment/procedure well without complication and is now ready for discharge.    DISPOSITION: Home or Self Care    FINAL DIAGNOSIS:  Trigger finger of right thumb    FOLLOWUP:  2 weeks    DISCHARGE INSTRUCTIONS:    Discharge Procedure Orders   Diet general     Call MD for:  temperature >100.4     Call MD for:  redness, tenderness, or signs of infection (pain, swelling, redness, odor or green/yellow discharge around incision site)     Remove dressing in 72 hours   Order Comments: Apply Band-Aid do not get wet         Jayant Mandujano III

## 2022-01-26 ENCOUNTER — OFFICE VISIT (OUTPATIENT)
Dept: FAMILY MEDICINE | Facility: CLINIC | Age: 72
End: 2022-01-26
Payer: MEDICARE

## 2022-01-26 VITALS
DIASTOLIC BLOOD PRESSURE: 81 MMHG | RESPIRATION RATE: 17 BRPM | HEIGHT: 63 IN | OXYGEN SATURATION: 99 % | WEIGHT: 175 LBS | BODY MASS INDEX: 31.01 KG/M2 | HEART RATE: 68 BPM | SYSTOLIC BLOOD PRESSURE: 162 MMHG | TEMPERATURE: 97 F

## 2022-01-26 DIAGNOSIS — T78.40XA ALLERGIC REACTION, INITIAL ENCOUNTER: ICD-10-CM

## 2022-01-26 DIAGNOSIS — I10 HYPERTENSION, UNSPECIFIED TYPE: Primary | ICD-10-CM

## 2022-01-26 DIAGNOSIS — K21.9 GASTROESOPHAGEAL REFLUX DISEASE, UNSPECIFIED WHETHER ESOPHAGITIS PRESENT: ICD-10-CM

## 2022-01-26 DIAGNOSIS — G25.81 RLS (RESTLESS LEGS SYNDROME): ICD-10-CM

## 2022-01-26 DIAGNOSIS — M06.9 RHEUMATOID ARTHRITIS, INVOLVING UNSPECIFIED SITE, UNSPECIFIED WHETHER RHEUMATOID FACTOR PRESENT: ICD-10-CM

## 2022-01-26 DIAGNOSIS — E78.5 DYSLIPIDEMIA: ICD-10-CM

## 2022-01-26 PROCEDURE — 1159F MED LIST DOCD IN RCRD: CPT | Mod: ,,, | Performed by: INTERNAL MEDICINE

## 2022-01-26 PROCEDURE — 1160F PR REVIEW ALL MEDS BY PRESCRIBER/CLIN PHARMACIST DOCUMENTED: ICD-10-PCS | Mod: ,,, | Performed by: INTERNAL MEDICINE

## 2022-01-26 PROCEDURE — 3079F PR MOST RECENT DIASTOLIC BLOOD PRESSURE 80-89 MM HG: ICD-10-PCS | Mod: ,,, | Performed by: INTERNAL MEDICINE

## 2022-01-26 PROCEDURE — 3008F PR BODY MASS INDEX (BMI) DOCUMENTED: ICD-10-PCS | Mod: ,,, | Performed by: INTERNAL MEDICINE

## 2022-01-26 PROCEDURE — 99214 OFFICE O/P EST MOD 30 MIN: CPT | Mod: ,,, | Performed by: INTERNAL MEDICINE

## 2022-01-26 PROCEDURE — 1101F PR PT FALLS ASSESS DOC 0-1 FALLS W/OUT INJ PAST YR: ICD-10-PCS | Mod: ,,, | Performed by: INTERNAL MEDICINE

## 2022-01-26 PROCEDURE — 1159F PR MEDICATION LIST DOCUMENTED IN MEDICAL RECORD: ICD-10-PCS | Mod: ,,, | Performed by: INTERNAL MEDICINE

## 2022-01-26 PROCEDURE — 1101F PT FALLS ASSESS-DOCD LE1/YR: CPT | Mod: ,,, | Performed by: INTERNAL MEDICINE

## 2022-01-26 PROCEDURE — 3077F PR MOST RECENT SYSTOLIC BLOOD PRESSURE >= 140 MM HG: ICD-10-PCS | Mod: ,,, | Performed by: INTERNAL MEDICINE

## 2022-01-26 PROCEDURE — 1160F RVW MEDS BY RX/DR IN RCRD: CPT | Mod: ,,, | Performed by: INTERNAL MEDICINE

## 2022-01-26 PROCEDURE — 1126F PR PAIN SEVERITY QUANTIFIED, NO PAIN PRESENT: ICD-10-PCS | Mod: ,,, | Performed by: INTERNAL MEDICINE

## 2022-01-26 PROCEDURE — 99214 PR OFFICE/OUTPT VISIT, EST, LEVL IV, 30-39 MIN: ICD-10-PCS | Mod: ,,, | Performed by: INTERNAL MEDICINE

## 2022-01-26 PROCEDURE — 3077F SYST BP >= 140 MM HG: CPT | Mod: ,,, | Performed by: INTERNAL MEDICINE

## 2022-01-26 PROCEDURE — 1126F AMNT PAIN NOTED NONE PRSNT: CPT | Mod: ,,, | Performed by: INTERNAL MEDICINE

## 2022-01-26 PROCEDURE — 3008F BODY MASS INDEX DOCD: CPT | Mod: ,,, | Performed by: INTERNAL MEDICINE

## 2022-01-26 PROCEDURE — 3079F DIAST BP 80-89 MM HG: CPT | Mod: ,,, | Performed by: INTERNAL MEDICINE

## 2022-01-26 PROCEDURE — 3288F PR FALLS RISK ASSESSMENT DOCUMENTED: ICD-10-PCS | Mod: ,,, | Performed by: INTERNAL MEDICINE

## 2022-01-26 PROCEDURE — 3288F FALL RISK ASSESSMENT DOCD: CPT | Mod: ,,, | Performed by: INTERNAL MEDICINE

## 2022-01-26 RX ORDER — HYDROXYZINE HYDROCHLORIDE 25 MG/1
25 TABLET, FILM COATED ORAL DAILY PRN
Qty: 15 TABLET | Refills: 0 | Status: SHIPPED | OUTPATIENT
Start: 2022-01-26

## 2022-01-26 RX ORDER — PRAMIPEXOLE DIHYDROCHLORIDE 0.25 MG/1
1 TABLET ORAL DAILY
COMMUNITY

## 2022-01-26 NOTE — PATIENT INSTRUCTIONS
Chrissie was seen today for allergic reaction.    Diagnoses and all orders for this visit:    Hypertension, unspecified type    Gastroesophageal reflux disease, unspecified whether esophagitis present    Dyslipidemia    Rheumatoid arthritis, involving unspecified site, unspecified whether rheumatoid factor present    RLS (restless legs syndrome)    Allergic reaction, initial encounter  -     Ambulatory referral/consult to ENT; Future    Other orders  The following orders have not been finalized:  -     hydrOXYzine HCL (ATARAX) 25 MG tablet

## 2022-01-26 NOTE — PROGRESS NOTES
Subjective:       Patient ID: Chrissie Cunningham is a 71 y.o. female.    Chief Complaint: Allergic Reaction    Patient is here today for check up evaluation. Patient states she recently woke up with facial and mouth and tongue swelling. States she thought maybe it was from a broken tooth and went to follow with Dentist. States she was evaluated and had xray done and was told swelling was nothing to do with her teeth, gums, or dentures. Was advised to follow with her PCP. Patient states she ate some peanuts the night before and could possibly be from that. Patient states she did have a slight reaction to eating peanuts one other time. Will refer to ENT for allergy testing. Will also order Atarax 25 mg PO QD prn and follow in 1 month.       Current Medications:    Current Outpatient Medications:     acyclovir (ZOVIRAX) 400 MG tablet, Take 1 tablet by mouth once daily, Disp: 90 tablet, Rfl: 0    alendronate (FOSAMAX) 70 MG tablet, Take 1 tablet (70 mg total) by mouth every 7 days., Disp: 12 tablet, Rfl: 3    atorvastatin (LIPITOR) 20 MG tablet, , Disp: , Rfl:     diclofenac (CATAFLAM) 50 MG tablet, TAKE 1 TABLET BY MOUTH 4 TIMES DAILY, Disp: , Rfl:     DICLOFENAC POTASSIUM ORAL, Take 50 mg by mouth 4 (four) times daily., Disp: , Rfl:     EScitalopram oxalate (LEXAPRO) 10 MG tablet, Take 1 tablet (10 mg total) by mouth once daily., Disp: 90 tablet, Rfl: 3    HYDROcodone-acetaminophen (NORCO) 5-325 mg per tablet, Take 1 tablet by mouth every 4 (four) hours as needed for Pain., Disp: 12 tablet, Rfl: 0    NIFEdipine (ADALAT CC) 60 MG TbSR, Take 1 tablet (60 mg total) by mouth once daily., Disp: 90 tablet, Rfl: 1    omeprazole (PRILOSEC) 40 MG capsule, Take 1 capsule (40 mg total) by mouth once daily., Disp: 90 capsule, Rfl: 1    pramipexole (MIRAPEX) 0.25 MG tablet, Take 1 tablet by mouth once daily., Disp: , Rfl:     pregabalin (LYRICA) 75 MG capsule, Take one capsule three times a day, Disp: 90 capsule, Rfl:  3    Last Labs:     Admission on 01/24/2022, Discharged on 01/24/2022   Component Date Value    SARS COV-2 MOLECULAR 01/24/2022 Negative    Lab Visit on 01/20/2022   Component Date Value    Sodium 01/20/2022 138     Potassium 01/20/2022 3.9     Chloride 01/20/2022 103     CO2 01/20/2022 31     Anion Gap 01/20/2022 8     Glucose 01/20/2022 90     BUN 01/20/2022 12     Creatinine 01/20/2022 0.93     BUN/Creatinine Ratio 01/20/2022 13     Calcium 01/20/2022 9.1     Total Protein 01/20/2022 7.5     Albumin 01/20/2022 3.9     Globulin 01/20/2022 3.6     A/G Ratio 01/20/2022 1.1     Bilirubin, Total 01/20/2022 0.6     Alk Phos 01/20/2022 86     ALT 01/20/2022 34     AST 01/20/2022 24     eGFR 01/20/2022 63     WBC 01/20/2022 4.41*    RBC 01/20/2022 4.51     Hemoglobin 01/20/2022 13.3     Hematocrit 01/20/2022 42.2     MCV 01/20/2022 93.6     MCH 01/20/2022 29.5     MCHC 01/20/2022 31.5*    RDW 01/20/2022 13.2     Platelet Count 01/20/2022 241     MPV 01/20/2022 13.0*    Neutrophils % 01/20/2022 60.5     Lymphocytes % 01/20/2022 25.9*    Monocytes % 01/20/2022 8.8*    Eosinophils % 01/20/2022 3.2     Basophils % 01/20/2022 1.1*    Immature Granulocytes % 01/20/2022 0.5*    nRBC, Auto 01/20/2022 0.0     Neutrophils, Abs 01/20/2022 2.67     Lymphocytes, Absolute 01/20/2022 1.14     Monocytes, Absolute 01/20/2022 0.39     Eosinophils, Absolute 01/20/2022 0.14     Basophils, Absolute 01/20/2022 0.05     Immature Granulocytes, A* 01/20/2022 0.02     nRBC, Absolute 01/20/2022 0.00     Diff Type 01/20/2022 Auto        Last Imaging:  X-Ray Chest PA And Lateral  Narrative: EXAMINATION:  XR CHEST PA AND LATERAL    CLINICAL HISTORY:  Encounter for preprocedural respiratory examination    COMPARISON:  7 August 2019    FINDINGS:  The heart and mediastinum are normal in size and configuration.  The pulmonary vascularity is normal in caliber.  No lung infiltrates, effusions, pneumothorax or  other abnormality is demonstrated.  Impression: No evidence of cardiopulmonary disease.    Electronically signed by: Julito Page  Date:    01/20/2022  Time:    09:39         Review of Systems   HENT: Positive for dental problem and facial swelling.    All other systems reviewed and are negative.        Objective:      Physical Exam  Vitals reviewed.         Assessment:       1. Hypertension, unspecified type     2. Gastroesophageal reflux disease, unspecified whether esophagitis present     3. Dyslipidemia     4. Rheumatoid arthritis, involving unspecified site, unspecified whether rheumatoid factor present     5. RLS (restless legs syndrome)     6. Allergic reaction, initial encounter  Ambulatory referral/consult to ENT        Plan:         Chrissie was seen today for allergic reaction.    Diagnoses and all orders for this visit:    Hypertension, unspecified type    Gastroesophageal reflux disease, unspecified whether esophagitis present    Dyslipidemia    Rheumatoid arthritis, involving unspecified site, unspecified whether rheumatoid factor present    RLS (restless legs syndrome)    Allergic reaction, initial encounter  -     Ambulatory referral/consult to ENT; Future    Other orders  The following orders have not been finalized:  -     hydrOXYzine HCL (ATARAX) 25 MG tablet

## 2022-02-03 ENCOUNTER — OFFICE VISIT (OUTPATIENT)
Dept: OTOLARYNGOLOGY | Facility: CLINIC | Age: 72
End: 2022-02-03
Payer: MEDICARE

## 2022-02-03 VITALS — BODY MASS INDEX: 31.01 KG/M2 | HEIGHT: 63 IN | WEIGHT: 175 LBS

## 2022-02-03 DIAGNOSIS — T78.40XA ALLERGIC REACTION, INITIAL ENCOUNTER: ICD-10-CM

## 2022-02-03 DIAGNOSIS — J30.9 CHRONIC ALLERGIC RHINITIS: Primary | ICD-10-CM

## 2022-02-03 PROCEDURE — 99204 OFFICE O/P NEW MOD 45 MIN: CPT | Mod: S$PBB,,, | Performed by: OTOLARYNGOLOGY

## 2022-02-03 PROCEDURE — 3008F PR BODY MASS INDEX (BMI) DOCUMENTED: ICD-10-PCS | Mod: CPTII,,, | Performed by: OTOLARYNGOLOGY

## 2022-02-03 PROCEDURE — 3008F BODY MASS INDEX DOCD: CPT | Mod: CPTII,,, | Performed by: OTOLARYNGOLOGY

## 2022-02-03 PROCEDURE — 1160F RVW MEDS BY RX/DR IN RCRD: CPT | Mod: CPTII,,, | Performed by: OTOLARYNGOLOGY

## 2022-02-03 PROCEDURE — 1159F MED LIST DOCD IN RCRD: CPT | Mod: CPTII,,, | Performed by: OTOLARYNGOLOGY

## 2022-02-03 PROCEDURE — 99214 OFFICE O/P EST MOD 30 MIN: CPT | Mod: PBBFAC | Performed by: OTOLARYNGOLOGY

## 2022-02-03 PROCEDURE — 1159F PR MEDICATION LIST DOCUMENTED IN MEDICAL RECORD: ICD-10-PCS | Mod: CPTII,,, | Performed by: OTOLARYNGOLOGY

## 2022-02-03 PROCEDURE — 1160F PR REVIEW ALL MEDS BY PRESCRIBER/CLIN PHARMACIST DOCUMENTED: ICD-10-PCS | Mod: CPTII,,, | Performed by: OTOLARYNGOLOGY

## 2022-02-03 PROCEDURE — 99204 PR OFFICE/OUTPT VISIT, NEW, LEVL IV, 45-59 MIN: ICD-10-PCS | Mod: S$PBB,,, | Performed by: OTOLARYNGOLOGY

## 2022-02-03 NOTE — PROGRESS NOTES
Subjective:       Patient ID: Chrissie Cunningham is a 71 y.o. female.    Chief Complaint: Allergies (Patient is here for allergy testing.)  itching all over and in mouth  HPI  Review of Systems   HENT: Positive for congestion, rhinorrhea, sinus pressure and sinus pain.    All other systems reviewed and are negative.      Objective:      Physical Exam  General: NAD  Head: Normocephalic, atraumatic, no facial asymmetry/normal strength,  Ears: Both auricules normal in appearance, w/o deformities tympanic membranes normal external auditory canals normal  Nose: External nose w/o deformities normal turbinates no drainage or inflammation  Oral Cavity: Lips, gums, floor of mouth, tongue hard palate, and buccal mucosa without mass/lesion  Oropharynx: Mucosa pink and moist, soft palate, posterior pharynx and oropharyngeal wall without mass/lesion  Neck: Supple, symmetric, trachea midline, no palpable mass/lesion, no palpable cervical lymphadenopathy  Skin: Warm and dry, no concerning lesions  Respiratory: Respirations even, unlabored    Assessment:       1. Chronic allergic rhinitis    2. Allergic reaction, initial encounter        Plan:       RAST for foods and environmental

## 2022-03-20 NOTE — PROGRESS NOTES
Subjective:       Patient ID: Chrissie Cunningham is a 71 y.o. female.    Chief Complaint:  No chief complaint on file.      History of Present Illness  This pleasant 71 year old right handed  female presents to clinic for follow up of neuropathy, bilateral CTS and RLS. Patient states she is doing well since her last visit. She is pleased today with the treatment of the neuropathy, CTS, and RLS. She desires to continue the current management of all three problems. She reported January 2022 what she thought were side effects of her Lyrica that was effecting her mouth with a rash and pain. After being off the medication for about a month and allergy testing the patient and her PCP determined that it was not the Lyrica causing these problems. She then restarted her Lyrica 75 mg one three times a day. She states today she is tolerating this medication without side effects and desires to continue the Lyrica.  Patient states neuropathy started 2 years ago with burning, numbness, and tingling that she rates today as a 2 on a scale of 0-5. Prior to the Lyrica she rated these symptoms as a 5 on a scale of 0 to 5. Symptoms are located in the bilateral legs, feet, and bilateral hands that is worse in the legs and feet. Symptoms are intermittent with pain rated as a 3 on a scale of 0-10 and described as a stinging pain that is worse during the daytime. She rated the pain at the last visit as a 10 on a scale of 0 to 10. Precipitating factors are prolonged activity and walking. She denies diabetes, back injury or neck injury. She denies back pain but has chronic neck pain from neck surgery in 2013. She has been evaluated by vascular surgeon Dr. EVER Hernandez in the past.  Arterial doppler with exercise done on September 22, 2020 showed no significant disease when compared to the resting study done in August 2020. Venous doppler done on August 5, 2020 was negative for DVT. Recent EMG NCS supports CTS but was negative in the  legs. She was referred to orthopedics at the last visit to evaluate and treat the CTS. She reports having trigger finger surgery and the CTS is greatly improved. She also reports symptoms of RLS with cramps with the urge to move her legs that is relieved with movement but then returns. She was tapered off the sinemet at the last visit due to side effects of feeling jittery the next day. She reports the Lyrica and support stockings are helping with the RLS. She denies smoking or drinking alcohol and had a total hysterectomy in 2012. Discussed EMG NCS results and lab results from last visit. Discussed the plan in detail with the patient and she is in agreement with the plan. All her questions were answered at today's visit.  was checked for the Lyrica.       EMG NCS of all 4 extremities done on July 12, 2021 at the [facility] by Dr. KEIRY Olguin showed predominantly sensory peripheral neuropathy. Bilateral median nerve entrapment neuropathy at the carpal tunnel, left greater than right. Normal EMG of the bilateral lower extremities with no electrophysiological evidence of denervation or neuropathic changes.       Review of Systems  Review of Systems   Constitutional: Negative for activity change, diaphoresis, fever and unexpected weight change.   HENT: Negative for congestion, ear pain, facial swelling, hearing loss, tinnitus, trouble swallowing and voice change.    Eyes: Negative for photophobia, pain and visual disturbance.   Respiratory: Negative for chest tightness, shortness of breath and wheezing.    Cardiovascular: Negative for chest pain, palpitations and leg swelling.   Gastrointestinal: Negative for constipation, diarrhea, nausea and vomiting.   Genitourinary: Negative for difficulty urinating.   Musculoskeletal: Negative for back pain, gait problem, neck pain and neck stiffness.   Skin: Negative for color change, pallor, rash and wound.   Neurological: Positive for numbness.  Negative for dizziness, tremors, seizures, syncope, facial asymmetry, speech difficulty, weakness, light-headedness and headaches.        RLS        CTS   Psychiatric/Behavioral: Negative for agitation, behavioral problems, confusion, decreased concentration and hallucinations. The patient is not nervous/anxious and is not hyperactive.        Objective:      Neurologic Exam     Mental Status   Oriented to person, place, and time.   Oriented to person.   Oriented to place.   Oriented to time.   Attention: normal. Concentration: normal.   Speech: speech is normal   Level of consciousness: alert  Knowledge: good.     Cranial Nerves     CN II   Visual fields full to confrontation.     CN III, IV, VI   Pupils are equal, round, and reactive to light.  Extraocular motions are normal.   Right pupil: Size: 3 mm. Shape: regular. Reactivity: brisk.   Left pupil: Size: 3 mm. Shape: regular. Reactivity: brisk.   CN III: no CN III palsy  CN VI: no CN VI palsy  Nystagmus: none     CN V   Facial sensation intact.     CN VII   Facial expression full, symmetric.     CN VIII   CN VIII normal.   Hearing: intact    CN IX, X   CN IX normal.     CN XI   CN XI normal.     CN XII   CN XII normal.     Motor Exam   Muscle bulk: normal  Overall muscle tone: normal    Strength   Right deltoid: 5/5  Left deltoid: 5/5  Right biceps: 5/5  Left biceps: 5/5  Right triceps: 5/5  Left triceps: 5/5  Right quadriceps: 5/5  Left quadriceps: 5/5  Right hamstrin/5  Left hamstrin/5    Sensory Exam   Light touch normal.     Gait, Coordination, and Reflexes     Gait  Gait: normal    Coordination   Romberg: negative  Finger to nose coordination: normal  Heel to shin coordination: normal  Tandem walking coordination: normal    Tremor   Resting tremor: absent  Intention tremor: absent  Action tremor: absent    Reflexes   Right brachioradialis: 2+  Left brachioradialis: 2+  Right biceps: 2+  Left biceps: 2+  Right triceps: 2+  Left triceps: 2+  Right  patellar: 2+  Left patellar: 2+  Right achilles: 2+  Left achilles: 2+  Right : 4+  Left : 4+      Physical Exam  Constitutional:       General: She is not in acute distress.  HENT:      Head: Normocephalic.      Nose: Nose normal.      Mouth/Throat:      Mouth: Mucous membranes are moist.   Eyes:      Extraocular Movements: Extraocular movements intact and EOM normal.      Pupils: Pupils are equal, round, and reactive to light.   Cardiovascular:      Rate and Rhythm: Normal rate and regular rhythm.      Heart sounds: Normal heart sounds. No murmur heard.  Pulmonary:      Effort: Pulmonary effort is normal. No respiratory distress.      Breath sounds: Normal breath sounds. No wheezing, rhonchi or rales.   Musculoskeletal:         General: No swelling, tenderness, deformity or signs of injury. Normal range of motion.      Cervical back: Normal range of motion. No rigidity or tenderness.      Right lower leg: No edema.      Left lower leg: No edema.   Skin:     General: Skin is warm and dry.      Capillary Refill: Capillary refill takes less than 2 seconds.      Coloration: Skin is not jaundiced or pale.      Findings: No bruising, erythema, lesion or rash.   Neurological:      Mental Status: She is alert and oriented to person, place, and time.      Coordination: Finger-Nose-Finger Test, Heel to Shin Test and Romberg Test normal.      Gait: Gait is intact. Tandem walk normal.      Deep Tendon Reflexes:      Reflex Scores:       Tricep reflexes are 2+ on the right side and 2+ on the left side.       Bicep reflexes are 2+ on the right side and 2+ on the left side.       Brachioradialis reflexes are 2+ on the right side and 2+ on the left side.       Patellar reflexes are 2+ on the right side and 2+ on the left side.       Achilles reflexes are 2+ on the right side and 2+ on the left side.  Psychiatric:         Mood and Affect: Mood normal.         Speech: Speech normal.         Behavior: Behavior normal.            Assessment:     Problem List Items Addressed This Visit        Neuro    Polyneuropathy - Primary (Chronic)    Relevant Medications    pregabalin (LYRICA) 75 MG capsule    RLS (restless legs syndrome) (Chronic)    Relevant Medications    pregabalin (LYRICA) 75 MG capsule    Bilateral carpal tunnel syndrome (Chronic)            Plan:       1. Keep follow up with orthopedics s/p surgery  2.   Renew and continue Lyrica 75 mg to one capsule three times a day  3.   Regular follow up with PCP for medical management and labs  4.   Follow up with neurology in 3 months or sooner if needed

## 2022-03-24 ENCOUNTER — OFFICE VISIT (OUTPATIENT)
Dept: NEUROLOGY | Facility: CLINIC | Age: 72
End: 2022-03-24
Payer: MEDICARE

## 2022-03-24 VITALS
HEART RATE: 87 BPM | SYSTOLIC BLOOD PRESSURE: 128 MMHG | DIASTOLIC BLOOD PRESSURE: 86 MMHG | WEIGHT: 177.63 LBS | BODY MASS INDEX: 31.47 KG/M2 | OXYGEN SATURATION: 97 % | HEIGHT: 63 IN

## 2022-03-24 DIAGNOSIS — G62.9 POLYNEUROPATHY: Primary | Chronic | ICD-10-CM

## 2022-03-24 DIAGNOSIS — G25.81 RLS (RESTLESS LEGS SYNDROME): Chronic | ICD-10-CM

## 2022-03-24 DIAGNOSIS — G56.03 BILATERAL CARPAL TUNNEL SYNDROME: Chronic | ICD-10-CM

## 2022-03-24 PROBLEM — G47.39 TREATMENT-EMERGENT CENTRAL SLEEP APNEA: Status: ACTIVE | Noted: 2022-03-24

## 2022-03-24 PROCEDURE — 3288F PR FALLS RISK ASSESSMENT DOCUMENTED: ICD-10-PCS | Mod: CPTII,,, | Performed by: NURSE PRACTITIONER

## 2022-03-24 PROCEDURE — 3079F PR MOST RECENT DIASTOLIC BLOOD PRESSURE 80-89 MM HG: ICD-10-PCS | Mod: CPTII,,, | Performed by: NURSE PRACTITIONER

## 2022-03-24 PROCEDURE — 1101F PR PT FALLS ASSESS DOC 0-1 FALLS W/OUT INJ PAST YR: ICD-10-PCS | Mod: CPTII,,, | Performed by: NURSE PRACTITIONER

## 2022-03-24 PROCEDURE — 1160F RVW MEDS BY RX/DR IN RCRD: CPT | Mod: CPTII,,, | Performed by: NURSE PRACTITIONER

## 2022-03-24 PROCEDURE — 3008F BODY MASS INDEX DOCD: CPT | Mod: CPTII,,, | Performed by: NURSE PRACTITIONER

## 2022-03-24 PROCEDURE — 1159F PR MEDICATION LIST DOCUMENTED IN MEDICAL RECORD: ICD-10-PCS | Mod: CPTII,,, | Performed by: NURSE PRACTITIONER

## 2022-03-24 PROCEDURE — 1159F MED LIST DOCD IN RCRD: CPT | Mod: CPTII,,, | Performed by: NURSE PRACTITIONER

## 2022-03-24 PROCEDURE — 3079F DIAST BP 80-89 MM HG: CPT | Mod: CPTII,,, | Performed by: NURSE PRACTITIONER

## 2022-03-24 PROCEDURE — 3074F SYST BP LT 130 MM HG: CPT | Mod: CPTII,,, | Performed by: NURSE PRACTITIONER

## 2022-03-24 PROCEDURE — 3074F PR MOST RECENT SYSTOLIC BLOOD PRESSURE < 130 MM HG: ICD-10-PCS | Mod: CPTII,,, | Performed by: NURSE PRACTITIONER

## 2022-03-24 PROCEDURE — 99214 PR OFFICE/OUTPT VISIT, EST, LEVL IV, 30-39 MIN: ICD-10-PCS | Mod: S$PBB,,, | Performed by: NURSE PRACTITIONER

## 2022-03-24 PROCEDURE — 1160F PR REVIEW ALL MEDS BY PRESCRIBER/CLIN PHARMACIST DOCUMENTED: ICD-10-PCS | Mod: CPTII,,, | Performed by: NURSE PRACTITIONER

## 2022-03-24 PROCEDURE — 1101F PT FALLS ASSESS-DOCD LE1/YR: CPT | Mod: CPTII,,, | Performed by: NURSE PRACTITIONER

## 2022-03-24 PROCEDURE — 3288F FALL RISK ASSESSMENT DOCD: CPT | Mod: CPTII,,, | Performed by: NURSE PRACTITIONER

## 2022-03-24 PROCEDURE — 99214 OFFICE O/P EST MOD 30 MIN: CPT | Mod: PBBFAC | Performed by: NURSE PRACTITIONER

## 2022-03-24 PROCEDURE — 3008F PR BODY MASS INDEX (BMI) DOCUMENTED: ICD-10-PCS | Mod: CPTII,,, | Performed by: NURSE PRACTITIONER

## 2022-03-24 PROCEDURE — 99214 OFFICE O/P EST MOD 30 MIN: CPT | Mod: S$PBB,,, | Performed by: NURSE PRACTITIONER

## 2022-03-24 RX ORDER — PREGABALIN 75 MG/1
CAPSULE ORAL
Qty: 90 CAPSULE | Refills: 3 | Status: SHIPPED | OUTPATIENT
Start: 2022-03-24 | End: 2022-06-29 | Stop reason: ALTCHOICE

## 2022-03-24 NOTE — PATIENT INSTRUCTIONS
Keep follow up with orthopedics s/p surgery  2.   Renew and continue Lyrica 75 mg to one capsule three times a day  3.   Regular follow up with PCP for medical management and labs  4.   Follow up with neurology in 3 months or sooner if needed  
decreased weight-shifting ability

## 2022-05-02 ENCOUNTER — TELEPHONE (OUTPATIENT)
Dept: OBSTETRICS AND GYNECOLOGY | Facility: CLINIC | Age: 72
End: 2022-05-02
Payer: MEDICARE

## 2022-05-02 NOTE — TELEPHONE ENCOUNTER
Called [facility] pharmacy Pharmacist stated medication was filled and  on April 28, 2022. Called to inform patient no answer.

## 2022-06-01 ENCOUNTER — OFFICE VISIT (OUTPATIENT)
Dept: OBSTETRICS AND GYNECOLOGY | Facility: CLINIC | Age: 72
End: 2022-06-01
Payer: MEDICARE

## 2022-06-01 VITALS
HEIGHT: 63 IN | TEMPERATURE: 98 F | DIASTOLIC BLOOD PRESSURE: 77 MMHG | RESPIRATION RATE: 16 BRPM | HEART RATE: 66 BPM | WEIGHT: 176.19 LBS | BODY MASS INDEX: 31.22 KG/M2 | SYSTOLIC BLOOD PRESSURE: 145 MMHG

## 2022-06-01 DIAGNOSIS — R10.2 PELVIC PAIN IN FEMALE: ICD-10-CM

## 2022-06-01 DIAGNOSIS — R10.2 PELVIC PRESSURE IN FEMALE: Primary | ICD-10-CM

## 2022-06-01 DIAGNOSIS — M54.10 BILATERAL RADIATING LEG PAIN: ICD-10-CM

## 2022-06-01 DIAGNOSIS — R10.30 LOWER ABDOMINAL PAIN: ICD-10-CM

## 2022-06-01 DIAGNOSIS — Z12.31 BREAST CANCER SCREENING BY MAMMOGRAM: Primary | ICD-10-CM

## 2022-06-01 DIAGNOSIS — R35.0 URINARY FREQUENCY: ICD-10-CM

## 2022-06-01 DIAGNOSIS — F41.1 GENERALIZED ANXIETY DISORDER: ICD-10-CM

## 2022-06-01 DIAGNOSIS — Z01.419 ENCOUNTER FOR ANNUAL ROUTINE GYNECOLOGICAL EXAMINATION: ICD-10-CM

## 2022-06-01 DIAGNOSIS — N95.2 VAGINITIS, ATROPHIC: ICD-10-CM

## 2022-06-01 DIAGNOSIS — I10 HYPERTENSION, UNSPECIFIED TYPE: ICD-10-CM

## 2022-06-01 DIAGNOSIS — M85.80 OSTEOPENIA, UNSPECIFIED LOCATION: ICD-10-CM

## 2022-06-01 LAB
ALBUMIN SERPL BCP-MCNC: 4.1 G/DL (ref 3.5–5)
ALBUMIN/GLOB SERPL: 1.1 {RATIO}
ALP SERPL-CCNC: 61 U/L (ref 55–142)
ALT SERPL W P-5'-P-CCNC: 13 U/L (ref 13–56)
ANION GAP SERPL CALCULATED.3IONS-SCNC: 11 MMOL/L (ref 7–16)
AST SERPL W P-5'-P-CCNC: 20 U/L (ref 15–37)
BASOPHILS # BLD AUTO: 0.03 K/UL (ref 0–0.2)
BASOPHILS NFR BLD AUTO: 0.7 % (ref 0–1)
BILIRUB SERPL-MCNC: 0.6 MG/DL (ref 0–1.2)
BILIRUB UR QL STRIP: NEGATIVE
BUN SERPL-MCNC: 11 MG/DL (ref 7–18)
BUN/CREAT SERPL: 14 (ref 6–20)
CALCIUM SERPL-MCNC: 8.8 MG/DL (ref 8.5–10.1)
CHLORIDE SERPL-SCNC: 102 MMOL/L (ref 98–107)
CLARITY UR: CLEAR
CO2 SERPL-SCNC: 29 MMOL/L (ref 21–32)
COLOR UR: YELLOW
CREAT SERPL-MCNC: 0.79 MG/DL (ref 0.55–1.02)
DIFFERENTIAL METHOD BLD: ABNORMAL
EOSINOPHIL # BLD AUTO: 0.13 K/UL (ref 0–0.5)
EOSINOPHIL NFR BLD AUTO: 3 % (ref 1–4)
ERYTHROCYTE [DISTWIDTH] IN BLOOD BY AUTOMATED COUNT: 13 % (ref 11.5–14.5)
GLOBULIN SER-MCNC: 3.6 G/DL (ref 2–4)
GLUCOSE SERPL-MCNC: 78 MG/DL (ref 74–106)
GLUCOSE UR STRIP-MCNC: NEGATIVE MG/DL
HCT VFR BLD AUTO: 40.8 % (ref 38–47)
HGB BLD-MCNC: 13.7 G/DL (ref 12–16)
IMM GRANULOCYTES # BLD AUTO: 0.01 K/UL (ref 0–0.04)
IMM GRANULOCYTES NFR BLD: 0.2 % (ref 0–0.4)
KETONES UR STRIP-SCNC: NEGATIVE MG/DL
LEUKOCYTE ESTERASE UR QL STRIP: NEGATIVE
LYMPHOCYTES # BLD AUTO: 1.7 K/UL (ref 1–4.8)
LYMPHOCYTES NFR BLD AUTO: 39.1 % (ref 27–41)
MCH RBC QN AUTO: 30.4 PG (ref 27–31)
MCHC RBC AUTO-ENTMCNC: 33.6 G/DL (ref 32–36)
MCV RBC AUTO: 90.5 FL (ref 80–96)
MONOCYTES # BLD AUTO: 0.4 K/UL (ref 0–0.8)
MONOCYTES NFR BLD AUTO: 9.2 % (ref 2–6)
MPC BLD CALC-MCNC: 13 FL (ref 9.4–12.4)
NEUTROPHILS # BLD AUTO: 2.08 K/UL (ref 1.8–7.7)
NEUTROPHILS NFR BLD AUTO: 47.8 % (ref 53–65)
NITRITE UR QL STRIP: NEGATIVE
NRBC # BLD AUTO: 0 X10E3/UL
NRBC, AUTO (.00): 0 %
PH UR STRIP: 7 PH UNITS
PLATELET # BLD AUTO: 228 K/UL (ref 150–400)
POTASSIUM SERPL-SCNC: 3.9 MMOL/L (ref 3.5–5.1)
PROT SERPL-MCNC: 7.7 G/DL (ref 6.4–8.2)
PROT UR QL STRIP: NEGATIVE
RBC # BLD AUTO: 4.51 M/UL (ref 4.2–5.4)
RBC # UR STRIP: NEGATIVE /UL
SODIUM SERPL-SCNC: 138 MMOL/L (ref 136–145)
SP GR UR STRIP: <=1.005
UROBILINOGEN UR STRIP-ACNC: 0.2 MG/DL
WBC # BLD AUTO: 4.35 K/UL (ref 4.5–11)

## 2022-06-01 PROCEDURE — 80053 COMPREHEN METABOLIC PANEL: CPT | Mod: ,,, | Performed by: CLINICAL MEDICAL LABORATORY

## 2022-06-01 PROCEDURE — 3077F SYST BP >= 140 MM HG: CPT | Mod: CPTII,,, | Performed by: OBSTETRICS & GYNECOLOGY

## 2022-06-01 PROCEDURE — 3078F DIAST BP <80 MM HG: CPT | Mod: CPTII,,, | Performed by: OBSTETRICS & GYNECOLOGY

## 2022-06-01 PROCEDURE — 1160F PR REVIEW ALL MEDS BY PRESCRIBER/CLIN PHARMACIST DOCUMENTED: ICD-10-PCS | Mod: CPTII,,, | Performed by: OBSTETRICS & GYNECOLOGY

## 2022-06-01 PROCEDURE — 1159F MED LIST DOCD IN RCRD: CPT | Mod: CPTII,,, | Performed by: OBSTETRICS & GYNECOLOGY

## 2022-06-01 PROCEDURE — G0101 PR CA SCREEN;PELVIC/BREAST EXAM: ICD-10-PCS | Mod: ,,, | Performed by: OBSTETRICS & GYNECOLOGY

## 2022-06-01 PROCEDURE — G0101 CA SCREEN;PELVIC/BREAST EXAM: HCPCS | Mod: ,,, | Performed by: OBSTETRICS & GYNECOLOGY

## 2022-06-01 PROCEDURE — 3008F BODY MASS INDEX DOCD: CPT | Mod: CPTII,,, | Performed by: OBSTETRICS & GYNECOLOGY

## 2022-06-01 PROCEDURE — 3077F PR MOST RECENT SYSTOLIC BLOOD PRESSURE >= 140 MM HG: ICD-10-PCS | Mod: CPTII,,, | Performed by: OBSTETRICS & GYNECOLOGY

## 2022-06-01 PROCEDURE — 36415 COLL VENOUS BLD VENIPUNCTURE: CPT | Mod: ,,, | Performed by: OBSTETRICS & GYNECOLOGY

## 2022-06-01 PROCEDURE — 81003 URINALYSIS, REFLEX TO URINE CULTURE: ICD-10-PCS | Mod: QW,,, | Performed by: CLINICAL MEDICAL LABORATORY

## 2022-06-01 PROCEDURE — 3078F PR MOST RECENT DIASTOLIC BLOOD PRESSURE < 80 MM HG: ICD-10-PCS | Mod: CPTII,,, | Performed by: OBSTETRICS & GYNECOLOGY

## 2022-06-01 PROCEDURE — 1160F RVW MEDS BY RX/DR IN RCRD: CPT | Mod: CPTII,,, | Performed by: OBSTETRICS & GYNECOLOGY

## 2022-06-01 PROCEDURE — 80053 COMPREHENSIVE METABOLIC PANEL: ICD-10-PCS | Mod: ,,, | Performed by: CLINICAL MEDICAL LABORATORY

## 2022-06-01 PROCEDURE — 85025 CBC WITH DIFFERENTIAL: ICD-10-PCS | Mod: ,,, | Performed by: CLINICAL MEDICAL LABORATORY

## 2022-06-01 PROCEDURE — 82270 OCCULT BLOOD FECES: CPT | Mod: ,,, | Performed by: OBSTETRICS & GYNECOLOGY

## 2022-06-01 PROCEDURE — 36415 PR COLLECTION VENOUS BLOOD,VENIPUNCTURE: ICD-10-PCS | Mod: ,,, | Performed by: OBSTETRICS & GYNECOLOGY

## 2022-06-01 PROCEDURE — 1159F PR MEDICATION LIST DOCUMENTED IN MEDICAL RECORD: ICD-10-PCS | Mod: CPTII,,, | Performed by: OBSTETRICS & GYNECOLOGY

## 2022-06-01 PROCEDURE — 3008F PR BODY MASS INDEX (BMI) DOCUMENTED: ICD-10-PCS | Mod: CPTII,,, | Performed by: OBSTETRICS & GYNECOLOGY

## 2022-06-01 PROCEDURE — 81003 URINALYSIS AUTO W/O SCOPE: CPT | Mod: QW,,, | Performed by: CLINICAL MEDICAL LABORATORY

## 2022-06-01 PROCEDURE — 85025 COMPLETE CBC W/AUTO DIFF WBC: CPT | Mod: ,,, | Performed by: CLINICAL MEDICAL LABORATORY

## 2022-06-01 PROCEDURE — 82270 PR BLOOD OCCULT, BY PEROX, FECES, SINGLE, COLORECT SCRN: ICD-10-PCS | Mod: ,,, | Performed by: OBSTETRICS & GYNECOLOGY

## 2022-06-01 RX ORDER — ALENDRONATE SODIUM 70 MG/1
70 TABLET ORAL
Qty: 12 TABLET | Refills: 3 | Status: SHIPPED | OUTPATIENT
Start: 2022-06-01 | End: 2023-05-16 | Stop reason: SDUPTHER

## 2022-06-01 RX ORDER — NITROFURANTOIN 25; 75 MG/1; MG/1
100 CAPSULE ORAL 2 TIMES DAILY
Qty: 20 CAPSULE | Refills: 1 | Status: SHIPPED | OUTPATIENT
Start: 2022-06-01 | End: 2022-06-11

## 2022-06-01 RX ORDER — SOLIFENACIN SUCCINATE 5 MG/1
5 TABLET, FILM COATED ORAL DAILY
Qty: 90 TABLET | Refills: 3 | Status: SHIPPED | OUTPATIENT
Start: 2022-06-01 | End: 2023-03-14 | Stop reason: SDUPTHER

## 2022-06-01 RX ORDER — ESCITALOPRAM OXALATE 10 MG/1
10 TABLET ORAL DAILY
Qty: 90 TABLET | Refills: 3 | Status: SHIPPED | OUTPATIENT
Start: 2022-06-01 | End: 2022-06-29 | Stop reason: ALTCHOICE

## 2022-06-01 RX ORDER — ESTRADIOL 0.1 MG/G
1 CREAM VAGINAL
Qty: 1 EACH | Refills: 2 | Status: SHIPPED | OUTPATIENT
Start: 2022-06-02 | End: 2023-03-14 | Stop reason: SDUPTHER

## 2022-06-01 NOTE — PATIENT INSTRUCTIONS
Dr. Manuel Contreras thanks you for this annual exam visit at [facility].    Please remember to perform monthly breast self exams. If you are over 40 years of age, Dr. Contreras recommends yearly mammograms. Please check with your insurance carrier for mammogram insurance coverage.    Colonoscopy indication:      Low risk family history: schedule after age 50 for initial evaluation by a GI specialist.    High risk family history: Schedule before age 50 for initial evaluation by a GI specialist. High risk family history includes history of colon cancer in an offspring, brother or sister or parent.    The Annual Exam or periodic exam may includes thin prep Pap smear and appropriate ancillary labs as allowed by your insurance coverage.The studies Dr. Manuel Contreras ordered has been checked by our [email or web address] Electronic Medical Record software today.     Please use perscribed medications as directed.    Special considerations after this visit:  Age-related screening labs; her Hemoccult screen was negative today; no Pap smear this year; initiate VESIcare for possible overactive bladder issues and short course of Macrobid; issue a topical vaginal estrogen twice a week indefinite due to atrophic vaginitis; order pelvic ultrasound due to a hypogastric discomfort/pressure complaint.  She was advised there is no evidence of pelvic relaxation.    Please practice best food and exercise habits for your age.    Dr. Manuel Contreras recommends avoidance of smoking and illicit medications or habits.    Please call or schedule for any change in your health.     Dr. Manuel Contreras recommends yearly exams for good health maintenance even if you pap smear schedule (as allowed by your health insurance coverage)  does not allow yearly pap testing.    Dr. Manuel Contreras    06/01/2022 3:29 PM

## 2022-06-10 ENCOUNTER — HOSPITAL ENCOUNTER (OUTPATIENT)
Dept: RADIOLOGY | Facility: HOSPITAL | Age: 72
Discharge: HOME | End: 2022-06-10
Attending: OBSTETRICS & GYNECOLOGY
Payer: MEDICARE

## 2022-06-10 DIAGNOSIS — R10.2 PELVIC PAIN IN FEMALE: ICD-10-CM

## 2022-06-10 DIAGNOSIS — M54.10 BILATERAL RADIATING LEG PAIN: ICD-10-CM

## 2022-06-10 DIAGNOSIS — R35.0 URINARY FREQUENCY: ICD-10-CM

## 2022-06-10 DIAGNOSIS — R10.2 PELVIC PRESSURE IN FEMALE: ICD-10-CM

## 2022-06-10 PROCEDURE — 76856 US EXAM PELVIC COMPLETE: CPT | Mod: TC

## 2022-06-10 PROCEDURE — 76856 US EXAM PELVIC COMPLETE: CPT | Mod: 26,,, | Performed by: STUDENT IN AN ORGANIZED HEALTH CARE EDUCATION/TRAINING PROGRAM

## 2022-06-10 PROCEDURE — 76856 US PELVIS COMPLETE NON OB: ICD-10-PCS | Mod: 26,,, | Performed by: STUDENT IN AN ORGANIZED HEALTH CARE EDUCATION/TRAINING PROGRAM

## 2022-06-13 NOTE — PROGRESS NOTES
[facility]     PATIENT NAME: Chrissie Cunningham   : 1950    AGE: 71 y.o. DATE: 2022   MRN: [id number]        Reason for Visit / Chief Complaint: Medicare AWV (Initial [employer] AWV visit )        Chrissie Cunningham presents for an Initial [employer] AWV today.     The following components were reviewed and updated:    Review of Systems   Constitutional: Negative for fever, malaise/fatigue and weight loss.   HENT: Negative.    Eyes: Negative.    Respiratory: Negative for cough, shortness of breath and wheezing.    Cardiovascular: Negative for chest pain, orthopnea and leg swelling.   Gastrointestinal: Negative for abdominal pain, constipation, diarrhea, heartburn and nausea.   Genitourinary: Negative for dysuria.   Skin: Negative.    Neurological: Positive for tingling. Negative for dizziness, weakness and headaches.        Numbness in extremities   Endo/Heme/Allergies: Negative.    Psychiatric/Behavioral: Negative.        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 Church    BV (bacterial vaginosis) 2011    Cataract     Chronic fibrocystic breast disease (FCBD) in female 2007    chronic bilaterally    GERD (gastroesophageal reflux disease)     Hypercholesterolemia 2004    Cholesterol 242 mg/dl    Hyperlipidemia     Hyperlipidemia 2004     mg/dl;  HDL 66 mg/dl;   mg/dl    Hypertension     Keloid of skin 2020    probably from scratching;  left and right side of mons pubis    Neuropathy     OAB (overactive bladder) 2017    by Urodynamics Study    Osteopenia 2013    as noted on 10/2011    Spinal stenosis 10/08/2013    on the left on neck x-ray    Spondylosis 10/08/2013    Stress incontinence, female 2017     by Urodynamics Study    Urinary incontinence, mixed 2017    by Urodynamics Study        Family History   Problem Relation Age of Onset    Prostate cancer Father     Diabetes Father     Hypertension Father     Hypertension Mother     Breast cancer Sister     Breast cancer Sister     Hyperlipidemia Neg Hx     Stroke Neg Hx         Past Surgical History:   Procedure Laterality Date     SECTION      CYST REMOVAL      EYE SURGERY      HYSTERECTOMY      TRIGGER FINGER RELEASE Right 2022    Procedure: RELEASE, TRIGGER FINGER, THUMB;  Surgeon: Jayant Mandujano III, MD;  Location: [facility];  Service: Orthopedics;  Laterality: Right;       Social History     Tobacco Use   Smoking Status Never Smoker   Smokeless Tobacco Never Used       Social History     Substance and Sexual Activity   Alcohol Use Never         · Allergies and Current Medications     Review of patient's allergies indicates:   Allergen Reactions    Fish containing products     Hydrocodone-acetaminophen      Other reaction(s): Unknown       Current Outpatient Medications:     acyclovir (ZOVIRAX) 400 MG tablet, Take 1 tablet (400 mg total) by mouth 2 (two) times daily., Disp: 180 tablet, Rfl: 3    alendronate (FOSAMAX) 70 MG tablet, Take 1 tablet (70 mg total) by mouth every 7 days., Disp: 12 tablet, Rfl: 3    diclofenac (CATAFLAM) 50 MG tablet, Take 1 tablet by mouth 4 times daily, Disp: 120 tablet, Rfl: 0    DICLOFENAC POTASSIUM ORAL, Take 50 mg by mouth 4 (four) times daily., Disp: , Rfl:     EScitalopram oxalate (LEXAPRO) 10 MG tablet, Take 1 tablet (10 mg total) by mouth once daily., Disp: 90 tablet, Rfl: 3    estradioL (ESTRACE) 0.01 % (0.1 mg/gram) vaginal cream, Place 1 g vaginally twice a week., Disp: 1 each, Rfl: 2    HYDROcodone-acetaminophen (NORCO) 5-325 mg per tablet, Take 1 tablet by mouth every 4 (four) hours as needed for Pain., Disp: 12 tablet, Rfl: 0    hydrOXYzine HCL (ATARAX) 25 MG  tablet, Take 1 tablet (25 mg total) by mouth daily as needed for Itching., Disp: 15 tablet, Rfl: 0    pramipexole (MIRAPEX) 0.25 MG tablet, Take 1 tablet by mouth once daily., Disp: , Rfl:     pregabalin (LYRICA) 75 MG capsule, Take one capsule three times a day, Disp: 90 capsule, Rfl: 3    solifenacin (VESICARE) 5 MG tablet, Take 1 tablet (5 mg total) by mouth once daily., Disp: 90 tablet, Rfl: 3    atorvastatin (LIPITOR) 20 MG tablet, , Disp: , Rfl:     cyclobenzaprine (FLEXERIL) 10 MG tablet, Take 1 tablet (10 mg total) by mouth 3 (three) times daily as needed for Muscle spasms., Disp: 30 tablet, Rfl: 1    NIFEdipine (ADALAT CC) 60 MG TbSR, Take 1 tablet (60 mg total) by mouth once daily., Disp: 90 tablet, Rfl: 1    omeprazole (PRILOSEC) 40 MG capsule, Take 1 capsule (40 mg total) by mouth once daily., Disp: 90 capsule, Rfl: 1      · Health Risk Assessment   Fall Risk:  At risk uses cane and fall risk education reviewed   Obesity: BMI Body mass index is 31.71 kg/m².   Advance Directive: no but information given   Depression: PHQ9- 2   HTN:  DASH diet, exercise, weight management, med compliance, home BP monitoring, and follow-up discussed.   T2DM: no.  STI: not at risk   Statin Use: yes      · Health Maintenance   Last eye exam: states had 5/20/22 with Dr. Omalley   Last CV screen with lipids: drawn this visit   Diabetes screening with fasting glucose or A1c: 6/1/22   Colonoscopy: 10/19/18 Dr. Hazel -repeat in 10 years   Flu Vaccine: end of season and declines   Pneumonia vaccines: declined   COVID vaccine: (moderna) 2/20/21, 3/18/21, 10/26/21   Hep B vaccine: na   DEXA: na   Last pap/pelvic: 2/2/21-Dr. Contreras   Last Mammogram: 7/28/21   AAA screening: na   HIV Screening: not at risk  Hepatitis C Screen: drawn this visit  Low Dose CT Scan: na    Health Maintenance Topics with due status: Not Due       Topic Last Completion Date    Colorectal Cancer Screening 10/19/2018    DEXA Scan 02/18/2021    Mammogram  09/14/2021    Influenza Vaccine Not Due     Health Maintenance Due   Topic Date Due    Hepatitis C Screening  Never done    Lipid Panel  Never done    TETANUS VACCINE  Never done    Shingles Vaccine (1 of 2) Never done    Pneumococcal Vaccines (Age 65+) (1 - PCV) Never done    COVID-19 Vaccine (4 - Booster for Moderna series) 02/26/2022         Lab results available in Epic or see dates from [facility] above:   Lab Results   Component Value Date    CHOL 224 02/02/2021     Lab Results   Component Value Date    HDL 67 02/02/2021     Lab Results   Component Value Date    LDLCALC 133 02/02/2021     Lab Results   Component Value Date    TRIG 118 02/02/2021     Lab Results   Component Value Date    CHOLHDL 3.3 02/02/2021       Lab Results   Component Value Date    HGBA1C 5.1 08/26/2021       Sodium   Date Value Ref Range Status   06/01/2022 138 136 - 145 mmol/L Final     Potassium   Date Value Ref Range Status   06/01/2022 3.9 3.5 - 5.1 mmol/L Final     Chloride   Date Value Ref Range Status   06/01/2022 102 98 - 107 mmol/L Final     CO2   Date Value Ref Range Status   06/01/2022 29 21 - 32 mmol/L Final     Glucose   Date Value Ref Range Status   06/01/2022 78 74 - 106 mg/dL Final     BUN   Date Value Ref Range Status   06/01/2022 11 7 - 18 mg/dL Final     Creatinine   Date Value Ref Range Status   06/01/2022 0.79 0.55 - 1.02 mg/dL Final     Calcium   Date Value Ref Range Status   06/01/2022 8.8 8.5 - 10.1 mg/dL Final     Total Protein   Date Value Ref Range Status   06/01/2022 7.7 6.4 - 8.2 g/dL Final     Albumin   Date Value Ref Range Status   06/01/2022 4.1 3.5 - 5.0 g/dL Final     Bilirubin, Total   Date Value Ref Range Status   06/01/2022 0.6 0.0 - 1.2 mg/dL Final     Alk Phos   Date Value Ref Range Status   06/01/2022 61 55 - 142 U/L Final     AST   Date Value Ref Range Status   06/01/2022 20 15 - 37 U/L Final     ALT   Date Value Ref Range Status   06/01/2022 13 13 - 56 U/L Final     Anion Gap   Date Value Ref Range  "Status   06/01/2022 11 7 - 16 mmol/L Final     eGFR    Date Value Ref Range Status   12/20/2021 72 >=60 mL/min/1.73m² Final     eGFR   Date Value Ref Range Status   06/01/2022 76 >=60 mL/min/1.73m² Final            Incontinence  Bowel: no  Bladder: no      · Care Team  Dr. Wiley  - PCP                 Dr. Omalley-optometry                 SUZANNE Morgan FNP-neurology                            Dr. Contreras-gynecology    **See Completed Assessments for Annual Wellness visit within the encounter summary    The following assessments were completed & reviewed:  · Depression Screening  · Cognitive function Screening  · Timed Get Up Test  · Whisper Test  · Vision Screen  · Health Risk Assessment  · Checklist of ADLs and IADLs        Objective  Vitals:    06/14/22 0907 06/14/22 0920   BP: (!) 140/74 (!) 150/78   Pulse: (!) 55 (!) 52   Resp: 16    Temp: 98.2 °F (36.8 °C)    TempSrc: Oral    SpO2: 97%    Weight: 81.2 kg (179 lb)    Height: 5' 3" (1.6 m)    PainSc:   7    PainLoc: Hip       Body mass index is 31.71 kg/m².  Ideal body weight: 52.4 kg (115 lb 8.3 oz)       Physical Exam  Constitutional:       General: She is not in acute distress.     Appearance: Normal appearance.   HENT:      Head: Normocephalic.      Mouth/Throat:      Mouth: Mucous membranes are moist.   Cardiovascular:      Rate and Rhythm: Normal rate and regular rhythm.      Pulses: Normal pulses.      Heart sounds: Normal heart sounds. No murmur heard.  Pulmonary:      Effort: Pulmonary effort is normal. No respiratory distress.      Breath sounds: Normal breath sounds. No wheezing, rhonchi or rales.   Abdominal:      General: Bowel sounds are normal.      Palpations: Abdomen is soft.      Tenderness: There is no abdominal tenderness.   Musculoskeletal:         General: Normal range of motion.      Cervical back: Neck supple.   Skin:     General: Skin is warm and dry.   Neurological:      Mental Status: She is alert and oriented to person, " place, and time.   Psychiatric:         Mood and Affect: Mood normal.         Behavior: Behavior normal.           Assessment:     1. Encounter for initial annual wellness visit (AWV) in Medicare patient    2. Hypertension, unspecified type  - Lipid Panel; Future  - NIFEdipine (ADALAT CC) 60 MG TbSR; Take 1 tablet (60 mg total) by mouth once daily.  Dispense: 90 tablet; Refill: 1  - Lipid Panel    3. Hyperlipidemia, unspecified hyperlipidemia type  - Lipid Panel; Future  - Lipid Panel    4. Hypercholesterolemia  - Lipid Panel; Future  - Lipid Panel    5. Osteopenia, unspecified location    6. BMI 31.0-31.9,adult    7. Screening for viral disease  - Hepatitis C Antibody; Future  - Hepatitis C Antibody    8. Gastroesophageal reflux disease without esophagitis  - omeprazole (PRILOSEC) 40 MG capsule; Take 1 capsule (40 mg total) by mouth once daily.  Dispense: 90 capsule; Refill: 1         Plan:    Referrals:   None     Advised to call office if does not hear from anyone with referral appt within 2-3 weeks to check on status of referral. Voiced understanding.      Discussed and provided with a screening schedule and personal prevention plan in accordance with USPSTF age appropriate recommendations and Medicare screening guidelines.   Education, counseling, and referrals were provided as needed.  After Visit Summary printed and given to patient which includes written education and a list of any referrals if indicated.     Education including diet, exercise, falls, preventive health care for older adults and advanced directives discussed with patient and patient verbalized understanding.      F/u plan for yearly AWV.    Signature: Adelaida Chen [facility]

## 2022-06-14 ENCOUNTER — OFFICE VISIT (OUTPATIENT)
Dept: FAMILY MEDICINE | Facility: CLINIC | Age: 72
End: 2022-06-14
Payer: MEDICARE

## 2022-06-14 VITALS
DIASTOLIC BLOOD PRESSURE: 78 MMHG | BODY MASS INDEX: 31.71 KG/M2 | HEIGHT: 63 IN | SYSTOLIC BLOOD PRESSURE: 150 MMHG | RESPIRATION RATE: 16 BRPM | OXYGEN SATURATION: 97 % | HEART RATE: 52 BPM | WEIGHT: 179 LBS | TEMPERATURE: 98 F

## 2022-06-14 DIAGNOSIS — M85.80 OSTEOPENIA, UNSPECIFIED LOCATION: ICD-10-CM

## 2022-06-14 DIAGNOSIS — K21.9 GASTROESOPHAGEAL REFLUX DISEASE WITHOUT ESOPHAGITIS: ICD-10-CM

## 2022-06-14 DIAGNOSIS — I10 HYPERTENSION, UNSPECIFIED TYPE: ICD-10-CM

## 2022-06-14 DIAGNOSIS — E78.5 HYPERLIPIDEMIA, UNSPECIFIED HYPERLIPIDEMIA TYPE: ICD-10-CM

## 2022-06-14 DIAGNOSIS — Z00.00 ENCOUNTER FOR INITIAL ANNUAL WELLNESS VISIT (AWV) IN MEDICARE PATIENT: Primary | ICD-10-CM

## 2022-06-14 DIAGNOSIS — Z11.59 SCREENING FOR VIRAL DISEASE: ICD-10-CM

## 2022-06-14 DIAGNOSIS — E78.00 HYPERCHOLESTEROLEMIA: ICD-10-CM

## 2022-06-14 LAB
CHOLEST SERPL-MCNC: 268 MG/DL (ref 0–200)
CHOLEST/HDLC SERPL: 4.1 {RATIO}
HCV AB SER QL: NORMAL
HDLC SERPL-MCNC: 66 MG/DL (ref 40–60)
LDLC SERPL CALC-MCNC: 185 MG/DL
LDLC/HDLC SERPL: 2.8 {RATIO}
NONHDLC SERPL-MCNC: 202 MG/DL
TRIGL SERPL-MCNC: 84 MG/DL (ref 35–150)
VLDLC SERPL-MCNC: 17 MG/DL

## 2022-06-14 PROCEDURE — 3008F PR BODY MASS INDEX (BMI) DOCUMENTED: ICD-10-PCS | Mod: ,,, | Performed by: NURSE PRACTITIONER

## 2022-06-14 PROCEDURE — 3288F PR FALLS RISK ASSESSMENT DOCUMENTED: ICD-10-PCS | Mod: ,,, | Performed by: NURSE PRACTITIONER

## 2022-06-14 PROCEDURE — 80061 LIPID PANEL: ICD-10-PCS | Mod: ,,, | Performed by: CLINICAL MEDICAL LABORATORY

## 2022-06-14 PROCEDURE — 3008F BODY MASS INDEX DOCD: CPT | Mod: ,,, | Performed by: NURSE PRACTITIONER

## 2022-06-14 PROCEDURE — 80061 LIPID PANEL: CPT | Mod: ,,, | Performed by: CLINICAL MEDICAL LABORATORY

## 2022-06-14 PROCEDURE — 1125F PR PAIN SEVERITY QUANTIFIED, PAIN PRESENT: ICD-10-PCS | Mod: ,,, | Performed by: NURSE PRACTITIONER

## 2022-06-14 PROCEDURE — 1100F PR PT FALLS ASSESS DOC 2+ FALLS/FALL W/INJURY/YR: ICD-10-PCS | Mod: ,,, | Performed by: NURSE PRACTITIONER

## 2022-06-14 PROCEDURE — 86803 HEPATITIS C ANTIBODY: ICD-10-PCS | Mod: ,,, | Performed by: CLINICAL MEDICAL LABORATORY

## 2022-06-14 PROCEDURE — 1160F RVW MEDS BY RX/DR IN RCRD: CPT | Mod: ,,, | Performed by: NURSE PRACTITIONER

## 2022-06-14 PROCEDURE — G0438 PPPS, INITIAL VISIT: HCPCS | Mod: ,,, | Performed by: NURSE PRACTITIONER

## 2022-06-14 PROCEDURE — 1100F PTFALLS ASSESS-DOCD GE2>/YR: CPT | Mod: ,,, | Performed by: NURSE PRACTITIONER

## 2022-06-14 PROCEDURE — 1160F PR REVIEW ALL MEDS BY PRESCRIBER/CLIN PHARMACIST DOCUMENTED: ICD-10-PCS | Mod: ,,, | Performed by: NURSE PRACTITIONER

## 2022-06-14 PROCEDURE — 3078F DIAST BP <80 MM HG: CPT | Mod: ,,, | Performed by: NURSE PRACTITIONER

## 2022-06-14 PROCEDURE — 3288F FALL RISK ASSESSMENT DOCD: CPT | Mod: ,,, | Performed by: NURSE PRACTITIONER

## 2022-06-14 PROCEDURE — 86803 HEPATITIS C AB TEST: CPT | Mod: ,,, | Performed by: CLINICAL MEDICAL LABORATORY

## 2022-06-14 PROCEDURE — 3078F PR MOST RECENT DIASTOLIC BLOOD PRESSURE < 80 MM HG: ICD-10-PCS | Mod: ,,, | Performed by: NURSE PRACTITIONER

## 2022-06-14 PROCEDURE — 1159F MED LIST DOCD IN RCRD: CPT | Mod: ,,, | Performed by: NURSE PRACTITIONER

## 2022-06-14 PROCEDURE — G0438 PR WELCOME MEDICARE ANNUAL WELLNESS INITIAL VISIT: ICD-10-PCS | Mod: ,,, | Performed by: NURSE PRACTITIONER

## 2022-06-14 PROCEDURE — 3077F SYST BP >= 140 MM HG: CPT | Mod: ,,, | Performed by: NURSE PRACTITIONER

## 2022-06-14 PROCEDURE — 1159F PR MEDICATION LIST DOCUMENTED IN MEDICAL RECORD: ICD-10-PCS | Mod: ,,, | Performed by: NURSE PRACTITIONER

## 2022-06-14 PROCEDURE — 1125F AMNT PAIN NOTED PAIN PRSNT: CPT | Mod: ,,, | Performed by: NURSE PRACTITIONER

## 2022-06-14 PROCEDURE — 3077F PR MOST RECENT SYSTOLIC BLOOD PRESSURE >= 140 MM HG: ICD-10-PCS | Mod: ,,, | Performed by: NURSE PRACTITIONER

## 2022-06-14 RX ORDER — OMEPRAZOLE 40 MG/1
40 CAPSULE, DELAYED RELEASE ORAL DAILY
Qty: 90 CAPSULE | Refills: 1 | Status: SHIPPED | OUTPATIENT
Start: 2022-06-14 | End: 2022-12-20

## 2022-06-14 RX ORDER — NIFEDIPINE 60 MG/1
60 TABLET, EXTENDED RELEASE ORAL DAILY
Qty: 90 TABLET | Refills: 1 | Status: SHIPPED | OUTPATIENT
Start: 2022-06-14 | End: 2023-01-17

## 2022-06-14 RX ORDER — CYCLOBENZAPRINE HCL 10 MG
10 TABLET ORAL 3 TIMES DAILY PRN
Qty: 30 TABLET | Refills: 1 | Status: SHIPPED | OUTPATIENT
Start: 2022-06-14 | End: 2022-08-11

## 2022-06-14 NOTE — PATIENT INSTRUCTIONS
Counseling and Referral of Other Preventative  (Italic type indicates deductible and co-insurance are waived)    Patient Name: Chrissie Cunningham  Today's Date: 6/14/2022    Health Maintenance         Date Due Completion Date    Hepatitis C Screening Never done ---    Lipid Panel Never done ---    COVID-19 Vaccine (1) Never done ---    TETANUS VACCINE Never done ---    Shingles Vaccine (1 of 2) Never done ---    Pneumococcal Vaccines (Age 65+) (1 - PCV) Never done ---    Influenza Vaccine (Season Ended) 09/01/2022 ---    Mammogram 09/14/2022 9/14/2021    DEXA Scan 02/18/2024 2/18/2021    Colorectal Cancer Screening 10/19/2028 10/19/2018          No orders of the defined types were placed in this encounter.

## 2022-06-23 DIAGNOSIS — G25.81 RLS (RESTLESS LEGS SYNDROME): Chronic | ICD-10-CM

## 2022-06-23 DIAGNOSIS — G62.9 POLYNEUROPATHY: Chronic | ICD-10-CM

## 2022-06-23 RX ORDER — PREGABALIN 75 MG/1
CAPSULE ORAL
Qty: 90 CAPSULE | Refills: 0 | OUTPATIENT
Start: 2022-06-23

## 2022-06-28 NOTE — PROGRESS NOTES
Subjective:       Patient ID: Chrissie Cunningham is a 71 y.o. female.    Chief Complaint:  No chief complaint on file.      History of Present Illness  This pleasant 71 year old right handed  female presents to clinic for follow up of neuropathy, bilateral CTS and RLS. She also has a new complaint of falling. Patient states she had three falls in the last month. She denies any head injury and did not seek any medical attention for the falls until today. She does report low back pain, right hip pain, and right knee pain. Discussed getting xray's, physical therapy and fall precautions in detail. She is in agreement with this. She is pleased today with the treatment of the CTS and RLS.  She reported January 2022 what she thought were side effects of her Lyrica that was effecting her mouth with a rash and pain. After being off the medication for about a month and allergy testing the patient and her PCP determined that it was not the Lyrica causing these problems. She then restarted her Lyrica 75 mg one three times a day. She states today that she has not taken the Lyrica as prescribed.  was checked and supports her not taking this medication in April or May but restarted in June 2022. Discussed tapering off the Lyrica and Lexapro as detailed in the plan. She will start Cymbalta 30 mg at bedtime for the Neuropathy and Depression. Discussed the side effects in detail and she is in agreement with this. She states the Lexapro has not been helping with her depression.   Patient states neuropathy started 2 years ago with burning, numbness, and tingling that she rates today as a 4 on a scale of 0-5.  Symptoms are located in the bilateral legs, feet, and bilateral hands that is worse in the legs and feet. Symptoms are intermittent with pain rated as a 5 on a scale of 0-10 and described as a stinging pain that is worse during the daytime.  Precipitating factors are prolonged sitting, activity and walking. She denies  diabetes, back injury or neck injury. She  has chronic neck pain from neck surgery in 2013. She has been evaluated by vascular surgeon Dr. EVER Hernandez in the past.  Arterial doppler with exercise done on September 22, 2020 showed no significant disease when compared to the resting study done in August 2020. Venous doppler done on August 5, 2020 was negative for DVT. Recent EMG NCS supports CTS but was negative in the legs. She was referred to orthopedics at the last visit to evaluate and treat the CTS. She reports having trigger finger surgery and the CTS is greatly improved. She also reports symptoms of RLS with cramps with the urge to move her legs that is relieved with movement but then returns. She states her PCP has her on Mirapex for the RLS. She denies smoking or drinking alcohol and had a total hysterectomy in 2012. Discussed EMG NCS results and lab results from last visit. Discussed the plan in detail with Neurologist Dr. DORIS Pack and the patient and she is in agreement with the plan. All her questions were answered at today's visit.  was checked for the Lyrica.       Past Neuropathy medications: Gabapentin, Lyrica     EMG NCS of all 4 extremities done on July 12, 2021 at the [facility] by Dr. KEIRY Olguin showed predominantly sensory peripheral neuropathy. Bilateral median nerve entrapment neuropathy at the carpal tunnel, left greater than right. Normal EMG of the bilateral lower extremities with no electrophysiological evidence of denervation or neuropathic changes.        Review of Systems  Review of Systems   Constitutional: Negative for activity change, diaphoresis, fever and unexpected weight change.   HENT: Negative for congestion, ear pain, facial swelling, hearing loss, tinnitus, trouble swallowing and voice change.    Eyes: Negative for photophobia, pain and visual disturbance.   Respiratory: Negative for chest tightness, shortness of breath and wheezing.    Cardiovascular:  Negative for chest pain, palpitations and leg swelling.   Gastrointestinal: Negative for constipation, diarrhea, nausea and vomiting.   Genitourinary: Negative for difficulty urinating.   Musculoskeletal: Positive for back pain and gait problem. Negative for neck pain and neck stiffness.        Right hip and right knee pain   Skin: Negative for color change, pallor, rash and wound.   Neurological: Positive for numbness. Negative for dizziness, tremors, seizures, syncope, facial asymmetry, speech difficulty, weakness, light-headedness and headaches.        RLS        CTS   Psychiatric/Behavioral: Negative for agitation, behavioral problems, confusion, decreased concentration and hallucinations. The patient is not nervous/anxious and is not hyperactive.        Objective:      Neurologic Exam     Mental Status   Oriented to person, place, and time.   Oriented to person.   Oriented to place.   Oriented to time.   Attention: normal. Concentration: normal.   Speech: speech is normal   Level of consciousness: alert  Knowledge: good.     Cranial Nerves     CN II   Visual fields full to confrontation.     CN III, IV, VI   Pupils are equal, round, and reactive to light.  Extraocular motions are normal.   Right pupil: Size: 3 mm. Shape: regular. Reactivity: brisk.   Left pupil: Size: 3 mm. Shape: regular. Reactivity: brisk.   CN III: no CN III palsy  CN VI: no CN VI palsy    CN V   Facial sensation intact.     CN VII   Facial expression full, symmetric.     CN VIII   CN VIII normal.   Hearing: intact    CN IX, X   CN IX normal.   CN X normal.     CN XI   CN XI normal.     CN XII   CN XII normal.     Motor Exam   Muscle bulk: normal  Overall muscle tone: normal  Right arm pronator drift: absent  Left arm pronator drift: absent    Strength   Right deltoid: 5/5  Left deltoid: 5/5  Right biceps: 5/5  Left biceps: 5/5  Right triceps: 5/5  Left triceps: 5/5  Right iliopsoas: 4/5  Right quadriceps: 4/5  Left quadriceps: 5/5  Right  hamstrin/5  Left hamstrin/5    Sensory Exam   Light touch normal.     Gait, Coordination, and Reflexes     Gait  Gait: normal    Coordination   Romberg: negative  Finger to nose coordination: normal  Heel to shin coordination: normal  Tandem walking coordination: normal    Tremor   Resting tremor: absent  Intention tremor: absent  Action tremor: absent    Reflexes   Right brachioradialis: 2+  Left brachioradialis: 2+  Right biceps: 2+  Left biceps: 2+  Right triceps: 2+  Left triceps: 2+  Right patellar: 2+  Left patellar: 2+  Right achilles: 2+  Left achilles: 2+  Right : 4+  Left : 4+  Right Kidd: absent  Left Kidd: absent  Ambulates with a cane       Physical Exam  Constitutional:       General: She is not in acute distress.  HENT:      Head: Normocephalic.      Nose: Nose normal.      Mouth/Throat:      Mouth: Mucous membranes are moist.   Eyes:      Extraocular Movements: Extraocular movements intact and EOM normal.      Pupils: Pupils are equal, round, and reactive to light.   Cardiovascular:      Rate and Rhythm: Normal rate and regular rhythm.      Heart sounds: Normal heart sounds. No murmur heard.  Pulmonary:      Effort: Pulmonary effort is normal. No respiratory distress.      Breath sounds: Normal breath sounds. No wheezing, rhonchi or rales.   Musculoskeletal:         General: No swelling, tenderness, deformity or signs of injury. Normal range of motion.      Cervical back: Normal range of motion. No rigidity or tenderness.      Right lower leg: No edema.      Left lower leg: No edema.   Skin:     General: Skin is warm and dry.      Capillary Refill: Capillary refill takes less than 2 seconds.      Coloration: Skin is not jaundiced or pale.      Findings: No bruising, erythema, lesion or rash.   Neurological:      Mental Status: She is alert and oriented to person, place, and time.      Coordination: Finger-Nose-Finger Test, Heel to Shin Test and Romberg Test normal.      Gait:  Gait is intact. Tandem walk normal.      Deep Tendon Reflexes:      Reflex Scores:       Tricep reflexes are 2+ on the right side and 2+ on the left side.       Bicep reflexes are 2+ on the right side and 2+ on the left side.       Brachioradialis reflexes are 2+ on the right side and 2+ on the left side.       Patellar reflexes are 2+ on the right side and 2+ on the left side.       Achilles reflexes are 2+ on the right side and 2+ on the left side.  Psychiatric:         Mood and Affect: Mood normal.         Speech: Speech normal.         Behavior: Behavior normal.           Assessment:     Problem List Items Addressed This Visit        Neuro    Polyneuropathy - Primary (Chronic)    Relevant Medications    DULoxetine (CYMBALTA) 30 MG capsule    Other Relevant Orders    Vitamin B12 & Folate    Protein Electrophoresis, Serum with Reflex SUNIL    MATEO EIA w/ Reflex to dsDNA/ESA    Syphilis Antibody with reflex to RPR    RLS (restless legs syndrome) (Chronic)    Relevant Orders    Vitamin B12 & Folate    Protein Electrophoresis, Serum with Reflex SUNIL    MATEO EIA w/ Reflex to dsDNA/ESA    Syphilis Antibody with reflex to RPR    Bilateral carpal tunnel syndrome (Chronic)    Relevant Medications    DULoxetine (CYMBALTA) 30 MG capsule       Orthopedic    Fall    Relevant Orders    X-Ray Lumbar Spine AP And Lateral    X-Ray Hip 2 or 3 views Right (with Pelvis when performed)    X-Ray Knee 3 View Right       Palliative Care    On long term drug therapy     Relevant Orders    Vitamin B12 & Folate      Other Visit Diagnoses     Low back pain, unspecified back pain laterality, unspecified chronicity, unspecified whether sciatica present        Relevant Orders    X-Ray Lumbar Spine AP And Lateral    Right hip pain        Relevant Orders    X-Ray Hip 2 or 3 views Right (with Pelvis when performed)    Right knee pain, unspecified chronicity        Relevant Orders    X-Ray Knee 3 View Right            Plan:       1. Xray lumbar spine,  right hip and right knee  2.   Decrease Lyrica 75 mg to one capsule two times a day for 3 days then one at bedtime for 3 days, then discontinue  3.   Decrease lexapro to half a tablet daily for one week then discontinue  4.   Rx Cymbalta 30 mg one at bedtime, discussed side effects, call us for worsening depression, do not start until taper of Lexapro is completed  5.   Rx physical therapy to evaluate and treat gait and balance  6.   Labs: b12, folate, SPEP, MATEO, RPR  7.   Fall precautions  8.   Regular follow up with PCP for medical management and labs  9.   Follow up with neurology in 3 months or sooner if needed

## 2022-06-29 ENCOUNTER — OFFICE VISIT (OUTPATIENT)
Dept: NEUROLOGY | Facility: CLINIC | Age: 72
End: 2022-06-29
Payer: MEDICARE

## 2022-06-29 VITALS
HEIGHT: 63 IN | OXYGEN SATURATION: 95 % | BODY MASS INDEX: 31.42 KG/M2 | WEIGHT: 177.31 LBS | HEART RATE: 71 BPM | SYSTOLIC BLOOD PRESSURE: 136 MMHG | DIASTOLIC BLOOD PRESSURE: 80 MMHG

## 2022-06-29 DIAGNOSIS — M25.551 RIGHT HIP PAIN: ICD-10-CM

## 2022-06-29 DIAGNOSIS — Z79.899 ON LONG TERM DRUG THERAPY: ICD-10-CM

## 2022-06-29 DIAGNOSIS — G62.9 POLYNEUROPATHY: Primary | Chronic | ICD-10-CM

## 2022-06-29 DIAGNOSIS — M25.561 RIGHT KNEE PAIN, UNSPECIFIED CHRONICITY: ICD-10-CM

## 2022-06-29 DIAGNOSIS — G56.03 BILATERAL CARPAL TUNNEL SYNDROME: Chronic | ICD-10-CM
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
## 2022-06-29 DIAGNOSIS — G25.81 RLS (RESTLESS LEGS SYNDROME): Chronic | ICD-10-CM

## 2022-06-29 DIAGNOSIS — M54.50 LOW BACK PAIN, UNSPECIFIED BACK PAIN LATERALITY, UNSPECIFIED CHRONICITY, UNSPECIFIED WHETHER SCIATICA PRESENT: ICD-10-CM

## 2022-06-29 PROCEDURE — 99214 OFFICE O/P EST MOD 30 MIN: CPT | Mod: S$PBB,,, | Performed by: NURSE PRACTITIONER

## 2022-06-29 PROCEDURE — 3008F BODY MASS INDEX DOCD: CPT | Mod: CPTII,,, | Performed by: NURSE PRACTITIONER

## 2022-06-29 PROCEDURE — 99214 PR OFFICE/OUTPT VISIT, EST, LEVL IV, 30-39 MIN: ICD-10-PCS | Mod: S$PBB,,, | Performed by: NURSE PRACTITIONER

## 2022-06-29 PROCEDURE — 3288F PR FALLS RISK ASSESSMENT DOCUMENTED: ICD-10-PCS | Mod: CPTII,,, | Performed by: NURSE PRACTITIONER

## 2022-06-29 PROCEDURE — 1159F MED LIST DOCD IN RCRD: CPT | Mod: CPTII,,, | Performed by: NURSE PRACTITIONER

## 2022-06-29 PROCEDURE — 3079F PR MOST RECENT DIASTOLIC BLOOD PRESSURE 80-89 MM HG: ICD-10-PCS | Mod: CPTII,,, | Performed by: NURSE PRACTITIONER

## 2022-06-29 PROCEDURE — 3079F DIAST BP 80-89 MM HG: CPT | Mod: CPTII,,, | Performed by: NURSE PRACTITIONER

## 2022-06-29 PROCEDURE — 1159F PR MEDICATION LIST DOCUMENTED IN MEDICAL RECORD: ICD-10-PCS | Mod: CPTII,,, | Performed by: NURSE PRACTITIONER

## 2022-06-29 PROCEDURE — 3008F PR BODY MASS INDEX (BMI) DOCUMENTED: ICD-10-PCS | Mod: CPTII,,, | Performed by: NURSE PRACTITIONER

## 2022-06-29 PROCEDURE — 3075F PR MOST RECENT SYSTOLIC BLOOD PRESS GE 130-139MM HG: ICD-10-PCS | Mod: CPTII,,, | Performed by: NURSE PRACTITIONER

## 2022-06-29 PROCEDURE — 1100F PTFALLS ASSESS-DOCD GE2>/YR: CPT | Mod: CPTII,,, | Performed by: NURSE PRACTITIONER

## 2022-06-29 PROCEDURE — 3288F FALL RISK ASSESSMENT DOCD: CPT | Mod: CPTII,,, | Performed by: NURSE PRACTITIONER

## 2022-06-29 PROCEDURE — 1160F RVW MEDS BY RX/DR IN RCRD: CPT | Mod: CPTII,,, | Performed by: NURSE PRACTITIONER

## 2022-06-29 PROCEDURE — 99215 OFFICE O/P EST HI 40 MIN: CPT | Mod: PBBFAC | Performed by: NURSE PRACTITIONER

## 2022-06-29 PROCEDURE — 1100F PR PT FALLS ASSESS DOC 2+ FALLS/FALL W/INJURY/YR: ICD-10-PCS | Mod: CPTII,,, | Performed by: NURSE PRACTITIONER

## 2022-06-29 PROCEDURE — 3075F SYST BP GE 130 - 139MM HG: CPT | Mod: CPTII,,, | Performed by: NURSE PRACTITIONER

## 2022-06-29 PROCEDURE — 1160F PR REVIEW ALL MEDS BY PRESCRIBER/CLIN PHARMACIST DOCUMENTED: ICD-10-PCS | Mod: CPTII,,, | Performed by: NURSE PRACTITIONER

## 2022-06-29 RX ORDER — DULOXETIN HYDROCHLORIDE 30 MG/1
CAPSULE, DELAYED RELEASE ORAL
Qty: 30 CAPSULE | Refills: 3 | Status: SHIPPED | OUTPATIENT
Start: 2022-06-29 | End: 2022-09-29 | Stop reason: SDUPTHER

## 2022-06-29 NOTE — PATIENT INSTRUCTIONS
Xray lumbar spine, right hip and right knee  2.   Decrease Lyrica 75 mg to one capsule two times a day for 3 days then one at bedtime for 3 days, then discontinue  3.   Decrease lexapro to half a tablet daily for one week then discontinue  4.   Rx Cymbalta 30 mg one at bedtime, discussed side effects, call us for worsening depression, do not start until taper of Lexapro is completed  5.   Rx physical therapy to evaluate and treat gait and balance  6.   Labs: b12, folate, SPEP, MATEO, RPR  7.   Fall precautions  8.   Regular follow up with PCP for medical management and labs  9.   Follow up with neurology in 3 months or sooner if needed

## 2022-07-01 ENCOUNTER — TELEPHONE (OUTPATIENT)
Dept: NEUROLOGY | Facility: CLINIC | Age: 72
End: 2022-07-01
Payer: MEDICARE

## 2022-07-01 NOTE — TELEPHONE ENCOUNTER
Pt voiced understanding  ----- Message from Chidi Valenzuela NP sent at 6/30/2022  4:59 PM CDT -----  Please let the patient know her labs looked ok, thanks

## 2022-08-04 ENCOUNTER — OFFICE VISIT (OUTPATIENT)
Dept: OBSTETRICS AND GYNECOLOGY | Facility: CLINIC | Age: 72
End: 2022-08-04
Payer: MEDICARE

## 2022-08-04 VITALS
RESPIRATION RATE: 16 BRPM | HEIGHT: 63 IN | DIASTOLIC BLOOD PRESSURE: 74 MMHG | TEMPERATURE: 98 F | SYSTOLIC BLOOD PRESSURE: 127 MMHG | BODY MASS INDEX: 30.77 KG/M2 | WEIGHT: 173.63 LBS | HEART RATE: 69 BPM

## 2022-08-04 DIAGNOSIS — R10.30 LOWER ABDOMINAL PAIN: Primary | ICD-10-CM

## 2022-08-04 DIAGNOSIS — K59.09 CHRONIC CONSTIPATION: ICD-10-CM

## 2022-08-04 DIAGNOSIS — N32.81 OAB (OVERACTIVE BLADDER): Chronic | ICD-10-CM

## 2022-08-04 PROCEDURE — 3074F SYST BP LT 130 MM HG: CPT | Mod: CPTII,,, | Performed by: OBSTETRICS & GYNECOLOGY

## 2022-08-04 PROCEDURE — 1159F MED LIST DOCD IN RCRD: CPT | Mod: CPTII,,, | Performed by: OBSTETRICS & GYNECOLOGY

## 2022-08-04 PROCEDURE — 99214 OFFICE O/P EST MOD 30 MIN: CPT | Mod: ,,, | Performed by: OBSTETRICS & GYNECOLOGY

## 2022-08-04 PROCEDURE — 3078F PR MOST RECENT DIASTOLIC BLOOD PRESSURE < 80 MM HG: ICD-10-PCS | Mod: CPTII,,, | Performed by: OBSTETRICS & GYNECOLOGY

## 2022-08-04 PROCEDURE — 3074F PR MOST RECENT SYSTOLIC BLOOD PRESSURE < 130 MM HG: ICD-10-PCS | Mod: CPTII,,, | Performed by: OBSTETRICS & GYNECOLOGY

## 2022-08-04 PROCEDURE — 1160F RVW MEDS BY RX/DR IN RCRD: CPT | Mod: CPTII,,, | Performed by: OBSTETRICS & GYNECOLOGY

## 2022-08-04 PROCEDURE — 1160F PR REVIEW ALL MEDS BY PRESCRIBER/CLIN PHARMACIST DOCUMENTED: ICD-10-PCS | Mod: CPTII,,, | Performed by: OBSTETRICS & GYNECOLOGY

## 2022-08-04 PROCEDURE — 3078F DIAST BP <80 MM HG: CPT | Mod: CPTII,,, | Performed by: OBSTETRICS & GYNECOLOGY

## 2022-08-04 PROCEDURE — 3008F PR BODY MASS INDEX (BMI) DOCUMENTED: ICD-10-PCS | Mod: CPTII,,, | Performed by: OBSTETRICS & GYNECOLOGY

## 2022-08-04 PROCEDURE — 1159F PR MEDICATION LIST DOCUMENTED IN MEDICAL RECORD: ICD-10-PCS | Mod: CPTII,,, | Performed by: OBSTETRICS & GYNECOLOGY

## 2022-08-04 PROCEDURE — 3008F BODY MASS INDEX DOCD: CPT | Mod: CPTII,,, | Performed by: OBSTETRICS & GYNECOLOGY

## 2022-08-04 PROCEDURE — 99214 PR OFFICE/OUTPT VISIT, EST, LEVL IV, 30-39 MIN: ICD-10-PCS | Mod: ,,, | Performed by: OBSTETRICS & GYNECOLOGY

## 2022-08-04 NOTE — PATIENT INSTRUCTIONS
Dr. Manuel Contreras thanks you for this office visit at [facility].    Diagnosis for this visit:   Problem List Items Addressed This Visit          Renal/    OAB (overactive bladder) - Primary (Chronic)          Other Visit Diagnoses       Lower abdominal pain        non compliance-no ultrasound yet; intermittent chronic right lower quadrant pain    Relevant Orders    US Pelvis Complete Non OB    Chronic constipation                 The Plan:  Reschedule abdominal/pelvic ultrasound for unexplained right lower quadrant pain that is chronic.  She was advised continue on VESIcare 5 mg daily for overactive bladder issues and relief of nocturia.  Currently medication is working very well to relieve the symptoms.  She does experience dry mouth but has not had worsening of her constipation.      Please practice best food and exercise habits for your age.    Dr. Manuel Contreras recommends avoidance of smoking and illicit medications or habits.    Please call  or schedule for any change in your health.     Please keep the next scheduled appointment or call for any need to change the appointment.     Dr. Manuel Contreras recommends yearly exams for good health maintenance.      Thank you    Dr. Manuel Contreras  08/04/2022 11:17 AM

## 2022-08-04 NOTE — PROGRESS NOTES
Chrissie Cunningham female  for   Chief Complaint   Patient presents with    Follow-up     Follow on Versicare medication          PHI:  Patient is 71 years of age G4 P force complaining of chronic overactive bladder symptoms and nocturia.  Last month she was placed on VESIcare 5 mg daily and she has had complete resolution of overactive bladder issues.  She has no daytime overactive bladder and she has no symptoms of nocturia.  She has no UTI symptoms.  Her last urinalysis was negative.  Since use of VESIcare she does have some dry mouth and she does have a chronic history of constipation.  VESIcare has not made constipation worse.    She has had now for years chronic right lower quadrant pain that has not had the recommended ultrasound yet.  The discomfort is intermittent.  She has no eating disorder, no digestive disorder history and no change in her bowel movements.    Past Medical History:   Diagnosis Date    Arthritis     Benign lipomatous neoplasm of skin and subcutaneous tissue of other sites 08/08/2007    Left upper back lipoma - large - resected 11/2006 at [facility] by Dr. Ranjit CASTRO (bacterial vaginosis) 09/28/2011    Cataract     Chronic fibrocystic breast disease (FCBD) in female 08/08/2007    chronic bilaterally    GERD (gastroesophageal reflux disease)     Hypercholesterolemia 01/14/2004    Cholesterol 242 mg/dl    Hyperlipidemia     Hyperlipidemia 02/18/2004     mg/dl;  HDL 66 mg/dl;   mg/dl    Hypertension     Keloid of skin 01/09/2020    probably from scratching;  left and right side of mons pubis    Neuropathy     OAB (overactive bladder) 06/29/2017    by Urodynamics Study    Osteopenia 02/26/2013    as noted on 10/2011    Spinal stenosis 10/08/2013    on the left on neck x-ray    Spondylosis 10/08/2013    Stress incontinence, female 06/29/2017    by Urodynamics Study    Urinary incontinence, mixed 06/29/2017    by Urodynamics Study      Past Surgical History:   Procedure Laterality  "Date     SECTION      CYST REMOVAL      EYE SURGERY      HYSTERECTOMY      TRIGGER FINGER RELEASE Right 2022    Procedure: RELEASE, TRIGGER FINGER, THUMB;  Surgeon: Jayant Mandujano III, MD;  Location: [facility];  Service: Orthopedics;  Laterality: Right;      Review of patient's allergies indicates:   Allergen Reactions    Fish containing products     Hydrocodone-acetaminophen      Other reaction(s): Unknown    Penicillins         ROS:Pertinent items are noted in HPI.    Physical exam:    /74 (BP Location: Left arm, Patient Position: Sitting)   Pulse 69   Temp 97.8 °F (36.6 °C)   Resp 16   Ht 5' 3" (1.6 m)   Wt 78.7 kg (173 lb 9.6 oz)   BMI 30.75 kg/m²      General Appearance: healthy, alert, no distress, smiling; she ambulates with a cane on her right side-this is not a new finding.    Chest:           Lungs: clear to auscultation bilaterally           Heart: regular rate and rhythm, S1, S2 normal, no murmur, click, rub or gallop    Abdomen:  Slight tenderness to deep palpation without rebound pain in the right lower quadrant.  There is no abdominal distention or ascites.    Pelvic: not performed     Extremity: normal    Skin: normal exam        Assessment:   Problem List Items Addressed This Visit          Renal/    OAB (overactive bladder) - Primary (Chronic)          Other Visit Diagnoses       Lower abdominal pain        non compliance-no ultrasound yet; intermittent chronic right lower quadrant pain    Relevant Orders    US Pelvis Complete Non OB    Chronic constipation                 Plan:  Continue on VESIcare 5 mg daily; Dr. Manuel Contreras did give her literature concerning InterStim therapy for overactive bladder issues.  This may help relieve her dry mouth problem.            Rescheduled for abdominal/pelvic ultrasound her unexplained right lower quadrant pain.              Return back for follow-up in 3 months.  "

## 2022-08-11 ENCOUNTER — HOSPITAL ENCOUNTER (OUTPATIENT)
Dept: RADIOLOGY | Facility: HOSPITAL | Age: 72
Discharge: HOME | End: 2022-08-11
Attending: OBSTETRICS & GYNECOLOGY
Payer: MEDICARE

## 2022-08-11 ENCOUNTER — TELEPHONE (OUTPATIENT)
Dept: OBSTETRICS AND GYNECOLOGY | Facility: CLINIC | Age: 72
End: 2022-08-11
Payer: MEDICARE

## 2022-08-11 DIAGNOSIS — R10.30 LOWER ABDOMINAL PAIN: ICD-10-CM

## 2022-08-11 DIAGNOSIS — R10.30 LOWER ABDOMINAL PAIN: Primary | ICD-10-CM

## 2022-08-11 PROCEDURE — 76856 US EXAM PELVIC COMPLETE: CPT | Mod: 26,,, | Performed by: STUDENT IN AN ORGANIZED HEALTH CARE EDUCATION/TRAINING PROGRAM

## 2022-08-11 PROCEDURE — 76705 ECHO EXAM OF ABDOMEN: CPT | Mod: 26,,, | Performed by: STUDENT IN AN ORGANIZED HEALTH CARE EDUCATION/TRAINING PROGRAM

## 2022-08-11 PROCEDURE — 76705 ECHO EXAM OF ABDOMEN: CPT | Mod: TC

## 2022-08-11 PROCEDURE — 76830 TRANSVAGINAL US NON-OB: CPT | Mod: TC

## 2022-08-11 PROCEDURE — 76856 US PELVIS COMP WITH TRANSVAG NON-OB (XPD): ICD-10-PCS | Mod: 26,,, | Performed by: STUDENT IN AN ORGANIZED HEALTH CARE EDUCATION/TRAINING PROGRAM

## 2022-08-11 PROCEDURE — 76830 TRANSVAGINAL US NON-OB: CPT | Mod: 26,,, | Performed by: STUDENT IN AN ORGANIZED HEALTH CARE EDUCATION/TRAINING PROGRAM

## 2022-08-11 PROCEDURE — 76830 US PELVIS COMP WITH TRANSVAG NON-OB (XPD): ICD-10-PCS | Mod: 26,,, | Performed by: STUDENT IN AN ORGANIZED HEALTH CARE EDUCATION/TRAINING PROGRAM

## 2022-08-11 PROCEDURE — 76705 US ABDOMEN LIMITED: ICD-10-PCS | Mod: 26,,, | Performed by: STUDENT IN AN ORGANIZED HEALTH CARE EDUCATION/TRAINING PROGRAM

## 2022-08-11 NOTE — TELEPHONE ENCOUNTER
[facility] at [facility] @ [phone]. Wanted order put into system for gallbladder for doctor to read due to the possible gallstones seen.

## 2022-09-19 PROBLEM — Z00.00 ENCOUNTER FOR INITIAL ANNUAL WELLNESS VISIT (AWV) IN MEDICARE PATIENT: Status: RESOLVED | Noted: 2022-06-14 | Resolved: 2022-09-19

## 2022-09-20 ENCOUNTER — HOSPITAL ENCOUNTER (OUTPATIENT)
Dept: RADIOLOGY | Facility: HOSPITAL | Age: 72
Discharge: HOME | End: 2022-09-20
Attending: OBSTETRICS & GYNECOLOGY
Payer: MEDICARE

## 2022-09-20 DIAGNOSIS — Z12.31 OTHER SCREENING MAMMOGRAM: ICD-10-CM

## 2022-09-20 PROCEDURE — 77067 SCR MAMMO BI INCL CAD: CPT | Mod: TC

## 2022-09-29 ENCOUNTER — OFFICE VISIT (OUTPATIENT)
Dept: NEUROLOGY | Facility: CLINIC | Age: 72
End: 2022-09-29
Payer: MEDICARE

## 2022-09-29 ENCOUNTER — HOSPITAL ENCOUNTER (OUTPATIENT)
Dept: RADIOLOGY | Facility: HOSPITAL | Age: 72
Discharge: HOME | End: 2022-09-29
Attending: NURSE PRACTITIONER
Payer: MEDICARE

## 2022-09-29 VITALS
BODY MASS INDEX: 31.54 KG/M2 | WEIGHT: 178 LBS | DIASTOLIC BLOOD PRESSURE: 86 MMHG | HEART RATE: 61 BPM | OXYGEN SATURATION: 98 % | SYSTOLIC BLOOD PRESSURE: 138 MMHG | HEIGHT: 63 IN

## 2022-09-29 DIAGNOSIS — G56.03 BILATERAL CARPAL TUNNEL SYNDROME: Chronic | ICD-10-CM
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
## 2022-09-29 DIAGNOSIS — M25.561 RIGHT KNEE PAIN, UNSPECIFIED CHRONICITY: ICD-10-CM
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
## 2022-09-29 DIAGNOSIS — M25.551 RIGHT HIP PAIN: ICD-10-CM

## 2022-09-29 DIAGNOSIS — G62.9 POLYNEUROPATHY: Primary | Chronic | ICD-10-CM

## 2022-09-29 DIAGNOSIS — M54.50 LOW BACK PAIN, UNSPECIFIED BACK PAIN LATERALITY, UNSPECIFIED CHRONICITY, UNSPECIFIED WHETHER SCIATICA PRESENT: ICD-10-CM

## 2022-09-29 DIAGNOSIS — G25.81 RLS (RESTLESS LEGS SYNDROME): Chronic | ICD-10-CM

## 2022-09-29 DIAGNOSIS — Z79.899 ON LONG TERM DRUG THERAPY: ICD-10-CM

## 2022-09-29 PROCEDURE — 1160F RVW MEDS BY RX/DR IN RCRD: CPT | Mod: CPTII,,, | Performed by: NURSE PRACTITIONER

## 2022-09-29 PROCEDURE — 73560 X-RAY EXAM OF KNEE 1 OR 2: CPT | Mod: 26,RT,, | Performed by: RADIOLOGY

## 2022-09-29 PROCEDURE — 3075F PR MOST RECENT SYSTOLIC BLOOD PRESS GE 130-139MM HG: ICD-10-PCS | Mod: CPTII,,, | Performed by: NURSE PRACTITIONER

## 2022-09-29 PROCEDURE — 72100 X-RAY EXAM L-S SPINE 2/3 VWS: CPT | Mod: TC

## 2022-09-29 PROCEDURE — 3075F SYST BP GE 130 - 139MM HG: CPT | Mod: CPTII,,, | Performed by: NURSE PRACTITIONER

## 2022-09-29 PROCEDURE — 73502 X-RAY EXAM HIP UNI 2-3 VIEWS: CPT | Mod: TC,RT

## 2022-09-29 PROCEDURE — 1159F PR MEDICATION LIST DOCUMENTED IN MEDICAL RECORD: ICD-10-PCS | Mod: CPTII,,, | Performed by: NURSE PRACTITIONER

## 2022-09-29 PROCEDURE — 3008F BODY MASS INDEX DOCD: CPT | Mod: CPTII,,, | Performed by: NURSE PRACTITIONER

## 2022-09-29 PROCEDURE — 1100F PTFALLS ASSESS-DOCD GE2>/YR: CPT | Mod: CPTII,,, | Performed by: NURSE PRACTITIONER

## 2022-09-29 PROCEDURE — 73560 X-RAY EXAM OF KNEE 1 OR 2: CPT | Mod: TC,RT

## 2022-09-29 PROCEDURE — 72100 X-RAY EXAM L-S SPINE 2/3 VWS: CPT | Mod: 26,,, | Performed by: RADIOLOGY

## 2022-09-29 PROCEDURE — 3079F PR MOST RECENT DIASTOLIC BLOOD PRESSURE 80-89 MM HG: ICD-10-PCS | Mod: CPTII,,, | Performed by: NURSE PRACTITIONER

## 2022-09-29 PROCEDURE — 99214 PR OFFICE/OUTPT VISIT, EST, LEVL IV, 30-39 MIN: ICD-10-PCS | Mod: S$PBB,,, | Performed by: NURSE PRACTITIONER

## 2022-09-29 PROCEDURE — 3008F PR BODY MASS INDEX (BMI) DOCUMENTED: ICD-10-PCS | Mod: CPTII,,, | Performed by: NURSE PRACTITIONER

## 2022-09-29 PROCEDURE — 99215 OFFICE O/P EST HI 40 MIN: CPT | Mod: PBBFAC | Performed by: NURSE PRACTITIONER

## 2022-09-29 PROCEDURE — 73560 XR KNEE 1 OR 2 VIEW RIGHT: ICD-10-PCS | Mod: 26,RT,, | Performed by: RADIOLOGY

## 2022-09-29 PROCEDURE — 73502 X-RAY EXAM HIP UNI 2-3 VIEWS: CPT | Mod: 26,RT,, | Performed by: RADIOLOGY

## 2022-09-29 PROCEDURE — 1160F PR REVIEW ALL MEDS BY PRESCRIBER/CLIN PHARMACIST DOCUMENTED: ICD-10-PCS | Mod: CPTII,,, | Performed by: NURSE PRACTITIONER

## 2022-09-29 PROCEDURE — 3288F PR FALLS RISK ASSESSMENT DOCUMENTED: ICD-10-PCS | Mod: CPTII,,, | Performed by: NURSE PRACTITIONER

## 2022-09-29 PROCEDURE — 1159F MED LIST DOCD IN RCRD: CPT | Mod: CPTII,,, | Performed by: NURSE PRACTITIONER

## 2022-09-29 PROCEDURE — 73502 XR HIP WITH PELVIS WHEN PERFORMED, 2 OR 3  VIEWS RIGHT: ICD-10-PCS | Mod: 26,RT,, | Performed by: RADIOLOGY

## 2022-09-29 PROCEDURE — 3288F FALL RISK ASSESSMENT DOCD: CPT | Mod: CPTII,,, | Performed by: NURSE PRACTITIONER

## 2022-09-29 PROCEDURE — 3079F DIAST BP 80-89 MM HG: CPT | Mod: CPTII,,, | Performed by: NURSE PRACTITIONER

## 2022-09-29 PROCEDURE — 1100F PR PT FALLS ASSESS DOC 2+ FALLS/FALL W/INJURY/YR: ICD-10-PCS | Mod: CPTII,,, | Performed by: NURSE PRACTITIONER

## 2022-09-29 PROCEDURE — 99214 OFFICE O/P EST MOD 30 MIN: CPT | Mod: S$PBB,,, | Performed by: NURSE PRACTITIONER

## 2022-09-29 PROCEDURE — 72100 XR LUMBAR SPINE AP AND LATERAL: ICD-10-PCS | Mod: 26,,, | Performed by: RADIOLOGY

## 2022-09-29 RX ORDER — DULOXETIN HYDROCHLORIDE 30 MG/1
CAPSULE, DELAYED RELEASE ORAL
Qty: 30 CAPSULE | Refills: 3 | Status: SHIPPED | OUTPATIENT
Start: 2022-09-29 | End: 2022-11-21 | Stop reason: SINTOL

## 2022-09-29 NOTE — PROGRESS NOTES
Subjective:       Patient ID: Chrissie Cunningham is a 71 y.o. female.    Chief Complaint:  No chief complaint on file.      History of Present Illness  This pleasant 71 year old right handed  female presents to clinic with son for follow up of neuropathy, frequent falls, bilateral CTS and RLS. Patient reports worsening gait and balance difficulty.  Patient states she had 4 to 5  falls in the last month two months. She reported at the last visit that she had 3 falls in May 2022. She denies any head injury and did not seek any medical attention for the falls. She does report low back pain, right hip pain, and right knee pain. Discussed getting xray's, physical therapy and fall precautions in detail at the last visit and again today. She has since completed the physical therapy and states it did not help.  She is pleased today with the treatment of the CTS and RLS and we will continue the current management for now. Discussed at the last visit tapering off the Lyrica and Lexapro and she is no longer on these medications. She was started on Cymbalta 30 mg at bedtime for the Neuropathy and Depression. She states she is tolerating this medication and it is helping some. Patient states neuropathy started 2 years ago with burning, numbness, and tingling that she rates today as a 4 on a scale of 0-5.  Symptoms are located in the bilateral legs, feet, and bilateral hands that is worse in the legs and feet. Symptoms are intermittent with pain rated as a 5 on a scale of 0-10 and described as a stinging pain that is worse during the daytime.  Precipitating factors are prolonged sitting, activity and walking. She denies diabetes, back injury or neck injury. She  has chronic neck pain from neck surgery in 2013. She has been evaluated by vascular surgeon Dr. EVER Hernandez in the past.  Arterial doppler with exercise done on September 22, 2020 showed no significant disease when compared to the resting study done in August 2020.  Venous doppler done on August 5, 2020 was negative for DVT. Recent EMG NCS supports CTS but was negative in the legs. She was referred to orthopedics at the last visit to evaluate and treat the CTS. She reports having trigger finger surgery and the CTS is greatly improved. She desires to see orthopedics for the knee and hip pain. She also reports symptoms of RLS with cramps and the urge to move her legs that is relieved with movement but then returns. She states her PCP has her on Mirapex for the RLS. She denies smoking or drinking alcohol and had a total hysterectomy in 2012. Discussed getting the MRI of the brain and cervical spine to further evaluate the falls. If these are negative then we will get the MRI of the thoracic and lumbar spine. IF the workup is negative then we will refer her to a movement disorder specialist. They are in agreement with this.  Discussed the plan in detail with Neurologist Dr. DORIS Pack, the patient and son and they are in agreement with the plan. All her questions were answered at today's visit.       Past Neuropathy medications: Gabapentin, Lyrica      EMG NCS of all 4 extremities done on July 12, 2021 at the [facility] by Dr. KEIRY Olguin showed predominantly sensory peripheral neuropathy. Bilateral median nerve entrapment neuropathy at the carpal tunnel, left greater than right. Normal EMG of the bilateral lower extremities with no electrophysiological evidence of denervation or neuropathic changes.            Review of Systems  Review of Systems   Constitutional:  Negative for activity change, diaphoresis, fever and unexpected weight change.   HENT:  Negative for congestion, ear pain, facial swelling, hearing loss, tinnitus, trouble swallowing and voice change.    Eyes:  Negative for photophobia, pain and visual disturbance.   Respiratory:  Negative for chest tightness, shortness of breath and wheezing.    Cardiovascular:  Negative for chest pain,  palpitations and leg swelling.   Gastrointestinal:  Negative for constipation, diarrhea, nausea and vomiting.   Genitourinary:  Negative for difficulty urinating.   Musculoskeletal:  Positive for back pain and gait problem. Negative for neck pain and neck stiffness.   Skin:  Negative for color change, pallor, rash and wound.   Neurological:  Positive for numbness. Negative for dizziness, tremors, seizures, syncope, facial asymmetry, speech difficulty, weakness, light-headedness and headaches.        RLS        CTS    Psychiatric/Behavioral:  Negative for agitation, behavioral problems, confusion, decreased concentration and hallucinations. The patient is not nervous/anxious and is not hyperactive.      Objective:      Neurologic Exam     Mental Status   Oriented to person, place, and time.   Oriented to person.   Oriented to place.   Oriented to time.   Attention: normal. Concentration: normal.   Speech: speech is normal   Level of consciousness: alert  Knowledge: good.     Cranial Nerves     CN II   Visual fields full to confrontation.     CN III, IV, VI   Pupils are equal, round, and reactive to light.  Extraocular motions are normal.   Right pupil: Size: 3 mm. Shape: regular. Reactivity: brisk.   Left pupil: Size: 3 mm. Shape: regular. Reactivity: brisk.   CN III: no CN III palsy  CN VI: no CN VI palsy    CN V   Facial sensation intact.     CN VII   Facial expression full, symmetric.     CN VIII   CN VIII normal.   Hearing: intact    CN IX, X   CN IX normal.   CN X normal.     CN XI   CN XI normal.     CN XII   CN XII normal.     Motor Exam   Muscle bulk: normal  Overall muscle tone: normal  Right arm pronator drift: absent  Left arm pronator drift: absent    Strength   Right deltoid: 5/5  Left deltoid: 5/5  Right biceps: 5/5  Left biceps: 5/5  Right triceps: 5/5  Left triceps: 5/5  Right quadriceps: 4/5  Left quadriceps: 5/5  Right hamstrin/5  Left hamstrin/5    Sensory Exam   Light touch normal.      Gait, Coordination, and Reflexes     Coordination   Romberg: negative  Finger to nose coordination: normal  Heel to shin coordination: normal  Tandem walking coordination: normal    Tremor   Resting tremor: absent  Intention tremor: absent  Action tremor: absent    Reflexes   Right brachioradialis: 2+  Left brachioradialis: 2+  Right biceps: 2+  Left biceps: 2+  Right triceps: 2+  Left triceps: 2+  Right patellar: 2+  Left patellar: 2+  Right achilles: 2+  Left achilles: 2+  Right : 4+  Left : 4+  Ambulates with a cane     Physical Exam  Constitutional:       General: She is not in acute distress.  HENT:      Head: Normocephalic.      Nose: Nose normal.      Mouth/Throat:      Mouth: Mucous membranes are moist.   Eyes:      Extraocular Movements: Extraocular movements intact and EOM normal.      Pupils: Pupils are equal, round, and reactive to light.   Cardiovascular:      Rate and Rhythm: Normal rate and regular rhythm.      Heart sounds: Normal heart sounds. No murmur heard.  Pulmonary:      Effort: Pulmonary effort is normal. No respiratory distress.      Breath sounds: Normal breath sounds. No wheezing, rhonchi or rales.   Musculoskeletal:         General: No swelling, tenderness, deformity or signs of injury. Normal range of motion.      Cervical back: Normal range of motion. No rigidity or tenderness.      Right lower leg: No edema.      Left lower leg: No edema.   Skin:     General: Skin is warm and dry.      Capillary Refill: Capillary refill takes less than 2 seconds.      Coloration: Skin is not jaundiced or pale.      Findings: No bruising, erythema, lesion or rash.   Neurological:      Mental Status: She is alert and oriented to person, place, and time.      Coordination: Finger-Nose-Finger Test, Heel to Shin Test and Romberg Test normal.      Gait: Tandem walk normal.      Deep Tendon Reflexes:      Reflex Scores:       Tricep reflexes are 2+ on the right side and 2+ on the left side.        Bicep reflexes are 2+ on the right side and 2+ on the left side.       Brachioradialis reflexes are 2+ on the right side and 2+ on the left side.       Patellar reflexes are 2+ on the right side and 2+ on the left side.       Achilles reflexes are 2+ on the right side and 2+ on the left side.  Psychiatric:         Mood and Affect: Mood normal.         Speech: Speech normal.         Behavior: Behavior normal.         Assessment:     Problem List Items Addressed This Visit          Neuro    Polyneuropathy - Primary (Chronic)    Relevant Medications    DULoxetine (CYMBALTA) 30 MG capsule    RLS (restless legs syndrome) (Chronic)    Bilateral carpal tunnel syndrome (Chronic)    Relevant Medications    DULoxetine (CYMBALTA) 30 MG capsule       Orthopedic    Fall    Relevant Orders    MRI Brain W WO Contrast    MRI Cervical Spine W WO Cont    Creatinine, serum       Palliative Care    On long term drug therapy      Other Visit Diagnoses       Right hip pain        Relevant Orders    Ambulatory referral/consult to Orthopedics    Right knee pain, unspecified chronicity        Relevant Orders    Ambulatory referral/consult to Orthopedics              Plan:       Get Xray lumbar spine, right hip and right knee ordered at the last visit  2.   Open at [facility] MRI of the brain with and without contrast to evaluate falls  3.   Open at [facility] MRI of the cervical spine with and without contrast to evaluate falls   4.   Renew and continue Cymbalta 30 mg one at bedtime,   5.   Use walker or 4 prong cane for better balance  6.   wear wrist splints at night  7.   Fall precautions  8.   Regular follow up with PCP for medical management  9.   IF mri's are negative then mri of thoracic and lumbar spine  10. IF work up is negative the referral to movement disorder specialist  11. Referral to orthopedics for bilateral knee and right hip pain  12. Follow up with neurology in 6 weeks or sooner if needed

## 2022-09-29 NOTE — PATIENT INSTRUCTIONS
Get Xray lumbar spine, right hip and right knee ordered at the last visit  2.   Open at [facility] MRI of the brain with and without contrast to evaluate falls  3.   Open at [facility] MRI of the cervical spine with and without contrast to evaluate falls   4.   Renew and continue Cymbalta 30 mg one at bedtime,   5.   Use walker or 4 prong cane for better balance  6.   wear wrist splints at night  7.   Fall precautions  8.   Regular follow up with PCP for medical management  9.   IF mri's are negative then mri of thoracic and lumbar spine  10. IF work up is negative then referral to movement disorder specialist  11. Referral to orthopedics for bilateral knee and right hip pain  12. Follow up with neurology in 6 weeks or sooner if needed

## 2022-10-04 ENCOUNTER — TELEPHONE (OUTPATIENT)
Dept: NEUROLOGY | Facility: CLINIC | Age: 72
End: 2022-10-04
Payer: MEDICARE

## 2022-10-04 NOTE — TELEPHONE ENCOUNTER
Called and informed patient that XR of the right knee showed spurring of the superior and inferior poles of the patella. Mild loss of joint space within medial and patellofemoral compartments No fracture or dislocation. Instructed to keep appointment with orthopedics. Questions answered to patient's satisfaction and voiced understanding by verbal return. She will relay message to her son.              ----- Message from Chidi Valenzuela NP sent at 9/30/2022  4:40 PM CDT -----  Please let patient and son know that the xray of the knee showed Spurring of the superior and inferior poles of patella.  Mild loss of joint space within the medial and patellofemoral compartments.  No acute fracture or dislocation. Keep appointment with orthopedics. Thanks

## 2022-10-04 NOTE — TELEPHONE ENCOUNTER
Called pt and informed her that the XR of the hip showed mild degenerative change. Acetabular over coverage is suggested bilaterally which has been associated with femoroarticular impingement. No acute fracture or dislocation. Instructed to keep follow up with orthopedics. Questions answered to patient's satisfaction and voiced understanding by verbal return. Pt will relay message to son.

## 2022-10-04 NOTE — TELEPHONE ENCOUNTER
Called and informed pt that XR of the lumbar spine showed degenerative disc disease and Grade 1 spondylolisthesis at L4-5. No acute process. Questions answered to patient's satisfaction. Pt voiced understanding by verbal return and will relay the message to her son.              ----- Message from Chidi Valenzuela NP sent at 9/30/2022  4:38 PM CDT -----  Please let patient and son know that the xray of the lumbar showed No acute process  Degenerative disc disease with Grade 1 spondylolisthesis L4-L5. The xray of the hip showed Mild degenerative change of the hips.  Acetabular over-coverage is suggested bilaterally which has been associated with femoroacetabular impingement. No acute fracture or dislocation.  Tell them to keep their appointment with orthopedics thanks

## 2022-10-10 ENCOUNTER — OFFICE VISIT (OUTPATIENT)
Dept: FAMILY MEDICINE | Facility: CLINIC | Age: 72
End: 2022-10-10
Payer: MEDICARE

## 2022-10-10 VITALS
BODY MASS INDEX: 31.78 KG/M2 | SYSTOLIC BLOOD PRESSURE: 122 MMHG | RESPIRATION RATE: 18 BRPM | WEIGHT: 179.38 LBS | OXYGEN SATURATION: 97 % | HEART RATE: 62 BPM | DIASTOLIC BLOOD PRESSURE: 74 MMHG

## 2022-10-10 DIAGNOSIS — G62.9 NEUROPATHY: ICD-10-CM

## 2022-10-10 DIAGNOSIS — Z91.81 RISK FOR FALLS: ICD-10-CM

## 2022-10-10 DIAGNOSIS — M17.11 OSTEOARTHRITIS OF RIGHT KNEE, UNSPECIFIED OSTEOARTHRITIS TYPE: Primary | ICD-10-CM

## 2022-10-10 DIAGNOSIS — M16.0 OSTEOARTHRITIS OF BOTH HIPS, UNSPECIFIED OSTEOARTHRITIS TYPE: ICD-10-CM

## 2022-10-10 PROCEDURE — 3078F DIAST BP <80 MM HG: CPT | Mod: ,,, | Performed by: INTERNAL MEDICINE

## 2022-10-10 PROCEDURE — 99214 OFFICE O/P EST MOD 30 MIN: CPT | Mod: 25,,, | Performed by: INTERNAL MEDICINE

## 2022-10-10 PROCEDURE — 3288F FALL RISK ASSESSMENT DOCD: CPT | Mod: ,,, | Performed by: INTERNAL MEDICINE

## 2022-10-10 PROCEDURE — 3288F PR FALLS RISK ASSESSMENT DOCUMENTED: ICD-10-PCS | Mod: ,,, | Performed by: INTERNAL MEDICINE

## 2022-10-10 PROCEDURE — 1125F AMNT PAIN NOTED PAIN PRSNT: CPT | Mod: ,,, | Performed by: INTERNAL MEDICINE

## 2022-10-10 PROCEDURE — 3078F PR MOST RECENT DIASTOLIC BLOOD PRESSURE < 80 MM HG: ICD-10-PCS | Mod: ,,, | Performed by: INTERNAL MEDICINE

## 2022-10-10 PROCEDURE — 1125F PR PAIN SEVERITY QUANTIFIED, PAIN PRESENT: ICD-10-PCS | Mod: ,,, | Performed by: INTERNAL MEDICINE

## 2022-10-10 PROCEDURE — 1100F PR PT FALLS ASSESS DOC 2+ FALLS/FALL W/INJURY/YR: ICD-10-PCS | Mod: ,,, | Performed by: INTERNAL MEDICINE

## 2022-10-10 PROCEDURE — 3008F PR BODY MASS INDEX (BMI) DOCUMENTED: ICD-10-PCS | Mod: ,,, | Performed by: INTERNAL MEDICINE

## 2022-10-10 PROCEDURE — 3074F SYST BP LT 130 MM HG: CPT | Mod: ,,, | Performed by: INTERNAL MEDICINE

## 2022-10-10 PROCEDURE — 1100F PTFALLS ASSESS-DOCD GE2>/YR: CPT | Mod: ,,, | Performed by: INTERNAL MEDICINE

## 2022-10-10 PROCEDURE — 1159F PR MEDICATION LIST DOCUMENTED IN MEDICAL RECORD: ICD-10-PCS | Mod: ,,, | Performed by: INTERNAL MEDICINE

## 2022-10-10 PROCEDURE — 96372 THER/PROPH/DIAG INJ SC/IM: CPT | Mod: ,,, | Performed by: INTERNAL MEDICINE

## 2022-10-10 PROCEDURE — 3074F PR MOST RECENT SYSTOLIC BLOOD PRESSURE < 130 MM HG: ICD-10-PCS | Mod: ,,, | Performed by: INTERNAL MEDICINE

## 2022-10-10 PROCEDURE — 96372 PR INJECTION,THERAP/PROPH/DIAG2ST, IM OR SUBCUT: ICD-10-PCS | Mod: ,,, | Performed by: INTERNAL MEDICINE

## 2022-10-10 PROCEDURE — 3008F BODY MASS INDEX DOCD: CPT | Mod: ,,, | Performed by: INTERNAL MEDICINE

## 2022-10-10 PROCEDURE — 1159F MED LIST DOCD IN RCRD: CPT | Mod: ,,, | Performed by: INTERNAL MEDICINE

## 2022-10-10 PROCEDURE — 1160F PR REVIEW ALL MEDS BY PRESCRIBER/CLIN PHARMACIST DOCUMENTED: ICD-10-PCS | Mod: ,,, | Performed by: INTERNAL MEDICINE

## 2022-10-10 PROCEDURE — 99214 PR OFFICE/OUTPT VISIT, EST, LEVL IV, 30-39 MIN: ICD-10-PCS | Mod: 25,,, | Performed by: INTERNAL MEDICINE

## 2022-10-10 PROCEDURE — 1160F RVW MEDS BY RX/DR IN RCRD: CPT | Mod: ,,, | Performed by: INTERNAL MEDICINE

## 2022-10-10 RX ORDER — CYCLOBENZAPRINE HCL 10 MG
10 TABLET ORAL 3 TIMES DAILY PRN
Qty: 30 TABLET | Refills: 0 | Status: SHIPPED | OUTPATIENT
Start: 2022-10-10 | End: 2023-03-14 | Stop reason: SDUPTHER

## 2022-10-10 RX ORDER — GABAPENTIN 300 MG/1
300 CAPSULE ORAL 2 TIMES DAILY PRN
Qty: 60 CAPSULE | Refills: 3 | Status: SHIPPED | OUTPATIENT
Start: 2022-10-10 | End: 2023-03-14 | Stop reason: SDUPTHER

## 2022-10-10 RX ORDER — KETOROLAC TROMETHAMINE 30 MG/ML
10 INJECTION, SOLUTION INTRAMUSCULAR; INTRAVENOUS
Status: COMPLETED | OUTPATIENT
Start: 2022-10-10 | End: 2022-10-10

## 2022-10-10 RX ADMIN — KETOROLAC TROMETHAMINE 9.9 MG: 30 INJECTION, SOLUTION INTRAMUSCULAR; INTRAVENOUS at 11:10

## 2022-10-10 NOTE — PATIENT INSTRUCTIONS
Chrissie was seen today for medication refill, leg pain, hip pain and hypertension.    Diagnoses and all orders for this visit:    Osteoarthritis of right knee, unspecified osteoarthritis type  -     ketorolac injection 9.9 mg    Osteoarthritis of both hips, unspecified osteoarthritis type  -     ketorolac injection 9.9 mg    Risk for falls    Neuropathy    Other orders  -     cyclobenzaprine (FLEXERIL) 10 MG tablet; Take 1 tablet (10 mg total) by mouth 3 (three) times daily as needed for Muscle spasms.  -     gabapentin (NEURONTIN) 300 MG capsule; Take 1 capsule (300 mg total) by mouth 2 (two) times daily as needed.

## 2022-10-10 NOTE — PROGRESS NOTES
Subjective:       Patient ID: Chrissie Cunningham is a 71 y.o. female.    Chief Complaint: Medication Refill, Leg Pain (right), Hip Pain (right), and Hypertension    Patient is here today for check up evaluation. Patient is complaining of bilateral leg pain.  feels tingling in both legs and has weakness of the right leg.  cannot stand along and if she does not use her walker to help ambulate then she will fall down.  has PT at home last month but it was discontinued due to it not helping her. Recent xrays from June show osteoarthritis. States motrin was helping but was causing stomach problems. States gabapentin helped but need refills. Will refill gabapentin 300 mg PO BID prn. Will also give Toradol 10 mg IM now. Will follow in 6 weeks.     Current Medications:    Current Outpatient Medications:     acyclovir (ZOVIRAX) 400 MG tablet, Take 1 tablet (400 mg total) by mouth 2 (two) times daily., Disp: 180 tablet, Rfl: 3    alendronate (FOSAMAX) 70 MG tablet, Take 1 tablet (70 mg total) by mouth every 7 days., Disp: 12 tablet, Rfl: 3    atorvastatin (LIPITOR) 20 MG tablet, , Disp: , Rfl:     diclofenac (CATAFLAM) 50 MG tablet, Take 1 tablet by mouth 4 times daily, Disp: 120 tablet, Rfl: 0    diphenhydramine HCl (BENADRYL ALLERGY ORAL), Take by mouth., Disp: , Rfl:     DULoxetine (CYMBALTA) 30 MG capsule, Take one capsule at bedtime, Disp: 30 capsule, Rfl: 3    estradioL (ESTRACE) 0.01 % (0.1 mg/gram) vaginal cream, Place 1 g vaginally twice a week., Disp: 1 each, Rfl: 2    HYDROcodone-acetaminophen (NORCO) 5-325 mg per tablet, Take 1 tablet by mouth every 4 (four) hours as needed for Pain., Disp: 12 tablet, Rfl: 0    hydrOXYzine HCL (ATARAX) 25 MG tablet, Take 1 tablet (25 mg total) by mouth daily as needed for Itching., Disp: 15 tablet, Rfl: 0    NIFEdipine (ADALAT CC) 60 MG TbSR, Take 1 tablet (60 mg total) by mouth once daily., Disp: 90 tablet, Rfl: 1    omeprazole (PRILOSEC) 40 MG capsule, Take 1 capsule  (40 mg total) by mouth once daily., Disp: 90 capsule, Rfl: 1    pramipexole (MIRAPEX) 0.25 MG tablet, Take 1 tablet by mouth once daily., Disp: , Rfl:     solifenacin (VESICARE) 5 MG tablet, Take 1 tablet (5 mg total) by mouth once daily., Disp: 90 tablet, Rfl: 3    cyclobenzaprine (FLEXERIL) 10 MG tablet, Take 1 tablet (10 mg total) by mouth 3 (three) times daily as needed for Muscle spasms., Disp: 30 tablet, Rfl: 0    DICLOFENAC POTASSIUM ORAL, Take 50 mg by mouth 4 (four) times daily., Disp: , Rfl:     gabapentin (NEURONTIN) 300 MG capsule, Take 1 capsule (300 mg total) by mouth 2 (two) times daily as needed., Disp: 60 capsule, Rfl: 3  No current facility-administered medications for this visit.    Last Labs:     No visits with results within 1 Month(s) from this visit.   Latest known visit with results is:   Lab Visit on 06/29/2022   Component Date Value    Vitamin B12 06/29/2022 1,084 (H)     Folate 06/29/2022 19.9 (H)     Total Protein 06/29/2022 7.6     Albumin, PE 06/29/2022 4.80     Fbnwn-[id number] 06/29/2022 0.2     Alpha-2-Globulin 06/29/2022 0.7     Beta, PE 06/29/2022 1.0     Gamma, PE 06/29/2022 0.9     Pathologist Interp, Pro * 06/29/2022 Normal. No monoclonal bands identified     [facility] Screen 06/29/2022 Negative     Syphilis Ab Interpretati* 06/29/2022 Non-Reactive        Last Imaging:  X-Ray Lumbar Spine AP And Lateral  Narrative: EXAMINATION:  XR LUMBAR SPINE AP AND LATERAL    CLINICAL HISTORY:  .  Unspecified fall, initial encounter    COMPARISON:  No similar currently available    TECHNIQUE:  Lumbar spine AP and lateral    FINDINGS:  There is slight gentle sloping dextroscoliosis centered at the L2-L3 level.  There is some mild grade 1 anterolisthesis of L4 on L5.  There is no malalignment otherwise.    There is mild degenerative disc narrowing at L3-L4 and L4-L5.  There is mild lumbar spondylosis.  There is moderate hypertrophic change of the facet joints at the lumbar level.    Osteopenia.    No  focal lytic or blastic lesion is seen  Impression: No acute process    Degenerative disc disease    Grade 1 spondylolisthesis L4-L5    Electronically signed by: Albino Hernandez  Date:    09/29/2022  Time:    11:45  X-Ray Hip 2 or 3 views Right (with Pelvis when performed)  Narrative: EXAMINATION:  XR HIP WITH PELVIS WHEN PERFORMED, 2 OR 3  VIEWS RIGHT    CLINICAL HISTORY:  Unspecified fall, initial encounterright hip pain post fall;    COMPARISON:  Left hip x-ray February 1, 2016    TECHNIQUE:  Single frontal view of the pelvis as well as frontal and frogleg lateral views of the right hip.    FINDINGS:  Mild degenerative change of the hips.  Acetabular over-coverage is suggested bilaterally which has been associated with femoroacetabular impingement. No acute fracture or dislocation.  Impression: As above.    Point of Service: [facility]    Electronically signed by: Segundo Mcdermott  Date:    09/29/2022  Time:    11:32  X-Ray Knee 1 or 2 View Right  Narrative: EXAMINATION:  XR KNEE 1 OR 2 VIEW RIGHT    CLINICAL HISTORY:  Unspecified fall, initial encounterright knee pain post fall;    COMPARISON:  No relevant comparison    TECHNIQUE:  Frontal and lateral views of the right knee.    FINDINGS:  Spurring of the superior and inferior poles of patella.  Mild loss of joint space within the medial and patellofemoral compartments.  No acute fracture or dislocation.  Impression: As above.    Point of Service: [facility]    Electronically signed by: Segundo Mcdermott  Date:    09/29/2022  Time:    11:17         Review of Systems   Musculoskeletal:  Positive for arthralgias.   All other systems reviewed and are negative.      Objective:      Physical Exam  Vitals reviewed.   Constitutional:       Appearance: Normal appearance.   Cardiovascular:      Rate and Rhythm: Normal rate and regular rhythm.      Pulses: Normal pulses.      Heart sounds: Normal heart sounds.   Pulmonary:      Effort: Pulmonary effort  is normal.      Breath sounds: Normal breath sounds.   Abdominal:      General: Abdomen is flat. Bowel sounds are normal.      Palpations: Abdomen is soft.   Musculoskeletal:         General: Normal range of motion.      Cervical back: Normal range of motion and neck supple.   Skin:     General: Skin is warm and dry.   Neurological:      General: No focal deficit present.      Mental Status: She is alert and oriented to person, place, and time. Mental status is at baseline.       Assessment:       1. Osteoarthritis of right knee, unspecified osteoarthritis type  ketorolac injection 9.9 mg      2. Osteoarthritis of both hips, unspecified osteoarthritis type  ketorolac injection 9.9 mg      3. Risk for falls        4. Neuropathy             Plan:         Chrissie was seen today for medication refill, leg pain, hip pain and hypertension.    Diagnoses and all orders for this visit:    Osteoarthritis of right knee, unspecified osteoarthritis type  -     ketorolac injection 9.9 mg    Osteoarthritis of both hips, unspecified osteoarthritis type  -     ketorolac injection 9.9 mg    Risk for falls    Neuropathy    Other orders  -     cyclobenzaprine (FLEXERIL) 10 MG tablet; Take 1 tablet (10 mg total) by mouth 3 (three) times daily as needed for Muscle spasms.  -     gabapentin (NEURONTIN) 300 MG capsule; Take 1 capsule (300 mg total) by mouth 2 (two) times daily as needed.

## 2022-10-19 ENCOUNTER — OFFICE VISIT (OUTPATIENT)
Dept: CARDIOLOGY | Facility: CLINIC | Age: 72
End: 2022-10-19
Payer: MEDICARE

## 2022-10-19 VITALS
DIASTOLIC BLOOD PRESSURE: 76 MMHG | OXYGEN SATURATION: 99 % | HEIGHT: 63 IN | BODY MASS INDEX: 31.18 KG/M2 | WEIGHT: 176 LBS | HEART RATE: 82 BPM | SYSTOLIC BLOOD PRESSURE: 130 MMHG

## 2022-10-19 DIAGNOSIS — I10 HYPERTENSION, UNSPECIFIED TYPE: Primary | ICD-10-CM

## 2022-10-19 PROCEDURE — 3075F PR MOST RECENT SYSTOLIC BLOOD PRESS GE 130-139MM HG: ICD-10-PCS | Mod: CPTII,,, | Performed by: NURSE PRACTITIONER

## 2022-10-19 PROCEDURE — 99214 PR OFFICE/OUTPT VISIT, EST, LEVL IV, 30-39 MIN: ICD-10-PCS | Mod: S$PBB,,, | Performed by: NURSE PRACTITIONER

## 2022-10-19 PROCEDURE — 3078F DIAST BP <80 MM HG: CPT | Mod: CPTII,,, | Performed by: NURSE PRACTITIONER

## 2022-10-19 PROCEDURE — 1159F PR MEDICATION LIST DOCUMENTED IN MEDICAL RECORD: ICD-10-PCS | Mod: CPTII,,, | Performed by: NURSE PRACTITIONER

## 2022-10-19 PROCEDURE — 1159F MED LIST DOCD IN RCRD: CPT | Mod: CPTII,,, | Performed by: NURSE PRACTITIONER

## 2022-10-19 PROCEDURE — 1160F RVW MEDS BY RX/DR IN RCRD: CPT | Mod: CPTII,,, | Performed by: NURSE PRACTITIONER

## 2022-10-19 PROCEDURE — 3075F SYST BP GE 130 - 139MM HG: CPT | Mod: CPTII,,, | Performed by: NURSE PRACTITIONER

## 2022-10-19 PROCEDURE — 1160F PR REVIEW ALL MEDS BY PRESCRIBER/CLIN PHARMACIST DOCUMENTED: ICD-10-PCS | Mod: CPTII,,, | Performed by: NURSE PRACTITIONER

## 2022-10-19 PROCEDURE — 99214 OFFICE O/P EST MOD 30 MIN: CPT | Mod: PBBFAC | Performed by: NURSE PRACTITIONER

## 2022-10-19 PROCEDURE — 93010 EKG 12-LEAD: ICD-10-PCS | Mod: S$PBB,,, | Performed by: INTERNAL MEDICINE

## 2022-10-19 PROCEDURE — 93010 ELECTROCARDIOGRAM REPORT: CPT | Mod: S$PBB,,, | Performed by: INTERNAL MEDICINE

## 2022-10-19 PROCEDURE — 99214 OFFICE O/P EST MOD 30 MIN: CPT | Mod: S$PBB,,, | Performed by: NURSE PRACTITIONER

## 2022-10-19 PROCEDURE — 3078F PR MOST RECENT DIASTOLIC BLOOD PRESSURE < 80 MM HG: ICD-10-PCS | Mod: CPTII,,, | Performed by: NURSE PRACTITIONER

## 2022-10-19 PROCEDURE — 93005 ELECTROCARDIOGRAM TRACING: CPT | Mod: PBBFAC | Performed by: INTERNAL MEDICINE

## 2022-10-19 NOTE — PROGRESS NOTES
[facility] note        DATE OF SERVICE: 10/19/2022       PCP: Manuel Contreras MD      CHIEF COMPLAINT:   Chief Complaint   Patient presents with    Follow-up     Patient denies any cardiac complaints today.        HISTORY OF PRESENT ILLNESS:  Chrissie Cunningham is a 71 y.o. female with a PMH of   Past Medical History:   Diagnosis Date    Arthritis     Benign lipomatous neoplasm of skin and subcutaneous tissue of other sites 08/08/2007    Left upper back lipoma - large - resected 11/2006 at [facility] by Dr. Ranjit CASTRO (bacterial vaginosis) 09/28/2011    Cataract     Chronic fibrocystic breast disease (FCBD) in female 08/08/2007    chronic bilaterally    GERD (gastroesophageal reflux disease)     Hypertension     Keloid of skin 01/09/2020    probably from scratching;  left and right side of mons pubis    Neuropathy     OAB (overactive bladder) 06/29/2017    by Urodynamics Study    Osteopenia 02/26/2013    as noted on 10/2011    Spinal stenosis 10/08/2013    on the left on neck x-ray    Spondylosis 10/08/2013    Stress incontinence, female 06/29/2017    by Urodynamics Study    Urinary incontinence, mixed 06/29/2017    by Urodynamics Study     who presents for routine follow up.   Chief Complaint   Patient presents with    Follow-up     Patient denies any cardiac complaints today.            PAST MEDICAL HISTORY:  Past Medical History:   Diagnosis Date    Arthritis     Benign lipomatous neoplasm of skin and subcutaneous tissue of other sites 08/08/2007    Left upper back lipoma - large - resected 11/2006 at [facility] by Dr. Ranjit CASTRO (bacterial vaginosis) 09/28/2011    Cataract     Chronic fibrocystic breast disease (FCBD) in female 08/08/2007    chronic bilaterally    GERD (gastroesophageal reflux disease)     Hypertension     Keloid of skin 01/09/2020    probably from scratching;  left and right side of mons pubis    Neuropathy     OAB (overactive bladder) 06/29/2017    by Urodynamics Study     Osteopenia 2013    as noted on 10/2011    Spinal stenosis 10/08/2013    on the left on neck x-ray    Spondylosis 10/08/2013    Stress incontinence, female 2017    by Urodynamics Study    Urinary incontinence, mixed 2017    by Urodynamics Study       PAST SURGICAL HISTORY:  Past Surgical History:   Procedure Laterality Date    BREAST BIOPSY       SECTION      CYST REMOVAL      EYE SURGERY      HYSTERECTOMY      OOPHORECTOMY      TRIGGER FINGER RELEASE Right 2022    Procedure: RELEASE, TRIGGER FINGER, THUMB;  Surgeon: Jayant Mandujano III, MD;  Location: [facility];  Service: Orthopedics;  Laterality: Right;       SOCIAL HISTORY:  Social History     Socioeconomic History    Marital status:    Tobacco Use    Smoking status: Never    Smokeless tobacco: Never   Substance and Sexual Activity    Alcohol use: Never    Drug use: Never    Sexual activity: Not Currently       FAMILY HISTORY:  Family History   Problem Relation Age of Onset    Prostate cancer Father     Diabetes Father     Hypertension Father     Hypertension Mother     Breast cancer Sister     Breast cancer Sister     Hyperlipidemia Neg Hx     Stroke Neg Hx          ALLERGIES:  Review of patient's allergies indicates:   Allergen Reactions    Fish containing products     Hydrocodone-acetaminophen      Other reaction(s): Unknown    Penicillins         MEDICATIONS:    Current Outpatient Medications:     acyclovir (ZOVIRAX) 400 MG tablet, Take 1 tablet (400 mg total) by mouth 2 (two) times daily., Disp: 180 tablet, Rfl: 3    alendronate (FOSAMAX) 70 MG tablet, Take 1 tablet (70 mg total) by mouth every 7 days., Disp: 12 tablet, Rfl: 3    atorvastatin (LIPITOR) 20 MG tablet, , Disp: , Rfl:     cyclobenzaprine (FLEXERIL) 10 MG tablet, Take 1 tablet (10 mg total) by mouth 3 (three) times daily as needed for Muscle spasms., Disp: 30 tablet, Rfl: 0    diclofenac (CATAFLAM) 50 MG tablet, Take 1 tablet by mouth 4 times daily,  "Disp: 120 tablet, Rfl: 0    DICLOFENAC POTASSIUM ORAL, Take 50 mg by mouth 4 (four) times daily., Disp: , Rfl:     diphenhydramine HCl (BENADRYL ALLERGY ORAL), Take by mouth., Disp: , Rfl:     DULoxetine (CYMBALTA) 30 MG capsule, Take one capsule at bedtime, Disp: 30 capsule, Rfl: 3    estradioL (ESTRACE) 0.01 % (0.1 mg/gram) vaginal cream, Place 1 g vaginally twice a week., Disp: 1 each, Rfl: 2    gabapentin (NEURONTIN) 300 MG capsule, Take 1 capsule (300 mg total) by mouth 2 (two) times daily as needed., Disp: 60 capsule, Rfl: 3    HYDROcodone-acetaminophen (NORCO) 5-325 mg per tablet, Take 1 tablet by mouth every 4 (four) hours as needed for Pain., Disp: 12 tablet, Rfl: 0    hydrOXYzine HCL (ATARAX) 25 MG tablet, Take 1 tablet (25 mg total) by mouth daily as needed for Itching., Disp: 15 tablet, Rfl: 0    NIFEdipine (ADALAT CC) 60 MG TbSR, Take 1 tablet (60 mg total) by mouth once daily., Disp: 90 tablet, Rfl: 1    omeprazole (PRILOSEC) 40 MG capsule, Take 1 capsule (40 mg total) by mouth once daily., Disp: 90 capsule, Rfl: 1    pramipexole (MIRAPEX) 0.25 MG tablet, Take 1 tablet by mouth once daily., Disp: , Rfl:     solifenacin (VESICARE) 5 MG tablet, Take 1 tablet (5 mg total) by mouth once daily., Disp: 90 tablet, Rfl: 3  Medications have been reviewed but not reconciled. She did not bring her meds today.    PHYSICAL EXAM:  /76 (BP Location: Left arm, Patient Position: Sitting)   Pulse 82   Ht 5' 3" (1.6 m)   Wt 79.8 kg (176 lb)   SpO2 99%   BMI 31.18 kg/m²   Wt Readings from Last 3 Encounters:   10/19/22 79.8 kg (176 lb)   10/10/22 81.4 kg (179 lb 6.4 oz)   09/29/22 80.7 kg (178 lb)      Body mass index is 31.18 kg/m².    Physical Exam  Vitals and nursing note reviewed.   Constitutional:       Appearance: Normal appearance. She is obese.   HENT:      Head: Normocephalic and atraumatic.   Eyes:      Pupils: Pupils are equal, round, and reactive to light.   Neck:      Vascular: No carotid bruit. "   Cardiovascular:      Rate and Rhythm: Normal rate and regular rhythm.      Pulses: Normal pulses.      Heart sounds: Normal heart sounds.   Pulmonary:      Effort: Pulmonary effort is normal.      Breath sounds: Normal breath sounds.   Abdominal:      General: Bowel sounds are normal.      Palpations: Abdomen is soft.   Musculoskeletal:      Cervical back: Neck supple.      Right lower leg: No edema.      Left lower leg: No edema.   Skin:     General: Skin is warm and dry.      Capillary Refill: Capillary refill takes less than 2 seconds.   Neurological:      General: No focal deficit present.      Mental Status: She is alert and oriented to person, place, and time.   Psychiatric:         Mood and Affect: Mood normal.         Behavior: Behavior normal.       LABS REVIEWED:  Lab Results   Component Value Date    WBC 4.35 (L) 06/01/2022    RBC 4.51 06/01/2022    HGB 13.7 06/01/2022    HCT 40.8 06/01/2022    MCV 90.5 06/01/2022    MCH 30.4 06/01/2022    MCHC 33.6 06/01/2022    RDW 13.0 06/01/2022     06/01/2022    MPV 13.0 (H) 06/01/2022    NRBC 0.0 06/01/2022     Lab Results   Component Value Date     06/01/2022    K 3.9 06/01/2022     06/01/2022    CO2 29 06/01/2022    BUN 11 06/01/2022     Lab Results   Component Value Date    AST 20 06/01/2022    ALT 13 06/01/2022     Lab Results   Component Value Date    GLU 78 06/01/2022    HGBA1C 5.1 08/26/2021     Lab Results   Component Value Date    CHOL 268 (H) 06/14/2022    HDL 66 (H) 06/14/2022    TRIG 84 06/14/2022    CHOLHDL 4.1 06/14/2022       CARDIAC STUDIES REVIEWED:EKG: normal EKG, normal sinus rhythm, unchanged from previous tracings.            ASSESSMENT:   Patient Active Problem List   Diagnosis    Polyneuropathy    RLS (restless legs syndrome)    Bilateral carpal tunnel syndrome    Hypertension    Obesity    Obstructive sleep apnea    Rheumatoid arthritis    Trigger finger of right thumb    Treatment-emergent central sleep apnea     Hypercholesterolemia    BMI 31.0-31.9,adult    Screening for viral disease    Gastroesophageal reflux disease without esophagitis    On long term drug therapy     Fall    OAB (overactive bladder)            Problem List Items Addressed This Visit          Cardiac/Vascular    Hypertension - Primary    Relevant Orders    EKG 12-lead (Completed)        PLAN:  Orders Placed This Encounter   Procedures    EKG 12-lead     Standing Status:   Future     Number of Occurrences:   1     Standing Expiration Date:   10/19/2023      RTC  1 year

## 2022-11-09 ENCOUNTER — TELEPHONE (OUTPATIENT)
Dept: OBSTETRICS AND GYNECOLOGY | Facility: CLINIC | Age: 72
End: 2022-11-09
Payer: MEDICARE

## 2022-11-09 NOTE — TELEPHONE ENCOUNTER
Returned patient call. Informed patient that prescription was sent in on 11/07/2022. She stated she did not pick it up today. I called pharmacy for patient and pharmacist stated they are preparing it now for her. Called patient back informed her it will be ready for  and she stated she will  tomorrow.

## 2022-11-11 ENCOUNTER — TELEPHONE (OUTPATIENT)
Dept: NEUROLOGY | Facility: CLINIC | Age: 72
End: 2022-11-11
Payer: MEDICARE

## 2022-11-11 NOTE — TELEPHONE ENCOUNTER
Called and informed patient that MRI brain showed mild microvascular ischemic change, no acute abnormality. The MRI of the cervical spine showed multilevel degenerative changes throughout the cervical spine. Postoperative changes at C3-4 ACDF. Pt voiced understanding by verbal return.

## 2022-11-15 ENCOUNTER — OFFICE VISIT (OUTPATIENT)
Dept: OBSTETRICS AND GYNECOLOGY | Facility: CLINIC | Age: 72
End: 2022-11-15
Payer: MEDICARE

## 2022-11-15 VITALS
RESPIRATION RATE: 16 BRPM | BODY MASS INDEX: 31.18 KG/M2 | DIASTOLIC BLOOD PRESSURE: 76 MMHG | HEART RATE: 70 BPM | HEIGHT: 63 IN | SYSTOLIC BLOOD PRESSURE: 142 MMHG | TEMPERATURE: 98 F | WEIGHT: 176 LBS

## 2022-11-15 DIAGNOSIS — N32.81 OAB (OVERACTIVE BLADDER): Primary | Chronic | ICD-10-CM

## 2022-11-15 DIAGNOSIS — R10.30 LOWER ABDOMINAL PAIN: ICD-10-CM

## 2022-11-15 DIAGNOSIS — G62.9 POLYNEUROPATHY: Chronic | ICD-10-CM

## 2022-11-15 DIAGNOSIS — R29.898 BILATERAL LEG WEAKNESS: ICD-10-CM

## 2022-11-15 DIAGNOSIS — N95.2 VAGINITIS, ATROPHIC: ICD-10-CM

## 2022-11-15 PROCEDURE — 3077F PR MOST RECENT SYSTOLIC BLOOD PRESSURE >= 140 MM HG: ICD-10-PCS | Mod: CPTII,,, | Performed by: OBSTETRICS & GYNECOLOGY

## 2022-11-15 PROCEDURE — 99214 OFFICE O/P EST MOD 30 MIN: CPT | Mod: ,,, | Performed by: OBSTETRICS & GYNECOLOGY

## 2022-11-15 PROCEDURE — 3078F DIAST BP <80 MM HG: CPT | Mod: CPTII,,, | Performed by: OBSTETRICS & GYNECOLOGY

## 2022-11-15 PROCEDURE — 3078F PR MOST RECENT DIASTOLIC BLOOD PRESSURE < 80 MM HG: ICD-10-PCS | Mod: CPTII,,, | Performed by: OBSTETRICS & GYNECOLOGY

## 2022-11-15 PROCEDURE — 1159F MED LIST DOCD IN RCRD: CPT | Mod: CPTII,,, | Performed by: OBSTETRICS & GYNECOLOGY

## 2022-11-15 PROCEDURE — 1159F PR MEDICATION LIST DOCUMENTED IN MEDICAL RECORD: ICD-10-PCS | Mod: CPTII,,, | Performed by: OBSTETRICS & GYNECOLOGY

## 2022-11-15 PROCEDURE — 3008F PR BODY MASS INDEX (BMI) DOCUMENTED: ICD-10-PCS | Mod: CPTII,,, | Performed by: OBSTETRICS & GYNECOLOGY

## 2022-11-15 PROCEDURE — 3008F BODY MASS INDEX DOCD: CPT | Mod: CPTII,,, | Performed by: OBSTETRICS & GYNECOLOGY

## 2022-11-15 PROCEDURE — 99214 PR OFFICE/OUTPT VISIT, EST, LEVL IV, 30-39 MIN: ICD-10-PCS | Mod: ,,, | Performed by: OBSTETRICS & GYNECOLOGY

## 2022-11-15 PROCEDURE — 1160F RVW MEDS BY RX/DR IN RCRD: CPT | Mod: CPTII,,, | Performed by: OBSTETRICS & GYNECOLOGY

## 2022-11-15 PROCEDURE — 1160F PR REVIEW ALL MEDS BY PRESCRIBER/CLIN PHARMACIST DOCUMENTED: ICD-10-PCS | Mod: CPTII,,, | Performed by: OBSTETRICS & GYNECOLOGY

## 2022-11-15 PROCEDURE — 3077F SYST BP >= 140 MM HG: CPT | Mod: CPTII,,, | Performed by: OBSTETRICS & GYNECOLOGY

## 2022-11-15 RX ORDER — ESCITALOPRAM OXALATE 10 MG/1
10 TABLET ORAL DAILY
COMMUNITY
Start: 2022-11-03 | End: 2023-03-14 | Stop reason: SDUPTHER

## 2022-11-15 NOTE — PROGRESS NOTES
Chrissie Cunningham female  for   Chief Complaint   Patient presents with    Follow-up     Follow up Abdominal U/S and Pelvic U/S done 2022          PHI:  71-year-old  4, para 4 Afro-American female presents for follow-up.  The patient is on VESIcare with good control of overactive bladder symptoms.  Nocturia has resolved as well.    The patient did have a abdominal and pelvic ultrasound that was performed on 2022.  The studies reveal no abdominal pathology except dilated common bile duct-normal liver stay enzymes were noted on study CMP.  The patient's lower abdominal pain has resolved.    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  She has no pelvic complaints.    Patient does use in addition to VESIcare, topical estradiol vaginal cream once weekly for atrophic vaginitis.    Past Medical History:   Diagnosis Date    Arthritis     Benign lipomatous neoplasm of skin and subcutaneous tissue of other sites 2007    Left upper back lipoma - large - resected 2006 at [facility] by Dr. Church    BV (bacterial vaginosis) 2011    Cataract     Chronic fibrocystic breast disease (FCBD) in female 2007    chronic bilaterally    GERD (gastroesophageal reflux disease)     Hypertension     Keloid of skin 2020    probably from scratching;  left and right side of mons pubis    Neuropathy     OAB (overactive bladder) 2017    by Urodynamics Study    Osteopenia 2013    as noted on 10/2011    Spinal stenosis 10/08/2013    on the left on neck x-ray    Spondylosis 10/08/2013    Stress incontinence, female 2017    by Urodynamics Study    Urinary incontinence, mixed 2017    by Urodynamics Study      Past Surgical History:   Procedure Laterality Date    BREAST BIOPSY       SECTION      CYST REMOVAL      EYE SURGERY      HYSTERECTOMY      OOPHORECTOMY      TRIGGER FINGER RELEASE Right 2022    Procedure: RELEASE,  "TRIGGER FINGER, THUMB;  Surgeon: Jayant Mandujano III, MD;  Location: [facility];  Service: Orthopedics;  Laterality: Right;      Review of patient's allergies indicates:   Allergen Reactions    Penicillins         ROS:Pertinent items are noted in HPI.    Physical exam:    BP (!) 142/76 (BP Location: Left arm, Patient Position: Sitting)   Pulse 70   Temp 97.7 °F (36.5 °C) (Temporal)   Resp 16   Ht 5' 3" (1.6 m)   Wt 79.8 kg (176 lb)   BMI 31.18 kg/m²      General Appearance: healthy, alert, no distress, smiling, BMI 31.18 ; patient is now using a walker for helping her stability during ambulation due leg weakness bilaterally.    Chest:           Lungs: clear to auscultation bilaterally and normal percussion bilaterally           Heart: regular rate and rhythm, S1, S2 normal, no murmur, click, rub or gallop, normal apical impulse, and regular rate and rhythm    Abdomen:  Soft and nontender  Pelvic: not performed     Extremity: normal, extremities warm, no clubbing, no cyanosis, no edema, non-tender    Skin: normal exam        Assessment:   Problem List Items Addressed This Visit          Neuro    Polyneuropathy (Chronic)       Renal/    OAB (overactive bladder) - Primary (Chronic)     Other Visit Diagnoses       Vaginitis, atrophic        Bilateral leg weakness        Lower abdominal pain        Such resolved/negative abdominal and pelvic ultrasound except for dilated bile duct with normal liver studies             Plan:  Patient was reassured after reviewing ultrasound studies as well as the laboratory studies including a CMP.  Patient was advised continue on estradiol vaginal cream at least weekly to treat atrophic vaginitis and continue on VESIcare since this has improved her overactive bladder issues.    Patient return for follow-up next year-6 months.    Addendum on 3/2/2023: Patient has\ requested a script for Cataflam renewal. There is no note indicating that I issued Cataflam - reviewed back to " 2021. Dr. Contreras will not renew without an clinic  visit!

## 2022-11-15 NOTE — PATIENT INSTRUCTIONS
Dr. Manuel Contreras thanks you for this office visit at [facility].    Diagnosis for this visit:   Problem List Items Addressed This Visit          Neuro    Polyneuropathy (Chronic)       Renal/    OAB (overactive bladder) - Primary (Chronic)     Other Visit Diagnoses       Vaginitis, atrophic        Bilateral leg weakness        Lower abdominal pain        Such resolved/negative abdominal and pelvic ultrasound except for dilated bile duct with normal liver studies             The Plan:  Patient was advised her abdominal and pelvic ultrasound unremarkable.  She was noted to have post cholecystectomy-gallbladder removal-enlarged common bile duct but her liver enzymes studies are normal.    Patient has good control of overactive bladder issues with the use of VESIcare.  She is to continue on VESIcare as well as topical vaginal estrogen to treat atrophic vaginitis.    Patient was advised to see Dr. Contreras in 6 months.      Please practice best food and exercise habits for your age.    Dr. Manuel Contreras recommends avoidance of smoking and illicit medications or habits.    Please call  or schedule for any change in your health.     Please keep the next scheduled appointment or call for any need to change the appointment.     Dr. Manuel Contreras recommends yearly exams for good health maintenance.      Thank you    Dr. Manuel Contreras  11/15/2022 12:22 PM

## 2022-11-16 NOTE — PROGRESS NOTES
Subjective:       Patient ID: Chrissie Cunningham is a 71 y.o. female.    Chief Complaint:  No chief complaint on file.      History of Present Illness  This pleasant 71 year old right handed  female presents to clinic for follow up of neuropathy, frequent falls, bilateral CTS and RLS. Patient reports gait and balance is about the same as last visit.  Patient reported at the last visit 4 to 5  falls from July to August 2022. She reported 3 falls in May 2022. She denies any head injury and did not seek any medical attention for the falls. Today she reports no falls but feels like her legs are weaker. She is ambulating with a walker that helps her balance. Recent MRI's of the brain and cervical spine do not explain her falls and as discussed in the plan at the last visit we will get the MRI of the lumbar and thoracic spine. IF the MRI of the lumbar and thoracic spine does not explain her falls then we will refer her to a movement disorder specialist. She does report low back pain, right hip pain, and right knee pain. She has completed the physical therapy and states it did not help. Discussed fall precautions and written education was given to the patient. She is pleased today with the treatment of the CTS and RLS and we will continue the current management for now.  She was started on Cymbalta 30 mg at bedtime for the Neuropathy and Depression. At the last visit she stated the cymbalta was helping but today she reports the cymbalta was causing her to have crazy dreams about snakes. She stopped the medication one week ago without consulting medical advice.  She is currently on gabapentin 300 mg one twice a day and is tolerating this medication. Patient states neuropathy started 2 years ago with burning, numbness, and tingling that she rates today as a 4 on a scale of 0-5.  Symptoms are located in the bilateral legs, feet, and bilateral hands that is worse in the legs and feet. Symptoms are intermittent with  pain rated as a 5 on a scale of 0-10 and described as a stinging pain that is worse during the daytime.  Precipitating factors are prolonged sitting, activity and walking. She denies diabetes, back injury or neck injury. She  has chronic neck pain from neck surgery in 2013. She has been evaluated by vascular surgeon Dr. EVER Hernandez in the past.  Arterial doppler with exercise done on September 22, 2020 showed no significant disease when compared to the resting study done in August 2020. Venous doppler done on August 5, 2020 was negative for DVT. Recent EMG NCS supports CTS but was negative in the legs. She was referred to orthopedics at the last visit to evaluate and treat the CTS. She reports having trigger finger surgery and the CTS is greatly improved. She was referred to orthopedics for the knee and hip pain and has an appointment at the end of November 2022. She also reports symptoms of RLS with cramps and the urge to move her legs that is relieved with movement but then returns. She states her PCP has her on Mirapex for the RLS and states this helps. She denies smoking or drinking alcohol and had a total hysterectomy in 2012. Discussed the plan in detail with Neurologist Dr. DORIS Pack and the patient and she is in agreement with the plan. All her questions were answered at today's visit.       Past Neuropathy medications: Cymbalta, Lyrica      EMG NCS of all 4 extremities done on July 12, 2021 at the [facility] by Dr. KEIRY Olguin showed predominantly sensory peripheral neuropathy. Bilateral median nerve entrapment neuropathy at the carpal tunnel, left greater than right. Normal EMG of the bilateral lower extremities with no electrophysiological evidence of denervation or neuropathic changes.     MRI of the brain with and without contrast done at [facility] on October 13, 2022 showed mild chronic microvascular ischemic change. No acute abnormality.     MRI of the cervical spine with and  without contrast done at [facility] on October 13, 2022 showed multilevel degenerative changes throughout the cervical spine as detailed. Postoperative changes C3-4 ACDF.           Review of Systems  Review of Systems   Constitutional:  Negative for activity change, diaphoresis, fever and unexpected weight change.   HENT:  Negative for congestion, ear pain, facial swelling, hearing loss, tinnitus, trouble swallowing and voice change.    Eyes:  Negative for photophobia, pain and visual disturbance.   Respiratory:  Negative for chest tightness, shortness of breath and wheezing.    Cardiovascular:  Negative for chest pain, palpitations and leg swelling.   Gastrointestinal:  Negative for constipation, diarrhea, nausea and vomiting.   Genitourinary:  Negative for difficulty urinating.   Musculoskeletal:  Positive for back pain and gait problem. Negative for neck pain and neck stiffness.   Skin:  Negative for color change, pallor, rash and wound.   Neurological:  Positive for numbness. Negative for dizziness, tremors, seizures, syncope, facial asymmetry, speech difficulty, weakness, light-headedness and headaches.        RLS        CTS    Psychiatric/Behavioral:  Negative for agitation, behavioral problems, confusion, decreased concentration and hallucinations. The patient is not nervous/anxious and is not hyperactive.      Objective:      Neurologic Exam     Mental Status   Oriented to person, place, and time.   Oriented to person.   Oriented to place.   Oriented to time.   Attention: normal. Concentration: normal.   Speech: speech is normal   Level of consciousness: alert  Knowledge: good.     Cranial Nerves     CN II   Visual fields full to confrontation.     CN III, IV, VI   Pupils are equal, round, and reactive to light.  Extraocular motions are normal.   Right pupil: Size: 3 mm. Shape: regular. Reactivity: brisk.   Left pupil: Size: 3 mm. Shape: regular. Reactivity: brisk.   CN III: no CN III palsy  CN VI: no CN VI  palsy    CN V   Facial sensation intact.     CN VII   Facial expression full, symmetric.     CN VIII   CN VIII normal.   Hearing: intact    CN IX, X   CN IX normal.   CN X normal.     CN XI   CN XI normal.     CN XII   CN XII normal.     Motor Exam   Muscle bulk: normal  Overall muscle tone: normal  Right arm pronator drift: absent  Left arm pronator drift: absent    Strength   Right deltoid: 5/5  Left deltoid: 5/5  Right biceps: 5/5  Left biceps: 5/5  Right triceps: 5/5  Left triceps: 5/5  Right quadriceps: 4/5  Left quadriceps: 5/5  Right hamstrin/5  Left hamstrin/5    Sensory Exam   Light touch normal.     Gait, Coordination, and Reflexes     Coordination   Romberg: negative  Finger to nose coordination: normal  Heel to shin coordination: normal  Tandem walking coordination: normal    Tremor   Resting tremor: absent  Intention tremor: absent  Action tremor: absent    Reflexes   Right brachioradialis: 2+  Left brachioradialis: 2+  Right biceps: 2+  Left biceps: 2+  Right triceps: 2+  Left triceps: 2+  Right patellar: 2+  Left patellar: 2+  Right achilles: 2+  Left achilles: 2+  Right : 4+  Left : 4+  Ambulates with a walker      Physical Exam  Constitutional:       General: She is not in acute distress.  HENT:      Head: Normocephalic.      Nose: Nose normal.      Mouth/Throat:      Mouth: Mucous membranes are moist.   Eyes:      Extraocular Movements: Extraocular movements intact and EOM normal.      Pupils: Pupils are equal, round, and reactive to light.   Cardiovascular:      Rate and Rhythm: Normal rate and regular rhythm.      Heart sounds: Normal heart sounds. No murmur heard.  Pulmonary:      Effort: Pulmonary effort is normal. No respiratory distress.      Breath sounds: Normal breath sounds. No wheezing, rhonchi or rales.   Musculoskeletal:         General: No swelling, tenderness, deformity or signs of injury. Normal range of motion.      Cervical back: Normal range of motion. No rigidity  or tenderness.      Right lower leg: No edema.      Left lower leg: No edema.   Skin:     General: Skin is warm and dry.      Capillary Refill: Capillary refill takes less than 2 seconds.      Coloration: Skin is not jaundiced or pale.      Findings: No bruising, erythema, lesion or rash.   Neurological:      Mental Status: She is alert and oriented to person, place, and time.      Coordination: Finger-Nose-Finger Test, Heel to Shin Test and Romberg Test normal.      Gait: Tandem walk normal.      Deep Tendon Reflexes:      Reflex Scores:       Tricep reflexes are 2+ on the right side and 2+ on the left side.       Bicep reflexes are 2+ on the right side and 2+ on the left side.       Brachioradialis reflexes are 2+ on the right side and 2+ on the left side.       Patellar reflexes are 2+ on the right side and 2+ on the left side.       Achilles reflexes are 2+ on the right side and 2+ on the left side.  Psychiatric:         Mood and Affect: Mood normal.         Speech: Speech normal.         Behavior: Behavior normal.         Assessment:     Problem List Items Addressed This Visit          Neuro    Polyneuropathy - Primary (Chronic)    Relevant Orders    Creatinine, Serum (Completed)    RLS (restless legs syndrome) (Chronic)    Bilateral carpal tunnel syndrome (Chronic)       Orthopedic    Fall    Relevant Orders    MRI Lumbar Spine W WO Contrast    MRI Thoracic Spine W WO Contrast       Palliative Care    On long term drug therapy     Relevant Orders    Creatinine, Serum (Completed)         Plan:     Discontinue Cymbalta, patient stopped one week ago  2.   Open at [facility] MRI of the lumbar spine with and without contrast to evaluate falls  3.   Open at [facility] MRI of the thoracic spine with and without contrast to evaluate falls   4.   Use walker or 4 prong cane for better balance  5.   wear wrist splints at night  6.   Fall precautions, use walker  7.   Labs: creatinine  8.   Regular follow up with PCP for  medical management, gabapentin, and Mirapex   9. IF work up is negative the referral to movement disorder specialist  10. Keep appointment with Orthopedics Dr. Mandujano on November 29, 2022 for bilateral knee and right hip pain  11. Follow up with neurology in 2 months or sooner if needed

## 2022-11-21 ENCOUNTER — OFFICE VISIT (OUTPATIENT)
Dept: NEUROLOGY | Facility: CLINIC | Age: 72
End: 2022-11-21
Payer: MEDICARE

## 2022-11-21 VITALS
HEIGHT: 63 IN | WEIGHT: 178 LBS | HEART RATE: 70 BPM | SYSTOLIC BLOOD PRESSURE: 154 MMHG | DIASTOLIC BLOOD PRESSURE: 88 MMHG | BODY MASS INDEX: 31.54 KG/M2 | OXYGEN SATURATION: 98 %

## 2022-11-21 DIAGNOSIS — G56.03 BILATERAL CARPAL TUNNEL SYNDROME: Chronic | ICD-10-CM

## 2022-11-21 DIAGNOSIS — G25.81 RLS (RESTLESS LEGS SYNDROME): Chronic | ICD-10-CM
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
## 2022-11-21 DIAGNOSIS — G62.9 POLYNEUROPATHY: Primary | Chronic | ICD-10-CM

## 2022-11-21 DIAGNOSIS — Z79.899 ON LONG TERM DRUG THERAPY: ICD-10-CM

## 2022-11-21 PROCEDURE — 1159F MED LIST DOCD IN RCRD: CPT | Mod: CPTII,,, | Performed by: NURSE PRACTITIONER

## 2022-11-21 PROCEDURE — 3077F PR MOST RECENT SYSTOLIC BLOOD PRESSURE >= 140 MM HG: ICD-10-PCS | Mod: CPTII,,, | Performed by: NURSE PRACTITIONER

## 2022-11-21 PROCEDURE — 3008F PR BODY MASS INDEX (BMI) DOCUMENTED: ICD-10-PCS | Mod: CPTII,,, | Performed by: NURSE PRACTITIONER

## 2022-11-21 PROCEDURE — 1160F RVW MEDS BY RX/DR IN RCRD: CPT | Mod: CPTII,,, | Performed by: NURSE PRACTITIONER

## 2022-11-21 PROCEDURE — 99214 OFFICE O/P EST MOD 30 MIN: CPT | Mod: S$PBB,,, | Performed by: NURSE PRACTITIONER

## 2022-11-21 PROCEDURE — 1159F PR MEDICATION LIST DOCUMENTED IN MEDICAL RECORD: ICD-10-PCS | Mod: CPTII,,, | Performed by: NURSE PRACTITIONER

## 2022-11-21 PROCEDURE — 3077F SYST BP >= 140 MM HG: CPT | Mod: CPTII,,, | Performed by: NURSE PRACTITIONER

## 2022-11-21 PROCEDURE — 1101F PR PT FALLS ASSESS DOC 0-1 FALLS W/OUT INJ PAST YR: ICD-10-PCS | Mod: CPTII,,, | Performed by: NURSE PRACTITIONER

## 2022-11-21 PROCEDURE — 3079F PR MOST RECENT DIASTOLIC BLOOD PRESSURE 80-89 MM HG: ICD-10-PCS | Mod: CPTII,,, | Performed by: NURSE PRACTITIONER

## 2022-11-21 PROCEDURE — 3288F FALL RISK ASSESSMENT DOCD: CPT | Mod: CPTII,,, | Performed by: NURSE PRACTITIONER

## 2022-11-21 PROCEDURE — 99214 PR OFFICE/OUTPT VISIT, EST, LEVL IV, 30-39 MIN: ICD-10-PCS | Mod: S$PBB,,, | Performed by: NURSE PRACTITIONER

## 2022-11-21 PROCEDURE — 99215 OFFICE O/P EST HI 40 MIN: CPT | Mod: PBBFAC | Performed by: NURSE PRACTITIONER

## 2022-11-21 PROCEDURE — 3288F PR FALLS RISK ASSESSMENT DOCUMENTED: ICD-10-PCS | Mod: CPTII,,, | Performed by: NURSE PRACTITIONER

## 2022-11-21 PROCEDURE — 3079F DIAST BP 80-89 MM HG: CPT | Mod: CPTII,,, | Performed by: NURSE PRACTITIONER

## 2022-11-21 PROCEDURE — 3008F BODY MASS INDEX DOCD: CPT | Mod: CPTII,,, | Performed by: NURSE PRACTITIONER

## 2022-11-21 PROCEDURE — 1160F PR REVIEW ALL MEDS BY PRESCRIBER/CLIN PHARMACIST DOCUMENTED: ICD-10-PCS | Mod: CPTII,,, | Performed by: NURSE PRACTITIONER

## 2022-11-21 PROCEDURE — 1101F PT FALLS ASSESS-DOCD LE1/YR: CPT | Mod: CPTII,,, | Performed by: NURSE PRACTITIONER

## 2022-11-21 NOTE — PATIENT INSTRUCTIONS
Discontinue Cymbalta, patient stopped one week ago  2.   Open at [facility] MRI of the lumbar spine with and without contrast to evaluate falls  3.   Open at [facility] MRI of the thoracic spine with and without contrast to evaluate falls   4.   Use walker or 4 prong cane for better balance  5.   wear wrist splints at night  6.   Fall precautions, use walker  7.   Labs: creatinine  8.   Regular follow up with PCP for medical management, gabapentin, and Mirapex   9. IF work up is negative the referral to movement disorder specialist  10. Keep appointment with Orthopedics Dr. Mandujano on November 29, 2022 for bilateral knee and right hip pain  11. Follow up with neurology in 2 months or sooner if needed

## 2022-12-06 ENCOUNTER — HOSPITAL ENCOUNTER (OUTPATIENT)
Dept: RADIOLOGY | Facility: HOSPITAL | Age: 72
Discharge: HOME | End: 2022-12-06
Attending: ORTHOPAEDIC SURGERY
Payer: MEDICARE

## 2022-12-06 DIAGNOSIS — M25.561 ACUTE PAIN OF RIGHT KNEE: ICD-10-CM

## 2022-12-06 DIAGNOSIS — M25.551 HIP PAIN, RIGHT: ICD-10-CM

## 2022-12-06 PROCEDURE — 73502 X-RAY EXAM HIP UNI 2-3 VIEWS: CPT | Mod: TC,RT

## 2022-12-06 PROCEDURE — 73564 X-RAY EXAM KNEE 4 OR MORE: CPT | Mod: TC,RT

## 2023-01-19 ENCOUNTER — TELEPHONE (OUTPATIENT)
Dept: FAMILY MEDICINE | Facility: CLINIC | Age: 73
End: 2023-01-19
Payer: MEDICARE

## 2023-01-19 NOTE — TELEPHONE ENCOUNTER
----- Message from Natalie Hinojosa sent at 1/18/2023  1:08 PM CST -----  Pt needing a call back from nurse. [phone]- call made to pt regarding above message; no answer and unable to leave voicemail.

## 2023-01-20 ENCOUNTER — TELEPHONE (OUTPATIENT)
Dept: NEUROLOGY | Facility: CLINIC | Age: 73
End: 2023-01-20
Payer: MEDICARE

## 2023-01-20 NOTE — TELEPHONE ENCOUNTER
Called pt with open MRI results from mary. Pt stated eugenia went over the results with her and pt declined the second opinion referral to [facility] due to being seen by another provider for the falls.

## 2023-03-01 ENCOUNTER — TELEPHONE (OUTPATIENT)
Dept: OBSTETRICS AND GYNECOLOGY | Facility: CLINIC | Age: 73
End: 2023-03-01
Payer: MEDICARE

## 2023-03-02 ENCOUNTER — TELEPHONE (OUTPATIENT)
Dept: OBSTETRICS AND GYNECOLOGY | Facility: CLINIC | Age: 73
End: 2023-03-02
Payer: MEDICARE

## 2023-03-03 NOTE — TELEPHONE ENCOUNTER
----- Message from Manuel Contreras MD   Informed patient Renewal of the medication was reviewed in chart for almost 2 years now and there is no reference to diclofenac.  This was probably a prescription written on a [employer] system.  From a medical legal standpoint he can not renew a prescription that he has not authorized this past year.  For renewal you will need a clinic visit. Patient voiced understanding and made appointment.

## 2023-03-14 ENCOUNTER — OFFICE VISIT (OUTPATIENT)
Dept: OBSTETRICS AND GYNECOLOGY | Facility: CLINIC | Age: 73
End: 2023-03-14
Payer: MEDICARE

## 2023-03-14 ENCOUNTER — OFFICE VISIT (OUTPATIENT)
Dept: FAMILY MEDICINE | Facility: CLINIC | Age: 73
End: 2023-03-14
Payer: MEDICARE

## 2023-03-14 ENCOUNTER — APPOINTMENT (OUTPATIENT)
Dept: RADIOLOGY | Facility: CLINIC | Age: 73
End: 2023-03-14
Attending: INTERNAL MEDICINE
Payer: MEDICARE

## 2023-03-14 VITALS
DIASTOLIC BLOOD PRESSURE: 80 MMHG | BODY MASS INDEX: 32.49 KG/M2 | RESPIRATION RATE: 18 BRPM | TEMPERATURE: 98 F | WEIGHT: 183.38 LBS | OXYGEN SATURATION: 97 % | HEIGHT: 63 IN | SYSTOLIC BLOOD PRESSURE: 161 MMHG

## 2023-03-14 VITALS
HEART RATE: 63 BPM | HEIGHT: 62 IN | WEIGHT: 183 LBS | TEMPERATURE: 98 F | BODY MASS INDEX: 33.68 KG/M2 | RESPIRATION RATE: 18 BRPM | OXYGEN SATURATION: 97 % | SYSTOLIC BLOOD PRESSURE: 156 MMHG | DIASTOLIC BLOOD PRESSURE: 85 MMHG

## 2023-03-14 DIAGNOSIS — N32.81 OAB (OVERACTIVE BLADDER): ICD-10-CM

## 2023-03-14 DIAGNOSIS — K21.9 GASTROESOPHAGEAL REFLUX DISEASE WITHOUT ESOPHAGITIS: ICD-10-CM

## 2023-03-14 DIAGNOSIS — M19.019 OSTEOARTHRITIS OF SHOULDER, UNSPECIFIED LATERALITY, UNSPECIFIED OSTEOARTHRITIS TYPE: ICD-10-CM

## 2023-03-14 DIAGNOSIS — M81.0 AGE-RELATED OSTEOPOROSIS WITHOUT CURRENT PATHOLOGICAL FRACTURE: ICD-10-CM

## 2023-03-14 DIAGNOSIS — Z01.419 ENCOUNTER FOR ANNUAL ROUTINE GYNECOLOGICAL EXAMINATION: ICD-10-CM

## 2023-03-14 DIAGNOSIS — N95.2 VAGINITIS, ATROPHIC: ICD-10-CM

## 2023-03-14 DIAGNOSIS — I10 HYPERTENSION, UNSPECIFIED TYPE: ICD-10-CM

## 2023-03-14 DIAGNOSIS — M85.80 OSTEOPENIA, UNSPECIFIED LOCATION: Primary | ICD-10-CM

## 2023-03-14 DIAGNOSIS — Z85.3 HISTORY OF LEFT BREAST CANCER: ICD-10-CM

## 2023-03-14 DIAGNOSIS — M19.019 OSTEOARTHRITIS OF SHOULDER, UNSPECIFIED LATERALITY, UNSPECIFIED OSTEOARTHRITIS TYPE: Primary | ICD-10-CM

## 2023-03-14 DIAGNOSIS — F32.0 CURRENT MILD EPISODE OF MAJOR DEPRESSIVE DISORDER, UNSPECIFIED WHETHER RECURRENT: Chronic | ICD-10-CM

## 2023-03-14 DIAGNOSIS — E78.5 DYSLIPIDEMIA: Chronic | ICD-10-CM

## 2023-03-14 DIAGNOSIS — E55.9 VITAMIN D DEFICIENCY: ICD-10-CM

## 2023-03-14 LAB
BILIRUB UR QL STRIP: NEGATIVE
CLARITY UR: CLEAR
COLOR UR: ABNORMAL
GLUCOSE UR STRIP-MCNC: NORMAL MG/DL
KETONES UR STRIP-SCNC: NEGATIVE MG/DL
LEUKOCYTE ESTERASE UR QL STRIP: NEGATIVE
MUCOUS, UA: ABNORMAL /LPF
NITRITE UR QL STRIP: NEGATIVE
PH UR STRIP: 6 PH UNITS
PROT UR QL STRIP: 10
RBC # UR STRIP: NEGATIVE /UL
RBC #/AREA URNS HPF: 1 /HPF
SP GR UR STRIP: 1.02
SQUAMOUS #/AREA URNS LPF: ABNORMAL /HPF
UROBILINOGEN UR STRIP-ACNC: NORMAL MG/DL
WBC #/AREA URNS HPF: 2 /HPF

## 2023-03-14 PROCEDURE — 3288F PR FALLS RISK ASSESSMENT DOCUMENTED: ICD-10-PCS | Mod: ,,, | Performed by: INTERNAL MEDICINE

## 2023-03-14 PROCEDURE — 3008F PR BODY MASS INDEX (BMI) DOCUMENTED: ICD-10-PCS | Mod: ,,, | Performed by: INTERNAL MEDICINE

## 2023-03-14 PROCEDURE — 81001 URINALYSIS: ICD-10-PCS | Mod: ,,, | Performed by: CLINICAL MEDICAL LABORATORY

## 2023-03-14 PROCEDURE — 1101F PT FALLS ASSESS-DOCD LE1/YR: CPT | Mod: ,,, | Performed by: INTERNAL MEDICINE

## 2023-03-14 PROCEDURE — 1160F RVW MEDS BY RX/DR IN RCRD: CPT | Mod: CPTII,,, | Performed by: OBSTETRICS & GYNECOLOGY

## 2023-03-14 PROCEDURE — 1125F AMNT PAIN NOTED PAIN PRSNT: CPT | Mod: CPTII,,, | Performed by: OBSTETRICS & GYNECOLOGY

## 2023-03-14 PROCEDURE — 3008F PR BODY MASS INDEX (BMI) DOCUMENTED: ICD-10-PCS | Mod: CPTII,,, | Performed by: OBSTETRICS & GYNECOLOGY

## 2023-03-14 PROCEDURE — 1159F MED LIST DOCD IN RCRD: CPT | Mod: CPTII,,, | Performed by: OBSTETRICS & GYNECOLOGY

## 2023-03-14 PROCEDURE — 1160F PR REVIEW ALL MEDS BY PRESCRIBER/CLIN PHARMACIST DOCUMENTED: ICD-10-PCS | Mod: ,,, | Performed by: INTERNAL MEDICINE

## 2023-03-14 PROCEDURE — 3079F PR MOST RECENT DIASTOLIC BLOOD PRESSURE 80-89 MM HG: ICD-10-PCS | Mod: CPTII,,, | Performed by: OBSTETRICS & GYNECOLOGY

## 2023-03-14 PROCEDURE — 1126F AMNT PAIN NOTED NONE PRSNT: CPT | Mod: ,,, | Performed by: INTERNAL MEDICINE

## 2023-03-14 PROCEDURE — 3079F DIAST BP 80-89 MM HG: CPT | Mod: ,,, | Performed by: INTERNAL MEDICINE

## 2023-03-14 PROCEDURE — 81001 URINALYSIS AUTO W/SCOPE: CPT | Mod: ,,, | Performed by: CLINICAL MEDICAL LABORATORY

## 2023-03-14 PROCEDURE — 3079F PR MOST RECENT DIASTOLIC BLOOD PRESSURE 80-89 MM HG: ICD-10-PCS | Mod: ,,, | Performed by: INTERNAL MEDICINE

## 2023-03-14 PROCEDURE — 1125F PR PAIN SEVERITY QUANTIFIED, PAIN PRESENT: ICD-10-PCS | Mod: CPTII,,, | Performed by: OBSTETRICS & GYNECOLOGY

## 2023-03-14 PROCEDURE — 1159F MED LIST DOCD IN RCRD: CPT | Mod: ,,, | Performed by: INTERNAL MEDICINE

## 2023-03-14 PROCEDURE — 73030 X-RAY EXAM OF SHOULDER: CPT | Mod: TC,RHCUB,LT | Performed by: INTERNAL MEDICINE

## 2023-03-14 PROCEDURE — 3288F FALL RISK ASSESSMENT DOCD: CPT | Mod: ,,, | Performed by: INTERNAL MEDICINE

## 2023-03-14 PROCEDURE — 1160F PR REVIEW ALL MEDS BY PRESCRIBER/CLIN PHARMACIST DOCUMENTED: ICD-10-PCS | Mod: CPTII,,, | Performed by: OBSTETRICS & GYNECOLOGY

## 2023-03-14 PROCEDURE — G0101 PR CA SCREEN;PELVIC/BREAST EXAM: ICD-10-PCS | Mod: S$PBB,GZ,, | Performed by: OBSTETRICS & GYNECOLOGY

## 2023-03-14 PROCEDURE — 88142 CYTOPATH C/V THIN LAYER: CPT | Mod: TC,GCY | Performed by: OBSTETRICS & GYNECOLOGY

## 2023-03-14 PROCEDURE — 3077F PR MOST RECENT SYSTOLIC BLOOD PRESSURE >= 140 MM HG: ICD-10-PCS | Mod: CPTII,,, | Performed by: OBSTETRICS & GYNECOLOGY

## 2023-03-14 PROCEDURE — 99215 OFFICE O/P EST HI 40 MIN: CPT | Mod: PBBFAC | Performed by: OBSTETRICS & GYNECOLOGY

## 2023-03-14 PROCEDURE — 1160F RVW MEDS BY RX/DR IN RCRD: CPT | Mod: ,,, | Performed by: INTERNAL MEDICINE

## 2023-03-14 PROCEDURE — 3077F SYST BP >= 140 MM HG: CPT | Mod: ,,, | Performed by: INTERNAL MEDICINE

## 2023-03-14 PROCEDURE — 99214 PR OFFICE/OUTPT VISIT, EST, LEVL IV, 30-39 MIN: ICD-10-PCS | Mod: ,,, | Performed by: INTERNAL MEDICINE

## 2023-03-14 PROCEDURE — 3079F DIAST BP 80-89 MM HG: CPT | Mod: CPTII,,, | Performed by: OBSTETRICS & GYNECOLOGY

## 2023-03-14 PROCEDURE — 3008F BODY MASS INDEX DOCD: CPT | Mod: CPTII,,, | Performed by: OBSTETRICS & GYNECOLOGY

## 2023-03-14 PROCEDURE — G0101 CA SCREEN;PELVIC/BREAST EXAM: HCPCS | Mod: S$PBB,GZ,, | Performed by: OBSTETRICS & GYNECOLOGY

## 2023-03-14 PROCEDURE — 3077F PR MOST RECENT SYSTOLIC BLOOD PRESSURE >= 140 MM HG: ICD-10-PCS | Mod: ,,, | Performed by: INTERNAL MEDICINE

## 2023-03-14 PROCEDURE — 99214 OFFICE O/P EST MOD 30 MIN: CPT | Mod: ,,, | Performed by: INTERNAL MEDICINE

## 2023-03-14 PROCEDURE — 3008F BODY MASS INDEX DOCD: CPT | Mod: ,,, | Performed by: INTERNAL MEDICINE

## 2023-03-14 PROCEDURE — 1159F PR MEDICATION LIST DOCUMENTED IN MEDICAL RECORD: ICD-10-PCS | Mod: CPTII,,, | Performed by: OBSTETRICS & GYNECOLOGY

## 2023-03-14 PROCEDURE — 3077F SYST BP >= 140 MM HG: CPT | Mod: CPTII,,, | Performed by: OBSTETRICS & GYNECOLOGY

## 2023-03-14 PROCEDURE — 1126F PR PAIN SEVERITY QUANTIFIED, NO PAIN PRESENT: ICD-10-PCS | Mod: ,,, | Performed by: INTERNAL MEDICINE

## 2023-03-14 PROCEDURE — 1159F PR MEDICATION LIST DOCUMENTED IN MEDICAL RECORD: ICD-10-PCS | Mod: ,,, | Performed by: INTERNAL MEDICINE

## 2023-03-14 PROCEDURE — 1101F PR PT FALLS ASSESS DOC 0-1 FALLS W/OUT INJ PAST YR: ICD-10-PCS | Mod: ,,, | Performed by: INTERNAL MEDICINE

## 2023-03-14 RX ORDER — OMEPRAZOLE 40 MG/1
40 CAPSULE, DELAYED RELEASE ORAL DAILY
Qty: 90 CAPSULE | Refills: 1 | Status: SHIPPED | OUTPATIENT
Start: 2023-03-14 | End: 2023-08-31

## 2023-03-14 RX ORDER — ACYCLOVIR 400 MG/1
400 TABLET ORAL 2 TIMES DAILY
Qty: 180 TABLET | Refills: 3 | Status: SHIPPED | OUTPATIENT
Start: 2023-03-14 | End: 2023-04-04

## 2023-03-14 RX ORDER — CYCLOBENZAPRINE HCL 10 MG
10 TABLET ORAL 3 TIMES DAILY PRN
Qty: 30 TABLET | Refills: 0 | Status: SHIPPED | OUTPATIENT
Start: 2023-03-14 | End: 2023-11-02 | Stop reason: SDUPTHER

## 2023-03-14 RX ORDER — SOLIFENACIN SUCCINATE 5 MG/1
5 TABLET, FILM COATED ORAL DAILY
Qty: 90 TABLET | Refills: 3 | Status: SHIPPED | OUTPATIENT
Start: 2023-03-14 | End: 2024-03-11

## 2023-03-14 RX ORDER — ATORVASTATIN CALCIUM 10 MG/1
10 TABLET, FILM COATED ORAL DAILY
Qty: 90 TABLET | Refills: 1 | Status: SHIPPED | OUTPATIENT
Start: 2023-03-14 | End: 2023-11-02 | Stop reason: SDUPTHER

## 2023-03-14 RX ORDER — NIFEDIPINE 60 MG/1
60 TABLET, EXTENDED RELEASE ORAL DAILY
Qty: 90 TABLET | Refills: 1 | Status: SHIPPED | OUTPATIENT
Start: 2023-03-14 | End: 2023-06-27 | Stop reason: SDUPTHER

## 2023-03-14 RX ORDER — ESTRADIOL 0.1 MG/G
1 CREAM VAGINAL
Qty: 1 EACH | Refills: 2 | Status: SHIPPED | OUTPATIENT
Start: 2023-03-16 | End: 2024-03-15

## 2023-03-14 RX ORDER — ESCITALOPRAM OXALATE 10 MG/1
10 TABLET ORAL
Qty: 6 TABLET | Refills: 0 | Status: SHIPPED | OUTPATIENT
Start: 2023-03-14 | End: 2023-04-04

## 2023-03-14 RX ORDER — NAPROXEN 500 MG/1
500 TABLET ORAL 2 TIMES DAILY WITH MEALS
Qty: 20 TABLET | Refills: 0 | Status: SHIPPED | OUTPATIENT
Start: 2023-03-14 | End: 2023-04-04

## 2023-03-14 RX ORDER — GABAPENTIN 300 MG/1
300 CAPSULE ORAL 2 TIMES DAILY PRN
Qty: 60 CAPSULE | Refills: 3 | Status: SHIPPED | OUTPATIENT
Start: 2023-03-14 | End: 2023-11-02 | Stop reason: SDUPTHER

## 2023-03-14 RX ORDER — MELOXICAM 7.5 MG/1
7.5 TABLET ORAL DAILY
Qty: 30 TABLET | Refills: 1 | Status: SHIPPED | OUTPATIENT
Start: 2023-03-14 | End: 2023-04-04

## 2023-03-14 NOTE — PROGRESS NOTES
Chrissie Cunningham female  for   Chief Complaint   Patient presents with    Well Woman     Well woman exam with pap smear      OB History          4    Para   4    Term                AB        Living   4         SAB        IAB        Ectopic        Multiple        Live Births                      PHI:  Patient is 72  4, para 4 presents for a gynecologic annual exam.  Patient has no voice gynecologic problems.  In the distant past patient has had a REGI and BSO.  Patient does use VESIcare for relief of overactive bladder issues.  The patient is very happy with that treatment.  She denies nocturia.    Patient is not sexually active.    Her last mammogram was 2022.  This was a BI-RADS 2.  Her last Pap smear was unremarkable -  2021.  Past Medical History:   Diagnosis Date    Arthritis     Benign lipomatous neoplasm of skin and subcutaneous tissue of other sites 2007    Left upper back lipoma - large - resected 2006 at [facility] by Dr. Church    BV (bacterial vaginosis) 2011    Cataract     Chronic fibrocystic breast disease (FCBD) in female 2007    chronic bilaterally    GERD (gastroesophageal reflux disease)     Hypertension     Keloid of skin 2020    probably from scratching;  left and right side of mons pubis    Neuropathy     OAB (overactive bladder) 2017    by Urodynamics Study    Osteopenia 2013    as noted on 10/2011    Spinal stenosis 10/08/2013    on the left on neck x-ray    Spondylosis 10/08/2013    Stress incontinence, female 2017    by Urodynamics Study    Urinary incontinence, mixed 2017    by Urodynamics Study      Past Surgical History:   Procedure Laterality Date    BREAST BIOPSY       SECTION      CYST REMOVAL      EYE SURGERY      HYSTERECTOMY      OOPHORECTOMY      TRIGGER FINGER RELEASE Right 2022    Procedure: RELEASE, TRIGGER FINGER, THUMB;  Surgeon: Jayant Mandujano III, MD;  Location: [facility]  "OR;  Service: Orthopedics;  Laterality: Right;      Review of patient's allergies indicates:   Allergen Reactions    Penicillins     Hydrocodone-acetaminophen Nausea And Vomiting     Other reaction(s): Unknown        ROS:Pertinent items are noted in HPI.    Physical exam:    BP (!) 156/85 (BP Location: Left arm, Patient Position: Sitting)   Pulse 63   Temp 98.2 °F (36.8 °C)   Resp 18   Ht 5' 2" (1.575 m)   Wt 83 kg (183 lb)   SpO2 97%   BMI 33.47 kg/m²      General Appearance: healthy, alert, no distress, smiling    HEENT: normal exam    Lymphatic: no palpable lymphadenopathy, no hepatosplenomegaly    Chest:         Breasts:No dimpling, nipple retraction or discharge. No masses or nodes., Normal to inspection, Normal breast tissue bilaterally, and lumpectomy scar beneath left breast as well as axillary lymphadenopathy scar.         Lungs: clear to auscultation bilaterally         Heart: regular rate and rhythm, S1, S2 normal, no murmur, click, rub or gallop    Abdomen:soft, non-tender, without masses or organomegaly, normal bowel sounds, without guarding, without rebound, and well-healed hypogastric midline incision without evidence of ventral hernia, no abdominal  Bruit and no evidence of ascites.  Pelvic:             Vulva:normal, normal female genitalia, Bartholin's, Urethra, Valeria normal, no lesions, normal female hair distribution            Vagina:normal mucosa, no discharge, atrophic, no cystocele, no vaginal vault prolapse or rectocele            Uterus: surgically absent uterus and cervix            Adnexae: surgically absent    Rectal:negative    Extremity: normal, extremities warm, no clubbing, no cyanosis, no edema, non-tender    Skin: normal exam        Assessment:   Problem List Items Addressed This Visit          Renal/    OAB (overactive bladder) (Chronic)       Oncology    History of left breast cancer (Chronic)     Other Visit Diagnoses       Encounter for annual routine gynecological " examination    -  Primary    Vaginitis, atrophic        Vitamin D deficiency                 Plan:  Screening labs including CBC and CMP as well as urinalysis; vitamin-D check;  thin prep Pap; no Hemoccult per Medicare Medicaid restrictions.               Patient needs be scheduled for colonoscopy; she needs be scheduled for bone density study.              Dr. Manuel Contreras recommend continuing on the previously prescribed VESIcare as well as trans vaginal estrogen therapy on indefinite basis.           Manuel Contreras recommends colonoscopy since it has been greater than 6 years according the patient for a colonoscope and she is had a history of breast cancer in the distant past..                        She is to continue using over-the-counter vitamin D3 at 2000 units daily.

## 2023-03-14 NOTE — PROGRESS NOTES
Subjective:       Patient ID: Chrissie Cunningham is a 72 y.o. female.    Chief Complaint: Medication Refill    Medication Refill  Associated symptoms include arthralgias and myalgias. Pertinent negatives include no abdominal pain, chest pain, fatigue or fever.   .  Patient complains of moderate pain to the left shoulder.  Stated has been going on for approximately 2 days.  She states that the pain is in the left shoulder her left arm and a radiates all the way down to her left wrist.  Patient states that she just stops taking the Lexapro approximately 3 or 4 days ago.  I told never to this stop taking the Lexapro we will have to wean the Lexapro.    Care gaps   Tetanus shot discussed with the patient she would get it at her pharmacist.    Current Medications:    Current Outpatient Medications:     diphenhydramine HCl (BENADRYL ALLERGY ORAL), Take by mouth., Disp: , Rfl:     estradioL (ESTRACE) 0.01 % (0.1 mg/gram) vaginal cream, Place 1 g vaginally twice a week., Disp: 1 each, Rfl: 2    pramipexole (MIRAPEX) 0.25 MG tablet, Take 1 tablet by mouth once daily., Disp: , Rfl:     acyclovir (ZOVIRAX) 400 MG tablet, Take 1 tablet (400 mg total) by mouth 2 (two) times daily., Disp: 180 tablet, Rfl: 3    alendronate (FOSAMAX) 70 MG tablet, Take 1 tablet (70 mg total) by mouth every 7 days. (Patient not taking: Reported on 3/14/2023), Disp: 12 tablet, Rfl: 3    atorvastatin (LIPITOR) 10 MG tablet, Take 1 tablet (10 mg total) by mouth once daily., Disp: 90 tablet, Rfl: 1    atorvastatin (LIPITOR) 20 MG tablet, , Disp: , Rfl:     cyclobenzaprine (FLEXERIL) 10 MG tablet, Take 1 tablet (10 mg total) by mouth 3 (three) times daily as needed for Muscle spasms., Disp: 30 tablet, Rfl: 0    EScitalopram oxalate (LEXAPRO) 10 MG tablet, Take 1 tablet (10 mg total) by mouth Every 3 (three) days. Take 1 tablet by mouth every 3 days for 2 weeks then STOP., Disp: 6 tablet, Rfl: 0    gabapentin (NEURONTIN) 300 MG capsule, Take 1 capsule (300 mg  total) by mouth 2 (two) times daily as needed., Disp: 60 capsule, Rfl: 3    HYDROcodone-acetaminophen (NORCO) 5-325 mg per tablet, Take 1 tablet by mouth every 4 (four) hours as needed for Pain. (Patient not taking: Reported on 11/15/2022), Disp: 12 tablet, Rfl: 0    hydrOXYzine HCL (ATARAX) 25 MG tablet, Take 1 tablet (25 mg total) by mouth daily as needed for Itching. (Patient not taking: Reported on 3/14/2023), Disp: 15 tablet, Rfl: 0    meloxicam (MOBIC) 7.5 MG tablet, Take 1 tablet (7.5 mg total) by mouth once daily., Disp: 30 tablet, Rfl: 1    naproxen (NAPROSYN) 500 MG tablet, Take 1 tablet (500 mg total) by mouth 2 (two) times daily with meals., Disp: 20 tablet, Rfl: 0    NIFEdipine (ADALAT CC) 60 MG TbSR, Take 1 tablet (60 mg total) by mouth once daily., Disp: 90 tablet, Rfl: 1    omeprazole (PRILOSEC) 40 MG capsule, Take 1 capsule (40 mg total) by mouth once daily., Disp: 90 capsule, Rfl: 1    solifenacin (VESICARE) 5 MG tablet, Take 1 tablet (5 mg total) by mouth once daily., Disp: 90 tablet, Rfl: 3           Review of Systems   Constitutional:  Negative for appetite change, fatigue and fever.   Respiratory:  Negative for shortness of breath.    Cardiovascular:  Negative for chest pain.   Gastrointestinal:  Negative for abdominal pain and constipation.   Endocrine: Negative for polydipsia, polyphagia and polyuria.   Genitourinary:  Negative for difficulty urinating, frequency and hot flashes.   Musculoskeletal:  Positive for arthralgias and myalgias.   Allergic/Immunologic: Negative for environmental allergies.   Neurological:  Negative for dizziness and light-headedness.   Psychiatric/Behavioral:  Negative for agitation.               Vitals:    03/14/23 0931 03/14/23 0937   BP: (!) 160/80 (!) 161/80   BP Location: Right arm Left arm   Patient Position: Sitting    BP Method: Large (Automatic)    Resp: 18    Temp: 97.7 °F (36.5 °C)    TempSrc: Temporal    SpO2: 97%    Weight: 83.2 kg (183 lb 6.4 oz)   "  Height: 5' 3" (1.6 m)         Physical Exam  Vitals and nursing note reviewed.   Constitutional:       Appearance: Normal appearance.   Cardiovascular:      Rate and Rhythm: Normal rate and regular rhythm.      Pulses: Normal pulses.      Heart sounds: Normal heart sounds.   Pulmonary:      Effort: Pulmonary effort is normal.      Breath sounds: Normal breath sounds.   Abdominal:      General: Abdomen is flat. Bowel sounds are normal.      Palpations: Abdomen is soft.   Musculoskeletal:         General: Normal range of motion.   Skin:     General: Skin is warm and dry.   Neurological:      General: No focal deficit present.      Mental Status: She is alert and oriented to person, place, and time. Mental status is at baseline.         Last Labs:     No visits with results within 1 Month(s) from this visit.   Latest known visit with results is:   Lab Visit on 11/21/2022   Component Date Value    Creatinine 11/21/2022 0.85     eGFR 11/21/2022 72        Last Imaging:  X-Ray Knee Complete 4 Or More Views Right  See Procedure Notes for results.     IMPRESSION: Please see Ortho procedure notes for report.      This procedure was auto-finalized by: Virtual Radiologist  X-Ray Hip 2 or 3 views Right (with Pelvis when performed)  See Procedure Notes for results.     IMPRESSION: Please see Ortho procedure notes for report.      This procedure was auto-finalized by: Virtual Radiologist         **Labs and x-rays personally reviewed by me    ** reviewed      Objective:        Assessment:       1. Osteoarthritis of shoulder, unspecified laterality, unspecified osteoarthritis type  X-Ray Shoulder 2 or More Views Left    X-Ray Humerus 2 View Left    X-Ray Wrist 2 View Left    Left shoulder.  Will x-ray her left shoulder today.      2. Hypertension, unspecified type  NIFEdipine (ADALAT CC) 60 MG TbSR      3. Gastroesophageal reflux disease without esophagitis  omeprazole (PRILOSEC) 40 MG capsule      4. Current mild episode of " major depressive disorder, unspecified whether recurrent      I have much improved, will wean the arm Lexapro.  Patient instructed to take it every other day for week then every 3 days for 1 week.      5. Dyslipidemia      Refill Lipitor           Plan:         [unfilled]

## 2023-03-14 NOTE — PATIENT INSTRUCTIONS
Dr. Manuel Contreras thanks you for this office visit at [facility].    Diagnosis for this visit:   Problem List Items Addressed This Visit          Renal/    OAB (overactive bladder) (Chronic)     Other Visit Diagnoses       Encounter for annual routine gynecological examination    -  Primary    Vaginitis, atrophic                 The Plan: Screening labs including CBC and CMP as well as urinalysis; thin prep Pap; no Hemoccult per Medicare Medicaid restrictions.                Patient needs be scheduled for colonoscopy; she needs be scheduled for bone density study.               Dr. Manuel Contreras recommend continuing on the previously prescribed VESIcare as well as trans vaginal estrogen therapy on indefinite basis.            Dr. Galloway recommends monthly breast self-exam; he does recommend yearly mammography due to the history of breast cancer in the distant  Past and he also recommends vitamin D3 2000 units daily.    Please practice best food and exercise habits for your age.    Dr. Manuel Contreras recommends avoidance of smoking and illicit medications or habits.    Please call  or schedule for any change in your health.     Please keep the next scheduled appointment or call for any need to change the appointment.     Dr. Manuel Contreras recommends yearly exams for good health maintenance.     Manuel Contreras recommends colonoscopy since it has been greater than 6 years according the patient for a colonoscope and she is had a history of breast cancer in the distant past..      Thank you    Dr. Manuel Contreras  03/14/2023 4:15 PM

## 2023-03-15 ENCOUNTER — TELEPHONE (OUTPATIENT)
Dept: FAMILY MEDICINE | Facility: CLINIC | Age: 73
End: 2023-03-15
Payer: MEDICARE

## 2023-03-15 NOTE — TELEPHONE ENCOUNTER
----- Message from Joey Wiley MD sent at 3/14/2023  2:16 PM CDT -----  Need to see in  1 week please  abnl results     [id number]- call made to pt; pt stated she will be out of town and will come to the next future appt 4/4/23

## 2023-03-16 LAB
GH SERPL-MCNC: NORMAL NG/ML
INSULIN SERPL-ACNC: NORMAL U[IU]/ML
LAB AP CLINICAL INFORMATION: NORMAL
LAB AP GYN INTERPRETATION: NEGATIVE
LAB AP PAP DISCLAIMER COMMENTS: NORMAL
RENIN PLAS-CCNC: NORMAL NG/ML/H

## 2023-03-27 ENCOUNTER — HOSPITAL ENCOUNTER (OUTPATIENT)
Dept: RADIOLOGY | Facility: HOSPITAL | Age: 73
Discharge: HOME | End: 2023-03-27
Attending: OBSTETRICS & GYNECOLOGY
Payer: MEDICARE

## 2023-03-27 DIAGNOSIS — M85.80 OSTEOPENIA, UNSPECIFIED LOCATION: ICD-10-CM

## 2023-03-27 DIAGNOSIS — M81.0 AGE-RELATED OSTEOPOROSIS WITHOUT CURRENT PATHOLOGICAL FRACTURE: ICD-10-CM

## 2023-03-27 PROCEDURE — 77080 DXA BONE DENSITY AXIAL: CPT | Mod: TC

## 2023-03-27 PROCEDURE — 77080 DXA BONE DENSITY AXIAL: CPT | Mod: 26,,, | Performed by: RADIOLOGY

## 2023-03-27 PROCEDURE — 77080 DXA BONE DENSITY AXIAL SKELETON 1 OR MORE SITES: ICD-10-PCS | Mod: 26,,, | Performed by: RADIOLOGY

## 2023-03-31 ENCOUNTER — OFFICE VISIT (OUTPATIENT)
Dept: FAMILY MEDICINE | Facility: CLINIC | Age: 73
End: 2023-03-31
Payer: MEDICARE

## 2023-03-31 VITALS
HEIGHT: 62 IN | BODY MASS INDEX: 32.76 KG/M2 | RESPIRATION RATE: 18 BRPM | OXYGEN SATURATION: 100 % | DIASTOLIC BLOOD PRESSURE: 88 MMHG | HEART RATE: 79 BPM | SYSTOLIC BLOOD PRESSURE: 171 MMHG | WEIGHT: 178 LBS | TEMPERATURE: 99 F

## 2023-03-31 DIAGNOSIS — T78.3XXA ANGIOEDEMA, INITIAL ENCOUNTER: Primary | ICD-10-CM

## 2023-03-31 PROCEDURE — 3079F PR MOST RECENT DIASTOLIC BLOOD PRESSURE 80-89 MM HG: ICD-10-PCS | Mod: ,,, | Performed by: NURSE PRACTITIONER

## 2023-03-31 PROCEDURE — 1159F MED LIST DOCD IN RCRD: CPT | Mod: ,,, | Performed by: NURSE PRACTITIONER

## 2023-03-31 PROCEDURE — 1159F PR MEDICATION LIST DOCUMENTED IN MEDICAL RECORD: ICD-10-PCS | Mod: ,,, | Performed by: NURSE PRACTITIONER

## 2023-03-31 PROCEDURE — 99212 OFFICE O/P EST SF 10 MIN: CPT | Mod: ,,, | Performed by: NURSE PRACTITIONER

## 2023-03-31 PROCEDURE — 3008F PR BODY MASS INDEX (BMI) DOCUMENTED: ICD-10-PCS | Mod: ,,, | Performed by: NURSE PRACTITIONER

## 2023-03-31 PROCEDURE — 3079F DIAST BP 80-89 MM HG: CPT | Mod: ,,, | Performed by: NURSE PRACTITIONER

## 2023-03-31 PROCEDURE — 99212 PR OFFICE/OUTPT VISIT, EST, LEVL II, 10-19 MIN: ICD-10-PCS | Mod: ,,, | Performed by: NURSE PRACTITIONER

## 2023-03-31 PROCEDURE — 3077F SYST BP >= 140 MM HG: CPT | Mod: ,,, | Performed by: NURSE PRACTITIONER

## 2023-03-31 PROCEDURE — 3077F PR MOST RECENT SYSTOLIC BLOOD PRESSURE >= 140 MM HG: ICD-10-PCS | Mod: ,,, | Performed by: NURSE PRACTITIONER

## 2023-03-31 PROCEDURE — 3008F BODY MASS INDEX DOCD: CPT | Mod: ,,, | Performed by: NURSE PRACTITIONER

## 2023-03-31 RX ORDER — BETAMETHASONE SODIUM PHOSPHATE AND BETAMETHASONE ACETATE 3; 3 MG/ML; MG/ML
6 INJECTION, SUSPENSION INTRA-ARTICULAR; INTRALESIONAL; INTRAMUSCULAR; SOFT TISSUE
Status: SHIPPED | OUTPATIENT
Start: 2023-03-31

## 2023-03-31 RX ORDER — METHYLPREDNISOLONE 4 MG/1
TABLET ORAL
Qty: 1 EACH | Refills: 0 | Status: SHIPPED | OUTPATIENT
Start: 2023-03-31 | End: 2023-04-21

## 2023-03-31 RX ORDER — FAMOTIDINE 20 MG/1
20 TABLET, FILM COATED ORAL 2 TIMES DAILY
Qty: 60 TABLET | Refills: 11 | Status: SHIPPED | OUTPATIENT
Start: 2023-03-31 | End: 2024-03-30

## 2023-03-31 RX ORDER — CETIRIZINE HYDROCHLORIDE 10 MG/1
10 TABLET ORAL DAILY
Qty: 30 TABLET | Refills: 11 | Status: SHIPPED | OUTPATIENT
Start: 2023-03-31 | End: 2024-03-30

## 2023-03-31 NOTE — PROGRESS NOTES
KAIDEN Choudhary   [facility] [address] [place] [address]  [phone]      PATIENT NAME: Chrissie Cunningham  : 1950  DATE: 3/31/23  MRN: [id number]      Billing Provider: KAIDEN Choudhary  Level of Service:   Patient PCP Information       Provider PCP Type    Manuel Contreras MD General            Reason for Visit / Chief Complaint: Facial Swelling (Pt states tues lips started swelling, cheeks and inside mouth. Tongue slightly swollen. Took Benadryl and inside of mouth got better. Pt states lips have continued to be swollen. Denies any SOB)       Update PCP  Update Chief Complaint         History of Present Illness / Problem Focused Workflow     Chrissie Cunningham presents to the clinic with Facial Swelling (Pt states tues lips started swelling, cheeks and inside mouth. Tongue slightly swollen. Took Benadryl and inside of mouth got better. Pt states lips have continued to be swollen. Denies any SOB)     Pt presents with swelling of the lips and inside of her mouth. She reports s/s began on Tuesday with swelling of lips, mouth and tongue. She took OTC benadryl with little improvement. She presents today for evaluation. She is typically followed by Dr Wiley.     Pt denies any change in medications, no new foods, no nuts, no change in toothpaste, mouth wash or lipsticks. Discussed with pt if this occurs again or worsens to go to er. She denies any difficulty swallowing, no shortness of breath or wheezing.     She had a similar episode in the past but was never able to identify the allergen. Will treat with steroids, H1/H2 blocker. She has an appt with Dr Wiley on Tuesday. Again stressed if worsens go to er.    At the time of my assessment her lips are mildly swollen, no oral cavity swelling. No tongue edema. Lungs are clear. Pt able to speak and swallow without difficulty.       Review of Systems     Review of  Systems   HENT:          Swelling of lips, mouth, tongue    Respiratory:  Negative for shortness of breath, wheezing and stridor.    Cardiovascular:  Negative for chest pain.      Medical / Social / Family History     Past Medical History:   Diagnosis Date    Arthritis     Benign lipomatous neoplasm of skin and subcutaneous tissue of other sites 2007    Left upper back lipoma - large - resected 2006 at [facility] by Dr. Church    BV (bacterial vaginosis) 2011    Cataract     Chronic fibrocystic breast disease (FCBD) in female 2007    chronic bilaterally    GERD (gastroesophageal reflux disease)     Hypertension     Keloid of skin 2020    probably from scratching;  left and right side of mons pubis    Neuropathy     OAB (overactive bladder) 2017    by Urodynamics Study    Osteopenia 2013    as noted on 10/2011    Spinal stenosis 10/08/2013    on the left on neck x-ray    Spondylosis 10/08/2013    Stress incontinence, female 2017    by Urodynamics Study    Urinary incontinence, mixed 2017    by Urodynamics Study       Past Surgical History:   Procedure Laterality Date    BREAST BIOPSY       SECTION      CYST REMOVAL      EYE SURGERY      HYSTERECTOMY      OOPHORECTOMY      TRIGGER FINGER RELEASE Right 2022    Procedure: RELEASE, TRIGGER FINGER, THUMB;  Surgeon: Jayant Mandujano III, MD;  Location: [facility];  Service: Orthopedics;  Laterality: Right;       Social History  Ms. Cunningham  reports that she has never smoked. She has never used smokeless tobacco. She reports that she does not drink alcohol and does not use drugs.    Family History  Ms. Cunningham's family history includes Breast cancer in her sister and sister; Diabetes in her father; Hypertension in her father and mother; Prostate cancer in her father.    Medications and Allergies     Medications  Outpatient Medications Marked as Taking for the 3/31/23 encounter (Office Visit) with Karime  LUISITO [facility]   Medication Sig Dispense Refill    acyclovir (ZOVIRAX) 400 MG tablet Take 1 tablet (400 mg total) by mouth 2 (two) times daily. 180 tablet 3    alendronate (FOSAMAX) 70 MG tablet Take 1 tablet (70 mg total) by mouth every 7 days. 12 tablet 3    atorvastatin (LIPITOR) 10 MG tablet Take 1 tablet (10 mg total) by mouth once daily. 90 tablet 1    cyclobenzaprine (FLEXERIL) 10 MG tablet Take 1 tablet (10 mg total) by mouth 3 (three) times daily as needed for Muscle spasms. 30 tablet 0    diphenhydramine HCl (BENADRYL ALLERGY ORAL) Take by mouth.      EScitalopram oxalate (LEXAPRO) 10 MG tablet Take 1 tablet (10 mg total) by mouth Every 3 (three) days. Take 1 tablet by mouth every 3 days for 2 weeks then STOP. 6 tablet 0    estradioL (ESTRACE) 0.01 % (0.1 mg/gram) vaginal cream Place 1 g vaginally twice a week. 1 each 2    gabapentin (NEURONTIN) 300 MG capsule Take 1 capsule (300 mg total) by mouth 2 (two) times daily as needed. 60 capsule 3    HYDROcodone-acetaminophen (NORCO) 5-325 mg per tablet Take 1 tablet by mouth every 4 (four) hours as needed for Pain. 12 tablet 0    hydrOXYzine HCL (ATARAX) 25 MG tablet Take 1 tablet (25 mg total) by mouth daily as needed for Itching. 15 tablet 0    meloxicam (MOBIC) 7.5 MG tablet Take 1 tablet (7.5 mg total) by mouth once daily. 30 tablet 1    naproxen (NAPROSYN) 500 MG tablet Take 1 tablet (500 mg total) by mouth 2 (two) times daily with meals. 20 tablet 0    NIFEdipine (ADALAT CC) 60 MG TbSR Take 1 tablet (60 mg total) by mouth once daily. 90 tablet 1    omeprazole (PRILOSEC) 40 MG capsule Take 1 capsule (40 mg total) by mouth once daily. 90 capsule 1    pramipexole (MIRAPEX) 0.25 MG tablet Take 1 tablet by mouth once daily.      solifenacin (VESICARE) 5 MG tablet Take 1 tablet (5 mg total) by mouth once daily. 90 tablet 3     Current Facility-Administered Medications for the 3/31/23 encounter (Office Visit) with KAIDEN Choudhary   Medication Dose Route  Frequency Provider Last Rate Last Admin    betamethasone acetate-betamethasone sodium phosphate injection 6 mg  6 mg Intramuscular 1 time in Clinic/HOD KAIDEN Choudhary           Allergies  Review of patient's allergies indicates:   Allergen Reactions    Penicillins     Hydrocodone-acetaminophen Nausea And Vomiting     Other reaction(s): Unknown       Physical Examination     Vitals:    03/31/23 1040   BP: (!) 171/88   Pulse: 79   Resp: 18   Temp: 99 °F (37.2 °C)     Physical Exam  Constitutional:       General: She is not in acute distress.  HENT:      Head:      Salivary Glands: Right salivary gland is not diffusely enlarged. Left salivary gland is not diffusely enlarged.      Mouth/Throat:      Mouth: Angioedema present.      Comments: Mild swelling of lips noted. No tongue swelling or oral cavity swelling.   Eyes:      Pupils: Pupils are equal, round, and reactive to light.   Cardiovascular:      Rate and Rhythm: Normal rate and regular rhythm.      Heart sounds: Normal heart sounds. No murmur heard.  Pulmonary:      Breath sounds: Normal breath sounds. No wheezing, rhonchi or rales.   Abdominal:      General: Bowel sounds are normal.   Musculoskeletal:         General: No swelling.      Cervical back: Normal range of motion and neck supple.   Skin:     General: Skin is warm and dry.   Neurological:      Mental Status: She is alert and oriented to person, place, and time.        Assessment and Plan (including Health Maintenance)      Problem List  Smart Sets  Document Outside HM   :    Plan:   1. Angioedema, initial encounter  Comments:  steroids, H1/H2 blocker  follow up in er if not better or worsens  follow up with PCP as scheduled for next week   Orders:  -     famotidine (PEPCID) 20 MG tablet; Take 1 tablet (20 mg total) by mouth 2 (two) times daily.  Dispense: 60 tablet; Refill: 11  -     cetirizine (ZYRTEC) 10 MG tablet; Take 1 tablet (10 mg total) by mouth once daily.  Dispense: 30 tablet; Refill:  11  -     methylPREDNISolone (MEDROL DOSEPACK) 4 mg tablet; use as directed  Dispense: 1 each; Refill: 0  -     betamethasone acetate-betamethasone sodium phosphate injection 6 mg           Health Maintenance Due   Topic Date Due    TETANUS VACCINE  Never done    COVID-19 Vaccine (4 - Booster for Moderna series) 12/21/2021       Problem List Items Addressed This Visit    None  Visit Diagnoses       Angioedema, initial encounter    -  Primary    steroids, H1/H2 blocker  follow up in er if not better or worsens  follow up with PCP as scheduled for next week     Relevant Medications    famotidine (PEPCID) 20 MG tablet    cetirizine (ZYRTEC) 10 MG tablet    methylPREDNISolone (MEDROL DOSEPACK) 4 mg tablet    betamethasone acetate-betamethasone sodium phosphate injection 6 mg            Health Maintenance Topics with due status: Not Due       Topic Last Completion Date    Lipid Panel 06/14/2022    Colorectal Cancer Screening 06/23/2022    Mammogram 09/20/2022    DEXA Scan 03/27/2023       Future Appointments   Date Time Provider Department Center   4/4/2023  9:15 AM Joey Wiley MD [facility]   5/17/2023 10:00 AM Manuel Contreras MD [facility]   6/16/2023 10:00 AM AWV NURSE, [facility]   9/13/2023 10:45 AM Manuel Contreras MD [facility]   9/26/2023  9:45 AM [facility] [place]   10/19/2023 10:00 AM KAIDEN Palacio ROSALVA ANASTACIA [facility]            Signature:  [facility]ary [facility] [address] [place] [address]  [phone]    Date of encounter: 3/31/23

## 2023-04-04 ENCOUNTER — OFFICE VISIT (OUTPATIENT)
Dept: FAMILY MEDICINE | Facility: CLINIC | Age: 73
End: 2023-04-04
Payer: MEDICARE

## 2023-04-04 VITALS
OXYGEN SATURATION: 99 % | RESPIRATION RATE: 18 BRPM | BODY MASS INDEX: 32.42 KG/M2 | WEIGHT: 176.19 LBS | HEART RATE: 65 BPM | SYSTOLIC BLOOD PRESSURE: 132 MMHG | DIASTOLIC BLOOD PRESSURE: 72 MMHG | HEIGHT: 62 IN | TEMPERATURE: 98 F

## 2023-04-04 DIAGNOSIS — M19.019 OSTEOARTHRITIS OF SHOULDER, UNSPECIFIED LATERALITY, UNSPECIFIED OSTEOARTHRITIS TYPE: Primary | ICD-10-CM

## 2023-04-04 DIAGNOSIS — T78.3XXA ANGIOEDEMA, INITIAL ENCOUNTER: ICD-10-CM

## 2023-04-04 DIAGNOSIS — E66.9 CLASS 1 OBESITY WITH BODY MASS INDEX (BMI) OF 32.0 TO 32.9 IN ADULT, UNSPECIFIED OBESITY TYPE, UNSPECIFIED WHETHER SERIOUS COMORBIDITY PRESENT: ICD-10-CM

## 2023-04-04 PROBLEM — E66.811 CLASS 1 OBESITY WITH BODY MASS INDEX (BMI) OF 32.0 TO 32.9 IN ADULT: Status: ACTIVE | Noted: 2021-12-20

## 2023-04-04 PROCEDURE — 1126F AMNT PAIN NOTED NONE PRSNT: CPT | Mod: ,,, | Performed by: INTERNAL MEDICINE

## 2023-04-04 PROCEDURE — 1101F PT FALLS ASSESS-DOCD LE1/YR: CPT | Mod: ,,, | Performed by: INTERNAL MEDICINE

## 2023-04-04 PROCEDURE — 3075F SYST BP GE 130 - 139MM HG: CPT | Mod: ,,, | Performed by: INTERNAL MEDICINE

## 2023-04-04 PROCEDURE — 3075F PR MOST RECENT SYSTOLIC BLOOD PRESS GE 130-139MM HG: ICD-10-PCS | Mod: ,,, | Performed by: INTERNAL MEDICINE

## 2023-04-04 PROCEDURE — 3008F BODY MASS INDEX DOCD: CPT | Mod: ,,, | Performed by: INTERNAL MEDICINE

## 2023-04-04 PROCEDURE — 99214 OFFICE O/P EST MOD 30 MIN: CPT | Mod: ,,, | Performed by: INTERNAL MEDICINE

## 2023-04-04 PROCEDURE — 99214 PR OFFICE/OUTPT VISIT, EST, LEVL IV, 30-39 MIN: ICD-10-PCS | Mod: ,,, | Performed by: INTERNAL MEDICINE

## 2023-04-04 PROCEDURE — 3288F PR FALLS RISK ASSESSMENT DOCUMENTED: ICD-10-PCS | Mod: ,,, | Performed by: INTERNAL MEDICINE

## 2023-04-04 PROCEDURE — 1159F MED LIST DOCD IN RCRD: CPT | Mod: ,,, | Performed by: INTERNAL MEDICINE

## 2023-04-04 PROCEDURE — 1159F PR MEDICATION LIST DOCUMENTED IN MEDICAL RECORD: ICD-10-PCS | Mod: ,,, | Performed by: INTERNAL MEDICINE

## 2023-04-04 PROCEDURE — 3008F PR BODY MASS INDEX (BMI) DOCUMENTED: ICD-10-PCS | Mod: ,,, | Performed by: INTERNAL MEDICINE

## 2023-04-04 PROCEDURE — 1160F RVW MEDS BY RX/DR IN RCRD: CPT | Mod: ,,, | Performed by: INTERNAL MEDICINE

## 2023-04-04 PROCEDURE — 1160F PR REVIEW ALL MEDS BY PRESCRIBER/CLIN PHARMACIST DOCUMENTED: ICD-10-PCS | Mod: ,,, | Performed by: INTERNAL MEDICINE

## 2023-04-04 PROCEDURE — 3078F PR MOST RECENT DIASTOLIC BLOOD PRESSURE < 80 MM HG: ICD-10-PCS | Mod: ,,, | Performed by: INTERNAL MEDICINE

## 2023-04-04 PROCEDURE — 3288F FALL RISK ASSESSMENT DOCD: CPT | Mod: ,,, | Performed by: INTERNAL MEDICINE

## 2023-04-04 PROCEDURE — 1101F PR PT FALLS ASSESS DOC 0-1 FALLS W/OUT INJ PAST YR: ICD-10-PCS | Mod: ,,, | Performed by: INTERNAL MEDICINE

## 2023-04-04 PROCEDURE — 1126F PR PAIN SEVERITY QUANTIFIED, NO PAIN PRESENT: ICD-10-PCS | Mod: ,,, | Performed by: INTERNAL MEDICINE

## 2023-04-04 PROCEDURE — 3078F DIAST BP <80 MM HG: CPT | Mod: ,,, | Performed by: INTERNAL MEDICINE

## 2023-04-04 NOTE — PROGRESS NOTES
Care gaps discussed with patient today. Patient is due for tetanus vaccine. Due to patient insurance, patient will need to receive vaccine at pharmacy. Patient refused Shingles and Pneumonia vaccine. Patient has mammogram scheduled for 9/26/2023.

## 2023-04-04 NOTE — PROGRESS NOTES
Subjective:       Patient ID: Chrissie Cunningham is a 72 y.o. female.    Chief Complaint: Follow-up (3 weeks)    Follow-up  Pertinent negatives include no abdominal pain, chest pain, fatigue or fever.   .  Patient seen and examined patient complains of oral swelling state that she is had an allergic reaction approximately 2 weeks ago stated that she had lips swelling her tongue was swelling and she does not know the cause of it.  She does take Norco intermittently and she does have opiates listed as 1 of her medications.  She also takes several other medications which are not needed at this time therefore I am going to DC acyclovir, Lexapro, Mobic, Naprosyn and patient informed she should not take Norco.  Also it appears that she does have angioedema therefore I will check a C1 inhibitor.    Current Medications:    Current Outpatient Medications:     alendronate (FOSAMAX) 70 MG tablet, Take 1 tablet (70 mg total) by mouth every 7 days., Disp: 12 tablet, Rfl: 3    atorvastatin (LIPITOR) 10 MG tablet, Take 1 tablet (10 mg total) by mouth once daily., Disp: 90 tablet, Rfl: 1    atorvastatin (LIPITOR) 20 MG tablet, , Disp: , Rfl:     cetirizine (ZYRTEC) 10 MG tablet, Take 1 tablet (10 mg total) by mouth once daily., Disp: 30 tablet, Rfl: 11    cyclobenzaprine (FLEXERIL) 10 MG tablet, Take 1 tablet (10 mg total) by mouth 3 (three) times daily as needed for Muscle spasms., Disp: 30 tablet, Rfl: 0    diphenhydramine HCl (BENADRYL ALLERGY ORAL), Take by mouth., Disp: , Rfl:     estradioL (ESTRACE) 0.01 % (0.1 mg/gram) vaginal cream, Place 1 g vaginally twice a week., Disp: 1 each, Rfl: 2    famotidine (PEPCID) 20 MG tablet, Take 1 tablet (20 mg total) by mouth 2 (two) times daily., Disp: 60 tablet, Rfl: 11    gabapentin (NEURONTIN) 300 MG capsule, Take 1 capsule (300 mg total) by mouth 2 (two) times daily as needed., Disp: 60 capsule, Rfl: 3    HYDROcodone-acetaminophen (NORCO) 5-325 mg per tablet, Take 1 tablet by mouth every 4  "(four) hours as needed for Pain., Disp: 12 tablet, Rfl: 0    hydrOXYzine HCL (ATARAX) 25 MG tablet, Take 1 tablet (25 mg total) by mouth daily as needed for Itching., Disp: 15 tablet, Rfl: 0    methylPREDNISolone (MEDROL DOSEPACK) 4 mg tablet, use as directed, Disp: 1 each, Rfl: 0    NIFEdipine (ADALAT CC) 60 MG TbSR, Take 1 tablet (60 mg total) by mouth once daily., Disp: 90 tablet, Rfl: 1    omeprazole (PRILOSEC) 40 MG capsule, Take 1 capsule (40 mg total) by mouth once daily., Disp: 90 capsule, Rfl: 1    pramipexole (MIRAPEX) 0.25 MG tablet, Take 1 tablet by mouth once daily., Disp: , Rfl:     solifenacin (VESICARE) 5 MG tablet, Take 1 tablet (5 mg total) by mouth once daily., Disp: 90 tablet, Rfl: 3    Current Facility-Administered Medications:     betamethasone acetate-betamethasone sodium phosphate injection 6 mg, 6 mg, Intramuscular, 1 time in Clinic/HOD, KAIDEN Choudhary           Review of Systems   Constitutional:  Negative for appetite change, fatigue and fever.   HENT:          Facial swelling   Respiratory:  Negative for shortness of breath.    Cardiovascular:  Negative for chest pain.   Gastrointestinal:  Negative for abdominal pain and constipation.   Endocrine: Negative for polydipsia, polyphagia and polyuria.   Genitourinary:  Negative for difficulty urinating, frequency and hot flashes.   Allergic/Immunologic: Negative for environmental allergies.   Neurological:  Negative for dizziness and light-headedness.   Psychiatric/Behavioral:  Negative for agitation.               Vitals:    04/04/23 0926   BP: 132/72   BP Location: Right arm   Patient Position: Sitting   BP Method: Large (Automatic)   Pulse: 65   Resp: 18   Temp: 97.8 °F (36.6 °C)   TempSrc: Temporal   SpO2: 99%   Weight: 79.9 kg (176 lb 3.2 oz)   Height: 5' 2" (1.575 m)        Physical Exam  Vitals and nursing note reviewed.   Constitutional:       Appearance: Normal appearance. She is obese.   Cardiovascular:      Rate and Rhythm: " Normal rate and regular rhythm.      Pulses: Normal pulses.      Heart sounds: Normal heart sounds.   Pulmonary:      Effort: Pulmonary effort is normal.      Breath sounds: Normal breath sounds.   Abdominal:      General: Abdomen is flat. Bowel sounds are normal.      Palpations: Abdomen is soft.   Musculoskeletal:         General: Normal range of motion.   Skin:     General: Skin is warm and dry.   Neurological:      General: No focal deficit present.      Mental Status: She is alert and oriented to person, place, and time. Mental status is at baseline.         Last Labs:     Lab Visit on 03/14/2023   Component Date Value    Sodium 03/14/2023 140     Potassium 03/14/2023 3.8     Chloride 03/14/2023 106     CO2 03/14/2023 32     Anion Gap 03/14/2023 6 (L)     Glucose 03/14/2023 84     BUN 03/14/2023 14     Creatinine 03/14/2023 0.90     BUN/Creatinine Ratio 03/14/2023 16     Calcium 03/14/2023 9.0     Total Protein 03/14/2023 7.1     Albumin 03/14/2023 3.7     Globulin 03/14/2023 3.4     A/G Ratio 03/14/2023 1.1     Bilirubin, Total 03/14/2023 0.3     Alk Phos 03/14/2023 65     ALT 03/14/2023 17     AST 03/14/2023 25     eGFR 03/14/2023 67     Vitamin D 25-Hydroxy, Bl* 03/14/2023 26.3     WBC 03/14/2023 4.71     RBC 03/14/2023 4.43     Hemoglobin 03/14/2023 13.2     Hematocrit 03/14/2023 40.0     MCV 03/14/2023 90.3     MCH 03/14/2023 29.8     MCHC 03/14/2023 33.0     RDW 03/14/2023 12.7     Platelet Count 03/14/2023 183     MPV 03/14/2023 13.4 (H)     Neutrophils % 03/14/2023 52.9 (L)     Lymphocytes % 03/14/2023 33.8     Monocytes % 03/14/2023 9.3 (H)     Eosinophils % 03/14/2023 3.0     Basophils % 03/14/2023 0.8     Immature Granulocytes % 03/14/2023 0.2     nRBC, Auto 03/14/2023 0.0     Neutrophils, Abs 03/14/2023 2.49     Lymphocytes, Absolute 03/14/2023 1.59     Monocytes, Absolute 03/14/2023 0.44     Eosinophils, Absolute 03/14/2023 0.14     Basophils, Absolute 03/14/2023 0.04     Immature Granulocytes, A*  03/14/2023 0.01     nRBC, Absolute 03/14/2023 0.00     Diff Type 03/14/2023 Scan Smear     Platelet Morphology 03/14/2023 Platelet Clumping (A)     RBC Morphology 03/14/2023 Normal    Office Visit on 03/14/2023   Component Date Value    Color, UA 03/14/2023 Light-Yellow     Clarity, UA 03/14/2023 Clear     pH, UA 03/14/2023 6.0     Leukocytes, UA 03/14/2023 Negative     Nitrites, UA 03/14/2023 Negative     Protein, UA 03/14/2023 10 (A)     Glucose, UA 03/14/2023 Normal     Ketones, UA 03/14/2023 Negative     Urobilinogen, UA 03/14/2023 Normal     Bilirubin, UA 03/14/2023 Negative     Blood, UA 03/14/2023 Negative     Specific Gravity, UA 03/14/2023 1.018     Case Report 03/14/2023                      Value:Pap Cytology                                      Case: [id number]                                   Authorizing Provider:  Manuel Contreras MD         Collected:           03/14/2023 04:24 PM          Ordering Location:     [facility] Received:            03/15/2023 09:02 AM                                 - Obstetrics And                                                                                    Gynecology                                                                   First Screen:          DAGO Bailon(ASCP)                                                        Specimen:    Liquid-Based Pap Test, Screening, Vagina                                                   Interpretation 03/14/2023 Negative     General Categorization 03/14/2023 Negative for intraepithelial lesion or malignancy     Specimen Adequacy 03/14/2023 Satisfactory for evaluation     Clinical Information 03/14/2023                      Value:This result contains rich text formatting which cannot be displayed here.    Disclaimer 03/14/2023                      Value:This result contains rich text formatting which cannot be displayed here.    WBC, UA 03/14/2023 2     RBC, UA 03/14/2023 1     Squamous Epithelial Cell*  03/14/2023 Occasional (A)     Mucous 03/14/2023 Occasional (A)        Last Imaging:  DXA Bone Density Axial Skeleton 1 or more sites  Narrative: EXAMINATION:  DXA BONE DENSITY AXIAL SKELETON 1 OR MORE SITES    CLINICAL HISTORY:  Other specified disorders of bone density and structure, unspecified site    TECHNIQUE:  Bone densitometry performed    COMPARISON:  None available    FINDINGS:  Vertebral body T score measurements are estimated at -0.8    Femoral neck T score measurements were estimated at -1.0    Estimated 10 year fracture rate 10%  Impression: Measurements fall within the osteopenia range  increased fracture risk.    Electronically signed by: Julito Page  Date:    03/27/2023  Time:    10:25         **Labs and x-rays personally reviewed by me    ** reviewed      Objective:        Assessment:       1. Osteoarthritis of shoulder, unspecified laterality, unspecified osteoarthritis type  Tylenol p.r.n.      2. Angioedema, initial encounter  Ambulatory referral/consult to Allergy, check C1 inhibitor      3. Class 1 obesity with body mass index (BMI) of 32.0 to 32.9 in adult, unspecified obesity type, unspecified whether serious comorbidity present  Recommend diet exercise and lifestyle modification           Plan:         [unfilled]

## 2023-05-16 ENCOUNTER — OFFICE VISIT (OUTPATIENT)
Dept: OBSTETRICS AND GYNECOLOGY | Facility: CLINIC | Age: 73
End: 2023-05-16
Payer: MEDICARE

## 2023-05-16 VITALS
HEIGHT: 62 IN | BODY MASS INDEX: 32.35 KG/M2 | DIASTOLIC BLOOD PRESSURE: 74 MMHG | TEMPERATURE: 99 F | RESPIRATION RATE: 17 BRPM | SYSTOLIC BLOOD PRESSURE: 135 MMHG | OXYGEN SATURATION: 98 % | HEART RATE: 74 BPM | WEIGHT: 175.81 LBS

## 2023-05-16 DIAGNOSIS — N95.2 VAGINITIS, ATROPHIC: Primary | ICD-10-CM

## 2023-05-16 DIAGNOSIS — N32.81 OAB (OVERACTIVE BLADDER): ICD-10-CM

## 2023-05-16 DIAGNOSIS — E55.9 VITAMIN D DEFICIENCY: ICD-10-CM

## 2023-05-16 DIAGNOSIS — M85.80 OSTEOPENIA, UNSPECIFIED LOCATION: ICD-10-CM

## 2023-05-16 PROCEDURE — 99214 OFFICE O/P EST MOD 30 MIN: CPT | Mod: S$PBB,,, | Performed by: OBSTETRICS & GYNECOLOGY

## 2023-05-16 PROCEDURE — 3075F PR MOST RECENT SYSTOLIC BLOOD PRESS GE 130-139MM HG: ICD-10-PCS | Mod: CPTII,,, | Performed by: OBSTETRICS & GYNECOLOGY

## 2023-05-16 PROCEDURE — 3078F PR MOST RECENT DIASTOLIC BLOOD PRESSURE < 80 MM HG: ICD-10-PCS | Mod: CPTII,,, | Performed by: OBSTETRICS & GYNECOLOGY

## 2023-05-16 PROCEDURE — 1159F MED LIST DOCD IN RCRD: CPT | Mod: CPTII,,, | Performed by: OBSTETRICS & GYNECOLOGY

## 2023-05-16 PROCEDURE — 1160F RVW MEDS BY RX/DR IN RCRD: CPT | Mod: CPTII,,, | Performed by: OBSTETRICS & GYNECOLOGY

## 2023-05-16 PROCEDURE — 3008F BODY MASS INDEX DOCD: CPT | Mod: CPTII,,, | Performed by: OBSTETRICS & GYNECOLOGY

## 2023-05-16 PROCEDURE — 99214 PR OFFICE/OUTPT VISIT, EST, LEVL IV, 30-39 MIN: ICD-10-PCS | Mod: S$PBB,,, | Performed by: OBSTETRICS & GYNECOLOGY

## 2023-05-16 PROCEDURE — 3008F PR BODY MASS INDEX (BMI) DOCUMENTED: ICD-10-PCS | Mod: CPTII,,, | Performed by: OBSTETRICS & GYNECOLOGY

## 2023-05-16 PROCEDURE — 99215 OFFICE O/P EST HI 40 MIN: CPT | Mod: PBBFAC | Performed by: OBSTETRICS & GYNECOLOGY

## 2023-05-16 PROCEDURE — 3078F DIAST BP <80 MM HG: CPT | Mod: CPTII,,, | Performed by: OBSTETRICS & GYNECOLOGY

## 2023-05-16 PROCEDURE — 1159F PR MEDICATION LIST DOCUMENTED IN MEDICAL RECORD: ICD-10-PCS | Mod: CPTII,,, | Performed by: OBSTETRICS & GYNECOLOGY

## 2023-05-16 PROCEDURE — 1160F PR REVIEW ALL MEDS BY PRESCRIBER/CLIN PHARMACIST DOCUMENTED: ICD-10-PCS | Mod: CPTII,,, | Performed by: OBSTETRICS & GYNECOLOGY

## 2023-05-16 PROCEDURE — 3075F SYST BP GE 130 - 139MM HG: CPT | Mod: CPTII,,, | Performed by: OBSTETRICS & GYNECOLOGY

## 2023-05-16 RX ORDER — ALENDRONATE SODIUM 70 MG/1
70 TABLET ORAL
Qty: 12 TABLET | Refills: 3 | Status: SHIPPED | OUTPATIENT
Start: 2023-05-16 | End: 2024-05-15

## 2023-05-16 NOTE — PATIENT INSTRUCTIONS
Dr. Manuel Contreras thanks you for this office visit at [facility].    Diagnosis for this visit:   Problem List Items Addressed This Visit          Renal/    OAB (overactive bladder) (Chronic)     Other Visit Diagnoses       Vaginitis, atrophic    -  Primary    Vitamin D deficiency        Osteopenia, unspecified location                 The Plan:   Continue on VESIcare; continue on once a week topical vaginal estrogen supplementation - Dr. Contreras checked and the VESIcare as well as topical estrogen will not need to be renewed until March of 2024.; Dr. Manuel Contreras did renew the alendronate treat osteopenia.;  increase to vitamin-D 4000 units daily.               Since the patient is not sexually active and she is serial normal Pap smears, Dr. Manuel Contreras does recommend no further Pap smears of the vaginal vault.            Best advice the patient is colonoscopy, monthly breast self-exam and return next year.        Please practice best food and exercise habits for your age.    Dr. Manuel Contreras recommends avoidance of smoking and illicit medications or habits.    Please call  or schedule for any change in your health.     Please keep the next scheduled appointment or call for any need to change the appointment.     Dr. Manuel Contreras recommends yearly exams for good health maintenance.      Thank you    Dr. Manuel Contreras  05/16/2023 10:10 AM

## 2023-05-16 NOTE — PROGRESS NOTES
Chrissie Cunningham female  for   Chief Complaint   Patient presents with    Follow-up     6 month follow up OBA and Atrophic vaginitis     Follow-up on recent labs          PHI:  Patient is here for follow-up.  Her Pap smear in 2023 was normal and negative for pathology.  Her labs were reviewed and centrally within normal limits.  Her vitamin-D level is slightly low at 26 ng/mL.  She is on vitamin D3 2000 units daily.  Her urinalysis was unremarkable.    She is doing very well with treatment overactive bladder on VESIcare.  Patient is also using topical vaginal estrogen finger applied once a week without problems.  She has asymptomatic atrophic vaginitis.    Patient is not sexually active.    Past Medical History:   Diagnosis Date    Arthritis     Benign lipomatous neoplasm of skin and subcutaneous tissue of other sites 2007    Left upper back lipoma - large - resected 2006 at [facility] by Dr. Church    BV (bacterial vaginosis) 2011    Cataract     Chronic fibrocystic breast disease (FCBD) in female 2007    chronic bilaterally    GERD (gastroesophageal reflux disease)     Hypertension     Keloid of skin 2020    probably from scratching;  left and right side of mons pubis    Neuropathy     OAB (overactive bladder) 2017    by Urodynamics Study    Osteopenia 2013    as noted on 10/2011    Spinal stenosis 10/08/2013    on the left on neck x-ray    Spondylosis 10/08/2013    Stress incontinence, female 2017    by Urodynamics Study    Urinary incontinence, mixed 2017    by Urodynamics Study      Past Surgical History:   Procedure Laterality Date    BREAST BIOPSY       SECTION      CYST REMOVAL      EYE SURGERY      HYSTERECTOMY      OOPHORECTOMY      TRIGGER FINGER RELEASE Right 2022    Procedure: RELEASE, TRIGGER FINGER, THUMB;  Surgeon: Jayant Mandujano III, MD;  Location: [facility];  Service: Orthopedics;  Laterality: Right;      Review of  "patient's allergies indicates:   Allergen Reactions    Penicillins     Hydrocodone-acetaminophen Nausea And Vomiting     Other reaction(s): Unknown        ROS:Pertinent items are noted in HPI.    Physical exam:    /74 (BP Location: Left arm, Patient Position: Sitting)   Pulse 74   Temp 98.7 °F (37.1 °C) (Oral)   Resp 17   Ht 5' 2" (1.575 m)   Wt 79.7 kg (175 lb 12.8 oz)   SpO2 98%   BMI 32.15 kg/m²      General Appearance: healthy, alert, no distress, smiling;   Walker the use of a cane on her right leg.  Chest:           Lungs: clear to auscultation bilaterally           Heart: regular rate and rhythm, S1, S2 normal, no murmur, click, rub or gallop    Abdomen:  Soft abdomen with normal bowel sounds and well healed hypogastric midline incision with no evidence of ventral hernia.  There has been no interval change during the last several exams.      Pelvic:  Normal vulva; vaginal walls appear atrophic but stable and there is no evidence of any vaginal discharge and she has excellent vaginal vault support.  There is no bladder base tenderness.  She has good urethral support.    Extremity: normal; no CVA tenderness    Skin: normal exam        Assessment:   Problem List Items Addressed This Visit          Renal/    OAB (overactive bladder) (Chronic)     Other Visit Diagnoses       Vaginitis, atrophic    -  Primary    Vitamin D deficiency        Osteopenia, unspecified location                 Plan:  Continue on VESIcare; continue on once a week topical vaginal estrogen supplementation - Dr. Contreras checked and the VESIcare as well as topical estrogen will not need to be renewed until March of 2024.; Dr. Manuel Contreras did renew the alendronate treat osteopenia.;  increase to vitamin-D 4000 units daily.               Since the patient is not sexually active and she is serial normal Pap smears, Dr. Manuel Contreras does recommend no further Pap smears of the vaginal vault.            Best advice the patient is " colonoscopy, monthly breast self-exam and return next year.

## 2023-06-08 RX ORDER — ESCITALOPRAM OXALATE 10 MG/1
TABLET ORAL
Qty: 90 TABLET | Refills: 0 | OUTPATIENT
Start: 2023-06-08

## 2023-06-26 NOTE — PROGRESS NOTES
[facility]     PATIENT NAME: Chrissie Cunningham   : 1950    AGE: 72 y.o. DATE: 2023   MRN: [id number]        Reason for Visit / Chief Complaint: Medicare AWV ([facility] Medicare AWV visit )        Chrissie Cunningham presents for a [facility] Medicare AWV today.    ROS     The following components were reviewed and updated:      Medical/Social/Family History:  Past Medical History:   Diagnosis Date    Arthritis     Benign lipomatous neoplasm of skin and subcutaneous tissue of other sites 2007    Left upper back lipoma - large - resected 2006 at [facility] by Dr. Ranjit CASTRO (bacterial vaginosis) 2011    Cataract     Chronic fibrocystic breast disease (FCBD) in female 2007    chronic bilaterally    GERD (gastroesophageal reflux disease)     Hypertension     Keloid of skin 2020    probably from scratching;  left and right side of mons pubis    Neuropathy     OAB (overactive bladder) 2017    by Urodynamics Study    Osteopenia 2013    as noted on 10/2011    Spinal stenosis 10/08/2013    on the left on neck x-ray    Spondylosis 10/08/2013    Stress incontinence, female 2017    by Urodynamics Study    Urinary incontinence, mixed 2017    by Urodynamics Study        Family History   Problem Relation Age of Onset    Prostate cancer Father     Diabetes Father     Hypertension Father     Hypertension Mother     Breast cancer Sister     Breast cancer Sister     Hyperlipidemia Neg Hx     Stroke Neg Hx         Past Surgical History:   Procedure Laterality Date    BREAST BIOPSY       SECTION      CYST REMOVAL      EYE SURGERY      HYSTERECTOMY      OOPHORECTOMY      TRIGGER FINGER RELEASE Right 2022    Procedure: RELEASE, TRIGGER FINGER, THUMB;  Surgeon: Jayant Mandujano III, MD;  Location: [facility];  Service: Orthopedics;  Laterality: Right;       Social History     Tobacco Use   Smoking Status Never     Passive exposure: Never   Smokeless Tobacco Never       Social History     Substance and Sexual Activity   Alcohol Use Never         Allergies and Current Medications     Review of patient's allergies indicates:   Allergen Reactions    Penicillins     Hydrocodone-acetaminophen Nausea And Vomiting     Other reaction(s): Unknown       Current Outpatient Medications:     alendronate (FOSAMAX) 70 MG tablet, Take 1 tablet (70 mg total) by mouth every 7 days., Disp: 12 tablet, Rfl: 3    atorvastatin (LIPITOR) 10 MG tablet, Take 1 tablet (10 mg total) by mouth once daily., Disp: 90 tablet, Rfl: 1    cetirizine (ZYRTEC) 10 MG tablet, Take 1 tablet (10 mg total) by mouth once daily., Disp: 30 tablet, Rfl: 11    cyclobenzaprine (FLEXERIL) 10 MG tablet, Take 1 tablet (10 mg total) by mouth 3 (three) times daily as needed for Muscle spasms., Disp: 30 tablet, Rfl: 0    diphenhydramine HCl (BENADRYL ALLERGY ORAL), Take by mouth., Disp: , Rfl:     estradioL (ESTRACE) 0.01 % (0.1 mg/gram) vaginal cream, Place 1 g vaginally twice a week., Disp: 1 each, Rfl: 2    famotidine (PEPCID) 20 MG tablet, Take 1 tablet (20 mg total) by mouth 2 (two) times daily., Disp: 60 tablet, Rfl: 11    gabapentin (NEURONTIN) 300 MG capsule, Take 1 capsule (300 mg total) by mouth 2 (two) times daily as needed., Disp: 60 capsule, Rfl: 3    HYDROcodone-acetaminophen (NORCO) 5-325 mg per tablet, Take 1 tablet by mouth every 4 (four) hours as needed for Pain., Disp: 12 tablet, Rfl: 0    hydrOXYzine HCL (ATARAX) 25 MG tablet, Take 1 tablet (25 mg total) by mouth daily as needed for Itching., Disp: 15 tablet, Rfl: 0    NIFEdipine (ADALAT CC) 60 MG TbSR, Take 1 tablet (60 mg total) by mouth once daily., Disp: 90 tablet, Rfl: 1    omeprazole (PRILOSEC) 40 MG capsule, Take 1 capsule (40 mg total) by mouth once daily., Disp: 90 capsule, Rfl: 1    pramipexole (MIRAPEX) 0.25 MG tablet, Take 1 tablet by mouth once daily., Disp: , Rfl:     atorvastatin (LIPITOR) 20 MG  tablet, , Disp: , Rfl:     solifenacin (VESICARE) 5 MG tablet, Take 1 tablet (5 mg total) by mouth once daily. (Patient not taking: Reported on 6/27/2023), Disp: 90 tablet, Rfl: 3    Current Facility-Administered Medications:     betamethasone acetate-betamethasone sodium phosphate injection 6 mg, 6 mg, Intramuscular, 1 time in Clinic/HOD, KAIDEN Choudhary      Health Risk Assessment   Fall Risk:  at risk uses cane with fall safety education information given   Obesity: BMI Body mass index is 32.92 kg/m².   Advance Directive: no nut information given    Depression: PHQ9- 1   HTN:  DASH diet, exercise, weight management, med compliance, home BP monitoring, and follow-up discussed.   T2DM: no  STI: 6/29/22 Syphilis Ab non-reactive   Statin Use: yes      Health Maintenance   Last eye exam: has appointment with Dr. Omalley 9/23   Last CV screen with lipids: drawn this visit   Diabetes screening with fasting glucose or A1c: 3/14/23   Colonoscopy: 6/23/22 Dr. Hazel - repeat in 10 years   Flu Vaccine: declined   Pneumonia vaccines: declined   COVID vaccine: (moderna) 10/26/21, 3/18/21, 2/20/21   Hep B vaccine: na   DEXA: 3/27/23   Last pap/pelvic: 3/14/23 Dr. Contreras - history of hysterectomy   Last Mammogram: 9/20/22   AAA screening: na   HIV Screening: not at risk  Hepatitis C Screen: 6/14/22 non-reactive  Low Dose CT Scan: na    Health Maintenance Topics with due status: Not Due       Topic Last Completion Date    Lipid Panel 06/14/2022    Colorectal Cancer Screening 06/23/2022    Mammogram 09/20/2022    DEXA Scan 03/27/2023    Influenza Vaccine Not Due     Health Maintenance Due   Topic Date Due    TETANUS VACCINE  Never done    Pneumococcal Vaccines (Age 65+) (1 - PCV) Never done    COVID-19 Vaccine (4 - Moderna series) 12/21/2021         Lab results available in Epic or see dates from [facility] above:   Lab Results   Component Value Date    CHOL 268 (H) 06/14/2022    CHOL 224 02/02/2021     Lab Results   Component Value  Date    HDL 66 (H) 06/14/2022    HDL 67 02/02/2021     Lab Results   Component Value Date    LDLCALC 185 06/14/2022    LDLCALC 133 02/02/2021     Lab Results   Component Value Date    TRIG 84 06/14/2022    TRIG 118 02/02/2021     Lab Results   Component Value Date    CHOLHDL 4.1 06/14/2022    CHOLHDL 3.3 02/02/2021       Lab Results   Component Value Date    HGBA1C 5.1 08/26/2021       Sodium   Date Value Ref Range Status   03/14/2023 140 136 - 145 mmol/L Final     Potassium   Date Value Ref Range Status   03/14/2023 3.8 3.5 - 5.1 mmol/L Final     Chloride   Date Value Ref Range Status   03/14/2023 106 98 - 107 mmol/L Final     CO2   Date Value Ref Range Status   03/14/2023 32 21 - 32 mmol/L Final     Glucose   Date Value Ref Range Status   03/14/2023 84 74 - 106 mg/dL Final     BUN   Date Value Ref Range Status   03/14/2023 14 7 - 18 mg/dL Final     Creatinine   Date Value Ref Range Status   03/14/2023 0.90 0.55 - 1.02 mg/dL Final     Calcium   Date Value Ref Range Status   03/14/2023 9.0 8.5 - 10.1 mg/dL Final     Total Protein   Date Value Ref Range Status   03/14/2023 7.1 6.4 - 8.2 g/dL Final     Albumin   Date Value Ref Range Status   03/14/2023 3.7 3.5 - 5.0 g/dL Final     Bilirubin, Total   Date Value Ref Range Status   03/14/2023 0.3 >0.0 - 1.2 mg/dL Final     Alk Phos   Date Value Ref Range Status   03/14/2023 65 55 - 142 U/L Final     AST   Date Value Ref Range Status   03/14/2023 25 15 - 37 U/L Final     ALT   Date Value Ref Range Status   03/14/2023 17 13 - 56 U/L Final     Anion Gap   Date Value Ref Range Status   03/14/2023 6 (L) 7 - 16 mmol/L Final     eGFR    Date Value Ref Range Status   12/20/2021 72 >=60 mL/min/1.73m² Final     eGFR   Date Value Ref Range Status   06/01/2022 76 >=60 mL/min/1.73m² Final         Incontinence  Bowel: no  Bladder: no      Care Team  Dr. Wiley -PCP                 Dr. Omalley- ophthamology                 Dr. Contreras - gynecology    **See Completed  "Assessments for Annual Wellness visit within the encounter summary    The following assessments were completed & reviewed:  Depression Screening  Cognitive function Screening  Timed Get Up Test  Whisper Test  Vision Screen  Health Risk Assessment  Checklist of ADLs and IADLs        Objective  Vitals:    06/27/23 0839   BP: 130/66   Pulse: 73   Resp: 16   Temp: 98.5 °F (36.9 °C)   TempSrc: Oral   SpO2: 99%   Weight: 81.6 kg (180 lb)   Height: 5' 2" (1.575 m)   PainSc:   6   PainLoc: Hip      Body mass index is 32.92 kg/m².  Ideal body weight: 50.1 kg (110 lb 7.2 oz)       Physical Exam      Assessment:     1. Encounter for subsequent annual wellness visit (AWV) in Medicare patient    2. Hypertension, unspecified type    3. Dyslipidemia    4. RLS (restless legs syndrome)    5. Rheumatoid arthritis, involving unspecified site, unspecified whether rheumatoid factor present    6. Polyneuropathy    7. Need for tetanus, diphtheria, and acellular pertussis (Tdap) vaccine    8. BMI 32.0-32.9,adult         Plan:    Referrals:   None.    Pt declined vaccines.    Lipid panel obtained.      Advised to call office if does not hear from anyone with referral appt within 2-3 weeks to check on status of referral. Voiced understanding.      Discussed and provided with a screening schedule and personal prevention plan in accordance with USPSTF age appropriate recommendations and Medicare screening guidelines.   Education, counseling, and referrals were provided as needed.  After Visit Summary printed and given to patient which includes written education and a list of any referrals if indicated.     Education including diet, exercise, falls, preventive health care for older adults, BMI and advanced directives discussed with patient and patient verbalized understanding.      F/u plan for yearly AWV.    Signature: Adelaida WOODWARD-BC      "

## 2023-06-27 ENCOUNTER — OFFICE VISIT (OUTPATIENT)
Dept: FAMILY MEDICINE | Facility: CLINIC | Age: 73
End: 2023-06-27
Payer: MEDICARE

## 2023-06-27 VITALS
OXYGEN SATURATION: 99 % | HEIGHT: 62 IN | DIASTOLIC BLOOD PRESSURE: 66 MMHG | WEIGHT: 180 LBS | BODY MASS INDEX: 33.13 KG/M2 | TEMPERATURE: 99 F | SYSTOLIC BLOOD PRESSURE: 130 MMHG | RESPIRATION RATE: 16 BRPM | HEART RATE: 73 BPM

## 2023-06-27 DIAGNOSIS — Z23 NEED FOR TETANUS, DIPHTHERIA, AND ACELLULAR PERTUSSIS (TDAP) VACCINE: ICD-10-CM

## 2023-06-27 DIAGNOSIS — G62.9 POLYNEUROPATHY: Chronic | ICD-10-CM

## 2023-06-27 DIAGNOSIS — Z00.00 ENCOUNTER FOR SUBSEQUENT ANNUAL WELLNESS VISIT (AWV) IN MEDICARE PATIENT: Primary | ICD-10-CM

## 2023-06-27 DIAGNOSIS — M06.9 RHEUMATOID ARTHRITIS, INVOLVING UNSPECIFIED SITE, UNSPECIFIED WHETHER RHEUMATOID FACTOR PRESENT: ICD-10-CM

## 2023-06-27 DIAGNOSIS — E78.5 DYSLIPIDEMIA: ICD-10-CM

## 2023-06-27 DIAGNOSIS — I10 HYPERTENSION, UNSPECIFIED TYPE: ICD-10-CM

## 2023-06-27 DIAGNOSIS — G25.81 RLS (RESTLESS LEGS SYNDROME): ICD-10-CM

## 2023-06-27 LAB
CHOLEST SERPL-MCNC: 195 MG/DL (ref 0–200)
CHOLEST/HDLC SERPL: 2.7 {RATIO}
HDLC SERPL-MCNC: 72 MG/DL (ref 40–60)
LDLC SERPL CALC-MCNC: 108 MG/DL
LDLC/HDLC SERPL: 1.5 {RATIO}
NONHDLC SERPL-MCNC: 123 MG/DL
TRIGL SERPL-MCNC: 76 MG/DL (ref 35–150)
VLDLC SERPL-MCNC: 15 MG/DL

## 2023-06-27 PROCEDURE — 80061 LIPID PANEL: ICD-10-PCS | Mod: ,,, | Performed by: CLINICAL MEDICAL LABORATORY

## 2023-06-27 PROCEDURE — 3288F PR FALLS RISK ASSESSMENT DOCUMENTED: ICD-10-PCS | Mod: ,,, | Performed by: NURSE PRACTITIONER

## 2023-06-27 PROCEDURE — 1101F PR PT FALLS ASSESS DOC 0-1 FALLS W/OUT INJ PAST YR: ICD-10-PCS | Mod: ,,, | Performed by: NURSE PRACTITIONER

## 2023-06-27 PROCEDURE — 1101F PT FALLS ASSESS-DOCD LE1/YR: CPT | Mod: ,,, | Performed by: NURSE PRACTITIONER

## 2023-06-27 PROCEDURE — 3288F FALL RISK ASSESSMENT DOCD: CPT | Mod: ,,, | Performed by: NURSE PRACTITIONER

## 2023-06-27 PROCEDURE — 1125F AMNT PAIN NOTED PAIN PRSNT: CPT | Mod: ,,, | Performed by: NURSE PRACTITIONER

## 2023-06-27 PROCEDURE — 3008F BODY MASS INDEX DOCD: CPT | Mod: ,,, | Performed by: NURSE PRACTITIONER

## 2023-06-27 PROCEDURE — G0439 PPPS, SUBSEQ VISIT: HCPCS | Mod: ,,, | Performed by: NURSE PRACTITIONER

## 2023-06-27 PROCEDURE — 3008F PR BODY MASS INDEX (BMI) DOCUMENTED: ICD-10-PCS | Mod: ,,, | Performed by: NURSE PRACTITIONER

## 2023-06-27 PROCEDURE — 3075F SYST BP GE 130 - 139MM HG: CPT | Mod: ,,, | Performed by: NURSE PRACTITIONER

## 2023-06-27 PROCEDURE — 1160F RVW MEDS BY RX/DR IN RCRD: CPT | Mod: ,,, | Performed by: NURSE PRACTITIONER

## 2023-06-27 PROCEDURE — 3075F PR MOST RECENT SYSTOLIC BLOOD PRESS GE 130-139MM HG: ICD-10-PCS | Mod: ,,, | Performed by: NURSE PRACTITIONER

## 2023-06-27 PROCEDURE — 1125F PR PAIN SEVERITY QUANTIFIED, PAIN PRESENT: ICD-10-PCS | Mod: ,,, | Performed by: NURSE PRACTITIONER

## 2023-06-27 PROCEDURE — 1159F PR MEDICATION LIST DOCUMENTED IN MEDICAL RECORD: ICD-10-PCS | Mod: ,,, | Performed by: NURSE PRACTITIONER

## 2023-06-27 PROCEDURE — 3078F PR MOST RECENT DIASTOLIC BLOOD PRESSURE < 80 MM HG: ICD-10-PCS | Mod: ,,, | Performed by: NURSE PRACTITIONER

## 2023-06-27 PROCEDURE — 1159F MED LIST DOCD IN RCRD: CPT | Mod: ,,, | Performed by: NURSE PRACTITIONER

## 2023-06-27 PROCEDURE — 1160F PR REVIEW ALL MEDS BY PRESCRIBER/CLIN PHARMACIST DOCUMENTED: ICD-10-PCS | Mod: ,,, | Performed by: NURSE PRACTITIONER

## 2023-06-27 PROCEDURE — 80061 LIPID PANEL: CPT | Mod: ,,, | Performed by: CLINICAL MEDICAL LABORATORY

## 2023-06-27 PROCEDURE — G0439 PR MEDICARE ANNUAL WELLNESS SUBSEQUENT VISIT: ICD-10-PCS | Mod: ,,, | Performed by: NURSE PRACTITIONER

## 2023-06-27 PROCEDURE — 3078F DIAST BP <80 MM HG: CPT | Mod: ,,, | Performed by: NURSE PRACTITIONER

## 2023-06-27 RX ORDER — ESCITALOPRAM OXALATE 10 MG/1
10 TABLET ORAL DAILY
Qty: 90 TABLET | Refills: 1 | Status: SHIPPED | OUTPATIENT
Start: 2023-06-27 | End: 2023-11-02 | Stop reason: SDUPTHER

## 2023-06-27 RX ORDER — ESCITALOPRAM OXALATE 10 MG/1
10 TABLET ORAL DAILY
COMMUNITY
End: 2023-06-27 | Stop reason: SDUPTHER

## 2023-06-27 RX ORDER — NIFEDIPINE 60 MG/1
60 TABLET, EXTENDED RELEASE ORAL DAILY
Qty: 90 TABLET | Refills: 1 | Status: SHIPPED | OUTPATIENT
Start: 2023-06-27 | End: 2023-11-02 | Stop reason: SDUPTHER

## 2023-06-27 NOTE — PATIENT INSTRUCTIONS
Counseling and Referral of Other Preventative  (Italic type indicates deductible and co-insurance are waived)    Patient Name: Chrissie Cunningham  Today's Date: 6/27/2023    Health Maintenance         Date Due Completion Date    TETANUS VACCINE Never done ---    Pneumococcal Vaccines (Age 65+) (1 - PCV) Never done ---    COVID-19 Vaccine (4 - Moderna series) 12/21/2021 10/26/2021    Influenza Vaccine (Season Ended) 09/01/2023 ---    Mammogram 09/20/2023 9/20/2022    DEXA Scan 03/27/2026 3/27/2023    Lipid Panel 06/14/2027 6/14/2022    Colorectal Cancer Screening 06/23/2032 6/23/2022          No orders of the defined types were placed in this encounter.  Tetanus vaccine - sent to pharmacy  Lipids- drawn this visit  Pneumonia vaccine -declined  Flu vaccine - declined

## 2023-07-09 DIAGNOSIS — Z71.89 COMPLEX CARE COORDINATION: ICD-10-CM

## 2023-07-11 ENCOUNTER — OFFICE VISIT (OUTPATIENT)
Dept: FAMILY MEDICINE | Facility: CLINIC | Age: 73
End: 2023-07-11
Payer: MEDICARE

## 2023-07-11 VITALS
WEIGHT: 184 LBS | OXYGEN SATURATION: 98 % | DIASTOLIC BLOOD PRESSURE: 68 MMHG | BODY MASS INDEX: 33.86 KG/M2 | RESPIRATION RATE: 18 BRPM | HEIGHT: 62 IN | SYSTOLIC BLOOD PRESSURE: 136 MMHG | HEART RATE: 64 BPM | TEMPERATURE: 97 F

## 2023-07-11 DIAGNOSIS — M19.012 OSTEOARTHRITIS OF LEFT SHOULDER, UNSPECIFIED OSTEOARTHRITIS TYPE: Primary | ICD-10-CM

## 2023-07-11 PROCEDURE — 3075F PR MOST RECENT SYSTOLIC BLOOD PRESS GE 130-139MM HG: ICD-10-PCS | Mod: ,,, | Performed by: INTERNAL MEDICINE

## 2023-07-11 PROCEDURE — 3008F BODY MASS INDEX DOCD: CPT | Mod: ,,, | Performed by: INTERNAL MEDICINE

## 2023-07-11 PROCEDURE — 99213 PR OFFICE/OUTPT VISIT, EST, LEVL III, 20-29 MIN: ICD-10-PCS | Mod: ,,, | Performed by: INTERNAL MEDICINE

## 2023-07-11 PROCEDURE — 1160F RVW MEDS BY RX/DR IN RCRD: CPT | Mod: ,,, | Performed by: INTERNAL MEDICINE

## 2023-07-11 PROCEDURE — 1125F AMNT PAIN NOTED PAIN PRSNT: CPT | Mod: ,,, | Performed by: INTERNAL MEDICINE

## 2023-07-11 PROCEDURE — 1125F PR PAIN SEVERITY QUANTIFIED, PAIN PRESENT: ICD-10-PCS | Mod: ,,, | Performed by: INTERNAL MEDICINE

## 2023-07-11 PROCEDURE — 1159F MED LIST DOCD IN RCRD: CPT | Mod: ,,, | Performed by: INTERNAL MEDICINE

## 2023-07-11 PROCEDURE — 3288F FALL RISK ASSESSMENT DOCD: CPT | Mod: ,,, | Performed by: INTERNAL MEDICINE

## 2023-07-11 PROCEDURE — 3075F SYST BP GE 130 - 139MM HG: CPT | Mod: ,,, | Performed by: INTERNAL MEDICINE

## 2023-07-11 PROCEDURE — 99213 OFFICE O/P EST LOW 20 MIN: CPT | Mod: ,,, | Performed by: INTERNAL MEDICINE

## 2023-07-11 PROCEDURE — 3078F DIAST BP <80 MM HG: CPT | Mod: ,,, | Performed by: INTERNAL MEDICINE

## 2023-07-11 PROCEDURE — 3008F PR BODY MASS INDEX (BMI) DOCUMENTED: ICD-10-PCS | Mod: ,,, | Performed by: INTERNAL MEDICINE

## 2023-07-11 PROCEDURE — 1101F PR PT FALLS ASSESS DOC 0-1 FALLS W/OUT INJ PAST YR: ICD-10-PCS | Mod: ,,, | Performed by: INTERNAL MEDICINE

## 2023-07-11 PROCEDURE — 1160F PR REVIEW ALL MEDS BY PRESCRIBER/CLIN PHARMACIST DOCUMENTED: ICD-10-PCS | Mod: ,,, | Performed by: INTERNAL MEDICINE

## 2023-07-11 PROCEDURE — 1159F PR MEDICATION LIST DOCUMENTED IN MEDICAL RECORD: ICD-10-PCS | Mod: ,,, | Performed by: INTERNAL MEDICINE

## 2023-07-11 PROCEDURE — 1101F PT FALLS ASSESS-DOCD LE1/YR: CPT | Mod: ,,, | Performed by: INTERNAL MEDICINE

## 2023-07-11 PROCEDURE — 3078F PR MOST RECENT DIASTOLIC BLOOD PRESSURE < 80 MM HG: ICD-10-PCS | Mod: ,,, | Performed by: INTERNAL MEDICINE

## 2023-07-11 PROCEDURE — 3288F PR FALLS RISK ASSESSMENT DOCUMENTED: ICD-10-PCS | Mod: ,,, | Performed by: INTERNAL MEDICINE

## 2023-07-11 RX ORDER — NAPROXEN 500 MG/1
500 TABLET ORAL 2 TIMES DAILY PRN
Qty: 20 TABLET | Refills: 3 | Status: SHIPPED | OUTPATIENT
Start: 2023-07-11 | End: 2023-11-02 | Stop reason: SDUPTHER

## 2023-07-13 NOTE — PROGRESS NOTES
Subjective:       Patient ID: Chrissie Cunningham is a 72 y.o. female.    Chief Complaint: Osteoarthritis (Both Shoulders ) and Hip Pain (Right hip pain )  Patient seen and evaluated patient does have degenerative joint disease of the left shoulder.  Patient stated the shoulder pain is a 5/10.    Refer to orthopedics and occupational therapy   Start Naprosyn 500 mg 1 p.o. b.i.d..  Osteoarthritis  Pertinent negatives include no abdominal pain, chest pain, fatigue or fever.   Hip Pain     .    Current Medications:    Current Outpatient Medications:     alendronate (FOSAMAX) 70 MG tablet, Take 1 tablet (70 mg total) by mouth every 7 days., Disp: 12 tablet, Rfl: 3    atorvastatin (LIPITOR) 10 MG tablet, Take 1 tablet (10 mg total) by mouth once daily., Disp: 90 tablet, Rfl: 1    atorvastatin (LIPITOR) 20 MG tablet, , Disp: , Rfl:     cetirizine (ZYRTEC) 10 MG tablet, Take 1 tablet (10 mg total) by mouth once daily., Disp: 30 tablet, Rfl: 11    cyclobenzaprine (FLEXERIL) 10 MG tablet, Take 1 tablet (10 mg total) by mouth 3 (three) times daily as needed for Muscle spasms., Disp: 30 tablet, Rfl: 0    diphenhydramine HCl (BENADRYL ALLERGY ORAL), Take by mouth., Disp: , Rfl:     EScitalopram oxalate (LEXAPRO) 10 MG tablet, Take 1 tablet (10 mg total) by mouth once daily., Disp: 90 tablet, Rfl: 1    estradioL (ESTRACE) 0.01 % (0.1 mg/gram) vaginal cream, Place 1 g vaginally twice a week., Disp: 1 each, Rfl: 2    famotidine (PEPCID) 20 MG tablet, Take 1 tablet (20 mg total) by mouth 2 (two) times daily., Disp: 60 tablet, Rfl: 11    gabapentin (NEURONTIN) 300 MG capsule, Take 1 capsule (300 mg total) by mouth 2 (two) times daily as needed., Disp: 60 capsule, Rfl: 3    HYDROcodone-acetaminophen (NORCO) 5-325 mg per tablet, Take 1 tablet by mouth every 4 (four) hours as needed for Pain., Disp: 12 tablet, Rfl: 0    hydrOXYzine HCL (ATARAX) 25 MG tablet, Take 1 tablet (25 mg total) by mouth daily as needed for Itching., Disp: 15 tablet,  "Rfl: 0    NIFEdipine (ADALAT CC) 60 MG TbSR, Take 1 tablet (60 mg total) by mouth once daily., Disp: 90 tablet, Rfl: 1    omeprazole (PRILOSEC) 40 MG capsule, Take 1 capsule (40 mg total) by mouth once daily., Disp: 90 capsule, Rfl: 1    pramipexole (MIRAPEX) 0.25 MG tablet, Take 1 tablet by mouth once daily., Disp: , Rfl:     solifenacin (VESICARE) 5 MG tablet, Take 1 tablet (5 mg total) by mouth once daily., Disp: 90 tablet, Rfl: 3    naproxen (NAPROSYN) 500 MG tablet, Take 1 tablet (500 mg total) by mouth 2 (two) times daily as needed., Disp: 20 tablet, Rfl: 3    Current Facility-Administered Medications:     betamethasone acetate-betamethasone sodium phosphate injection 6 mg, 6 mg, Intramuscular, 1 time in Clinic/HOD, KAIDEN Choudhary           Review of Systems   Constitutional:  Negative for appetite change, fatigue and fever.   Respiratory:  Negative for shortness of breath.    Cardiovascular:  Negative for chest pain.   Gastrointestinal:  Negative for abdominal pain and constipation.   Endocrine: Negative for polydipsia, polyphagia and polyuria.   Genitourinary:  Negative for difficulty urinating, frequency and hot flashes.   Allergic/Immunologic: Negative for environmental allergies.   Neurological:  Negative for dizziness and light-headedness.   Psychiatric/Behavioral:  Negative for agitation.               Vitals:    07/11/23 0858   BP: 136/68   BP Location: Right arm   Patient Position: Sitting   BP Method: Medium (Manual)   Pulse: 64   Resp: 18   Temp: 97.4 °F (36.3 °C)   TempSrc: Temporal   SpO2: 98%   Weight: 83.5 kg (184 lb)   Height: 5' 2" (1.575 m)        Physical Exam  Vitals and nursing note reviewed.   Constitutional:       Appearance: Normal appearance.   Cardiovascular:      Rate and Rhythm: Normal rate and regular rhythm.      Pulses: Normal pulses.      Heart sounds: Normal heart sounds.   Pulmonary:      Effort: Pulmonary effort is normal.      Breath sounds: Normal breath sounds. "   Abdominal:      General: Abdomen is flat. Bowel sounds are normal.      Palpations: Abdomen is soft.   Musculoskeletal:         General: Normal range of motion.   Skin:     General: Skin is warm and dry.   Neurological:      General: No focal deficit present.      Mental Status: She is alert and oriented to person, place, and time. Mental status is at baseline.         Last Labs:     Office Visit on 06/27/2023   Component Date Value    Triglycerides 06/27/2023 76     Cholesterol 06/27/2023 195     HDL Cholesterol 06/27/2023 72 (H)     Cholesterol/HDL Ratio (R* 06/27/2023 2.7     Non-HDL 06/27/2023 123     LDL Calculated 06/27/2023 108     LDL/HDL 06/27/2023 1.5     VLDL 06/27/2023 15        Last Imaging:  DXA Bone Density Axial Skeleton 1 or more sites  Narrative: EXAMINATION:  DXA BONE DENSITY AXIAL SKELETON 1 OR MORE SITES    CLINICAL HISTORY:  Other specified disorders of bone density and structure, unspecified site    TECHNIQUE:  Bone densitometry performed    COMPARISON:  None available    FINDINGS:  Vertebral body T score measurements are estimated at -0.8    Femoral neck T score measurements were estimated at -1.0    Estimated 10 year fracture rate 10%  Impression: Measurements fall within the osteopenia range  increased fracture risk.    Electronically signed by: Julito Page  Date:    03/27/2023  Time:    10:25         **Labs and x-rays personally reviewed by me    ** reviewed      Objective:        Assessment:       1. Osteoarthritis of left shoulder, unspecified osteoarthritis type  Ambulatory referral/consult to Orthopedics    Ambulatory referral/consult to Physical/Occupational Therapy           Plan:         [unfilled]

## 2023-07-19 DIAGNOSIS — M25.512 ACUTE PAIN OF BOTH SHOULDERS: ICD-10-CM

## 2023-07-19 DIAGNOSIS — M25.511 ACUTE PAIN OF BOTH SHOULDERS: ICD-10-CM

## 2023-07-19 DIAGNOSIS — M25.551 RIGHT HIP PAIN: Primary | ICD-10-CM

## 2023-07-21 ENCOUNTER — OFFICE VISIT (OUTPATIENT)
Dept: ORTHOPEDICS | Facility: CLINIC | Age: 73
End: 2023-07-21
Payer: MEDICARE

## 2023-07-21 ENCOUNTER — HOSPITAL ENCOUNTER (OUTPATIENT)
Dept: RADIOLOGY | Facility: HOSPITAL | Age: 73
Discharge: HOME | End: 2023-07-21
Attending: ORTHOPAEDIC SURGERY
Payer: MEDICARE

## 2023-07-21 DIAGNOSIS — M25.511 ACUTE PAIN OF BOTH SHOULDERS: ICD-10-CM

## 2023-07-21 DIAGNOSIS — M19.012 OSTEOARTHRITIS OF LEFT SHOULDER, UNSPECIFIED OSTEOARTHRITIS TYPE: ICD-10-CM

## 2023-07-21 DIAGNOSIS — M25.551 RIGHT HIP PAIN: ICD-10-CM

## 2023-07-21 DIAGNOSIS — M25.512 ACUTE PAIN OF BOTH SHOULDERS: ICD-10-CM

## 2023-07-21 PROCEDURE — 99214 PR OFFICE/OUTPT VISIT, EST, LEVL IV, 30-39 MIN: ICD-10-PCS | Mod: S$PBB,,, | Performed by: ORTHOPAEDIC SURGERY

## 2023-07-21 PROCEDURE — 73030 X-RAY EXAM OF SHOULDER: CPT | Mod: TC,50

## 2023-07-21 PROCEDURE — 73502 XR HIP WITH PELVIS WHEN PERFORMED, 2 OR 3  VIEWS RIGHT: ICD-10-PCS | Mod: 26,RT,, | Performed by: ORTHOPAEDIC SURGERY

## 2023-07-21 PROCEDURE — 73030 XR SHOULDER COMPLETE 2 OR MORE VIEWS BILATERAL: ICD-10-PCS | Mod: 26,50,, | Performed by: ORTHOPAEDIC SURGERY

## 2023-07-21 PROCEDURE — 73030 X-RAY EXAM OF SHOULDER: CPT | Mod: 26,50,, | Performed by: ORTHOPAEDIC SURGERY

## 2023-07-21 PROCEDURE — 73502 X-RAY EXAM HIP UNI 2-3 VIEWS: CPT | Mod: TC,RT

## 2023-07-21 PROCEDURE — 99213 OFFICE O/P EST LOW 20 MIN: CPT | Mod: PBBFAC | Performed by: ORTHOPAEDIC SURGERY

## 2023-07-21 PROCEDURE — 99214 OFFICE O/P EST MOD 30 MIN: CPT | Mod: S$PBB,,, | Performed by: ORTHOPAEDIC SURGERY

## 2023-07-21 PROCEDURE — 73502 X-RAY EXAM HIP UNI 2-3 VIEWS: CPT | Mod: 26,RT,, | Performed by: ORTHOPAEDIC SURGERY

## 2023-07-21 NOTE — PROGRESS NOTES
CC:   Chief Complaint   Patient presents with    Left Shoulder - Pain        PREVIOUS INFO:  HISTORY:   2022    Chrissie Cunningham  is a 71 y.o. comes in with numbness and tingling right leg worse than left and buttock pain she is had no particular injury her legs are just going numb and giving way         HISTORY:   2023    Chrissie Cunningham  is a 72 y.o. patient comes in with left shoulder pain we have talked about a reverse total shoulder on the right but now the left shoulder is giving her considerable problems pain at night pain with use pain COVID activities and painful knot on the top of the shoulder    She is also still having right leg pain she is followed in the Pain Clinic in [place] at [facility] on for that she has been told she has multiple bulging disc.      PAST MEDICAL HISTORY:   Past Medical History:   Diagnosis Date    Arthritis     Benign lipomatous neoplasm of skin and subcutaneous tissue of other sites 2007    Left upper back lipoma - large - resected 2006 at [facility] by Dr. Church    BV (bacterial vaginosis) 2011    Cataract     Chronic fibrocystic breast disease (FCBD) in female 2007    chronic bilaterally    GERD (gastroesophageal reflux disease)     Hypertension     Keloid of skin 2020    probably from scratching;  left and right side of mons pubis    Neuropathy     OAB (overactive bladder) 2017    by Urodynamics Study    Osteopenia 2013    as noted on 10/2011    Spinal stenosis 10/08/2013    on the left on neck x-ray    Spondylosis 10/08/2013    Stress incontinence, female 2017    by Urodynamics Study    Urinary incontinence, mixed 2017    by Urodynamics Study          PAST SURGICAL HISTORY:   Past Surgical History:   Procedure Laterality Date    BREAST BIOPSY       SECTION      CYST REMOVAL      EYE SURGERY      HYSTERECTOMY      OOPHORECTOMY      TRIGGER FINGER RELEASE Right 2022    Procedure:  RELEASE, TRIGGER FINGER, THUMB;  Surgeon: Jayant Mandujano III, MD;  Location: [facility];  Service: Orthopedics;  Laterality: Right;          ALLERGIES:   Review of patient's allergies indicates:   Allergen Reactions    Penicillins     Hydrocodone-acetaminophen Nausea And Vomiting     Other reaction(s): Unknown        MEDICATIONS :    Current Outpatient Medications:     alendronate (FOSAMAX) 70 MG tablet, Take 1 tablet (70 mg total) by mouth every 7 days., Disp: 12 tablet, Rfl: 3    atorvastatin (LIPITOR) 10 MG tablet, Take 1 tablet (10 mg total) by mouth once daily., Disp: 90 tablet, Rfl: 1    atorvastatin (LIPITOR) 20 MG tablet, , Disp: , Rfl:     cetirizine (ZYRTEC) 10 MG tablet, Take 1 tablet (10 mg total) by mouth once daily., Disp: 30 tablet, Rfl: 11    cyclobenzaprine (FLEXERIL) 10 MG tablet, Take 1 tablet (10 mg total) by mouth 3 (three) times daily as needed for Muscle spasms., Disp: 30 tablet, Rfl: 0    diphenhydramine HCl (BENADRYL ALLERGY ORAL), Take by mouth., Disp: , Rfl:     EScitalopram oxalate (LEXAPRO) 10 MG tablet, Take 1 tablet (10 mg total) by mouth once daily., Disp: 90 tablet, Rfl: 1    estradioL (ESTRACE) 0.01 % (0.1 mg/gram) vaginal cream, Place 1 g vaginally twice a week., Disp: 1 each, Rfl: 2    famotidine (PEPCID) 20 MG tablet, Take 1 tablet (20 mg total) by mouth 2 (two) times daily., Disp: 60 tablet, Rfl: 11    gabapentin (NEURONTIN) 300 MG capsule, Take 1 capsule (300 mg total) by mouth 2 (two) times daily as needed., Disp: 60 capsule, Rfl: 3    HYDROcodone-acetaminophen (NORCO) 5-325 mg per tablet, Take 1 tablet by mouth every 4 (four) hours as needed for Pain., Disp: 12 tablet, Rfl: 0    hydrOXYzine HCL (ATARAX) 25 MG tablet, Take 1 tablet (25 mg total) by mouth daily as needed for Itching., Disp: 15 tablet, Rfl: 0    naproxen (NAPROSYN) 500 MG tablet, Take 1 tablet (500 mg total) by mouth 2 (two) times daily as needed., Disp: 20 tablet, Rfl: 3    NIFEdipine (ADALAT CC) 60 MG  TbSR, Take 1 tablet (60 mg total) by mouth once daily., Disp: 90 tablet, Rfl: 1    omeprazole (PRILOSEC) 40 MG capsule, Take 1 capsule (40 mg total) by mouth once daily., Disp: 90 capsule, Rfl: 1    pramipexole (MIRAPEX) 0.25 MG tablet, Take 1 tablet by mouth once daily., Disp: , Rfl:     solifenacin (VESICARE) 5 MG tablet, Take 1 tablet (5 mg total) by mouth once daily., Disp: 90 tablet, Rfl: 3    Current Facility-Administered Medications:     betamethasone acetate-betamethasone sodium phosphate injection 6 mg, 6 mg, Intramuscular, 1 time in Clinic/HOD, KAIDEN Choudhary     SOCIAL HISTORY:   Social History     Socioeconomic History    Marital status:    Tobacco Use    Smoking status: Never     Passive exposure: Never    Smokeless tobacco: Never   Substance and Sexual Activity    Alcohol use: Never    Drug use: Never    Sexual activity: Not Currently        ROS    FAMILY HISTORY:   Family History   Problem Relation Age of Onset    Prostate cancer Father     Diabetes Father     Hypertension Father     Hypertension Mother     Breast cancer Sister     Breast cancer Sister     Hyperlipidemia Neg Hx     Stroke Neg Hx           PHYSICAL EXAM: There were no vitals filed for this visit.            There is no height or weight on file to calculate BMI.     In general, this is a well-developed, well-nourished female . The patient is alert, oriented and cooperative.      HEENT:  Normocephalic, atraumatic.  Extraocular movements are intact bilaterally.  The oropharynx is benign.       NECK:  Nontender with good range of motion.      PULMONARY: Respirations are even and non-labored.       CARDIOVASCULAR: Pulses regular by peripheral palpation.       ABDOMEN:  Soft, non-tender, non-distended.        EXTREMITIES:  Right leg you can flex her up to 100° she has 45° external rotation 10° internal rotation with no groin pain associated with this appears to be coming out of her    Left shoulder stronger the neck neck  motion does not cause pain the left sternoclavicular joints nontender AC joint prominent and painful subacromial space is tender impingement testing and abduction testing both cause pain coming down from overhead really causes pain she has decreased strength in Jose Maria's testing you feel a  squish in the subacromial space    Ortho Exam      RADIOGRAPHIC FINDINGS:  AP of the pelvis AP and lateral the right hip spurring on the acetabulum superiorly joint space appears to be maintained no fracture dislocations appreciated    Right shoulder AP transscapular lateral view there is mild-to-moderate degenerative changes glenohumeral joint there is superior migration of the humeral head consistent with rotator cuff arthropathy no fracture dislocations appreciated  .  Left shoulder AP and transscapular lateral views there is spurring on the acromion with cystic changes possible postsurgical changes involving the greater tuberosity no fracture dislocations appreciated    IMPRESSION:  1.  She had rotator cuff arthropathy involving the right shoulder have talked to her previous about a reverse total shoulder    2.  Left Shoulder is very concerning for rotator cuff tear hopefully is can be repaired will MRI    3. Right leg pain I think is coming out back would not recommend total hip replacement 2 her she does have some arthritis of her hip at that is not the issue     PLAN:  MRI of left shoulder        No follow-ups on file.         Jayant Mandujano III      (Subject to voice recognition error, transcription service not allowed)

## 2023-08-09 ENCOUNTER — TELEPHONE (OUTPATIENT)
Dept: ORTHOPEDICS | Facility: CLINIC | Age: 73
End: 2023-08-09
Payer: MEDICARE

## 2023-08-09 NOTE — TELEPHONE ENCOUNTER
SPOKE TO PT ABOUT HER MRI RESULTS. SHE IS GOING TO CALL BACK LATER THIS MONTH TO SET UP HER SURGERY

## 2023-08-09 NOTE — TELEPHONE ENCOUNTER
----- Message from Jayant Mandujano III, MD sent at 8/8/2023  8:06 AM CDT -----  Agree with full-thickness rotator cuff tear on the left shoulder  Does not appear to have significant muscle atrophy  Large tear would recommend arthroscopy and open acromioplasty was there was a large spur present with rotator cuff repair

## 2023-08-30 DIAGNOSIS — K21.9 GASTROESOPHAGEAL REFLUX DISEASE WITHOUT ESOPHAGITIS: ICD-10-CM

## 2023-08-31 RX ORDER — OMEPRAZOLE 40 MG/1
40 CAPSULE, DELAYED RELEASE ORAL
Qty: 90 CAPSULE | Refills: 0 | Status: SHIPPED | OUTPATIENT
Start: 2023-08-31 | End: 2023-11-02 | Stop reason: SDUPTHER

## 2023-09-26 ENCOUNTER — HOSPITAL ENCOUNTER (OUTPATIENT)
Dept: RADIOLOGY | Facility: HOSPITAL | Age: 73
Discharge: HOME | End: 2023-09-26
Attending: OBSTETRICS & GYNECOLOGY
Payer: MEDICARE

## 2023-09-26 VITALS — HEIGHT: 63 IN | WEIGHT: 180 LBS | BODY MASS INDEX: 31.89 KG/M2

## 2023-09-26 DIAGNOSIS — Z12.31 OTHER SCREENING MAMMOGRAM: ICD-10-CM

## 2023-09-26 PROCEDURE — 77067 SCR MAMMO BI INCL CAD: CPT | Mod: TC

## 2023-10-02 PROBLEM — Z00.00 ENCOUNTER FOR SUBSEQUENT ANNUAL WELLNESS VISIT (AWV) IN MEDICARE PATIENT: Status: RESOLVED | Noted: 2022-06-14 | Resolved: 2023-10-02

## 2023-10-03 ENCOUNTER — OFFICE VISIT (OUTPATIENT)
Dept: ORTHOPEDICS | Facility: CLINIC | Age: 73
End: 2023-10-03
Payer: MEDICARE

## 2023-10-03 ENCOUNTER — HOSPITAL ENCOUNTER (OUTPATIENT)
Dept: RADIOLOGY | Facility: HOSPITAL | Age: 73
Discharge: HOME | End: 2023-10-03
Attending: ORTHOPAEDIC SURGERY
Payer: MEDICARE

## 2023-10-03 ENCOUNTER — CLINICAL SUPPORT (OUTPATIENT)
Dept: CARDIOLOGY | Facility: CLINIC | Age: 73
End: 2023-10-03
Payer: MEDICARE

## 2023-10-03 DIAGNOSIS — Z01.811 PRE-OPERATIVE RESPIRATORY EXAMINATION: ICD-10-CM

## 2023-10-03 DIAGNOSIS — Z01.810 PRE-OPERATIVE CARDIOVASCULAR EXAMINATION: ICD-10-CM

## 2023-10-03 DIAGNOSIS — M25.519 ACUTE SHOULDER PAIN, UNSPECIFIED LATERALITY: Primary | ICD-10-CM

## 2023-10-03 DIAGNOSIS — Z01.812 PRE-OPERATIVE LABORATORY EXAMINATION: ICD-10-CM

## 2023-10-03 PROCEDURE — 93005 ELECTROCARDIOGRAM TRACING: CPT | Mod: PBBFAC | Performed by: STUDENT IN AN ORGANIZED HEALTH CARE EDUCATION/TRAINING PROGRAM

## 2023-10-03 PROCEDURE — 71046 X-RAY EXAM CHEST 2 VIEWS: CPT | Mod: 26,,, | Performed by: RADIOLOGY

## 2023-10-03 PROCEDURE — 99214 OFFICE O/P EST MOD 30 MIN: CPT | Mod: S$PBB,,, | Performed by: ORTHOPAEDIC SURGERY

## 2023-10-03 PROCEDURE — 99214 PR OFFICE/OUTPT VISIT, EST, LEVL IV, 30-39 MIN: ICD-10-PCS | Mod: S$PBB,,, | Performed by: ORTHOPAEDIC SURGERY

## 2023-10-03 PROCEDURE — 99213 OFFICE O/P EST LOW 20 MIN: CPT | Mod: PBBFAC,25 | Performed by: ORTHOPAEDIC SURGERY

## 2023-10-03 PROCEDURE — 93010 EKG 12-LEAD: ICD-10-PCS | Mod: S$PBB,,, | Performed by: STUDENT IN AN ORGANIZED HEALTH CARE EDUCATION/TRAINING PROGRAM

## 2023-10-03 PROCEDURE — 71046 XR CHEST PA AND LATERAL: ICD-10-PCS | Mod: 26,,, | Performed by: RADIOLOGY

## 2023-10-03 PROCEDURE — 71046 X-RAY EXAM CHEST 2 VIEWS: CPT | Mod: TC

## 2023-10-03 PROCEDURE — 93010 ELECTROCARDIOGRAM REPORT: CPT | Mod: S$PBB,,, | Performed by: STUDENT IN AN ORGANIZED HEALTH CARE EDUCATION/TRAINING PROGRAM

## 2023-10-03 PROCEDURE — 99211 OFF/OP EST MAY X REQ PHY/QHP: CPT | Mod: PBBFAC,25

## 2023-10-03 NOTE — PATIENT INSTRUCTIONS
Surgery Instructions     Your surgery is scheduled for 10/16/23 at [facility] on the ground floor of the Ambulatory building. You should arrive at 10:00 at the Ambulatory Care Center located at [address].    Pre Op Testing: TODAY     ____ Lab  (1st floor clinic)   ____ EKG  (2nd floor clinic)  ____ Chest xray (Imaging Center)       Our office will contact you the day before surgery with your arrival time.  DO NOT eat or drink anything after midnight the night before surgery (this includes gum, candy, chewing tobacco, etc).  You CAN NOT drive after surgery, please arrange for someone to drive you.  Bring all medication in their original bottles.  Bathe with Hibiclens the night or morning before your surgery.  The morning of your surgery ONLY take blood pressure, heart, acid reflux, or thyroid (if you take a morning dose) medication with a sip of water.   Be sure to have stopped your blood thinner medication at the appropriate time, as instructed.  Bring your C-Pap machine if you have one.  All jewelry, piercings, or false eyelashes MUST be removed prior to surgery.

## 2023-10-03 NOTE — H&P (VIEW-ONLY)
CC:   Chief Complaint   Patient presents with    Follow-up     RECHECK SHOULDER AFTER MRI        PREVIOUS INFO:  HISTORY:   12/6/2022    Chrissie Cunningham  is a 71 y.o. comes in with numbness and tingling right leg worse than left and buttock pain she is had no particular injury her legs are just going numb and giving way           HISTORY:   7/21/2023    Chrissie Cunningham  is a 72 y.o. patient comes in with left shoulder pain we have talked about a reverse total shoulder on the right but now the left shoulder is giving her considerable problems pain at night pain with use pain COVID activities and painful knot on the top of the shoulder     She is also still having right leg pain she is followed in the Pain Clinic in [place] at [facility] on for that she has been told she has multiple bulging disc.         HISTORY:   10/3/2023    Chrissie Cunningham  is a 72 y.o. long history of right shoulder pain we have talked about a reverse total shoulder the left shoulders was been giving her more acute pain recently for the last year or so pain at night pain with use can not get overhead can not get behind her back      PAST MEDICAL HISTORY:   Past Medical History:   Diagnosis Date    Arthritis     Benign lipomatous neoplasm of skin and subcutaneous tissue of other sites 08/08/2007    Left upper back lipoma - large - resected 11/2006 at [facility] by Dr. Church    BV (bacterial vaginosis) 09/28/2011    Cataract     Chronic fibrocystic breast disease (FCBD) in female 08/08/2007    chronic bilaterally    GERD (gastroesophageal reflux disease)     Hypertension     Keloid of skin 01/09/2020    probably from scratching;  left and right side of mons pubis    Neuropathy     OAB (overactive bladder) 06/29/2017    by Urodynamics Study    Osteopenia 02/26/2013    as noted on 10/2011    Spinal stenosis 10/08/2013    on the left on neck x-ray    Spondylosis 10/08/2013    Stress incontinence, female 06/29/2017    by Urodynamics Study     Urinary incontinence, mixed 2017    by Urodynamics Study          PAST SURGICAL HISTORY:   Past Surgical History:   Procedure Laterality Date    BREAST BIOPSY       SECTION      CYST REMOVAL      EYE SURGERY      HYSTERECTOMY      OOPHORECTOMY      TRIGGER FINGER RELEASE Right 2022    Procedure: RELEASE, TRIGGER FINGER, THUMB;  Surgeon: Jayant Mandujano III, MD;  Location: [facility];  Service: Orthopedics;  Laterality: Right;          ALLERGIES:   Review of patient's allergies indicates:   Allergen Reactions    Penicillins     Hydrocodone-acetaminophen Nausea And Vomiting     Other reaction(s): Unknown        MEDICATIONS :    Current Outpatient Medications:     alendronate (FOSAMAX) 70 MG tablet, Take 1 tablet (70 mg total) by mouth every 7 days., Disp: 12 tablet, Rfl: 3    atorvastatin (LIPITOR) 10 MG tablet, Take 1 tablet (10 mg total) by mouth once daily., Disp: 90 tablet, Rfl: 1    atorvastatin (LIPITOR) 20 MG tablet, , Disp: , Rfl:     cetirizine (ZYRTEC) 10 MG tablet, Take 1 tablet (10 mg total) by mouth once daily., Disp: 30 tablet, Rfl: 11    cyclobenzaprine (FLEXERIL) 10 MG tablet, Take 1 tablet (10 mg total) by mouth 3 (three) times daily as needed for Muscle spasms., Disp: 30 tablet, Rfl: 0    diphenhydramine HCl (BENADRYL ALLERGY ORAL), Take by mouth., Disp: , Rfl:     EScitalopram oxalate (LEXAPRO) 10 MG tablet, Take 1 tablet (10 mg total) by mouth once daily., Disp: 90 tablet, Rfl: 1    estradioL (ESTRACE) 0.01 % (0.1 mg/gram) vaginal cream, Place 1 g vaginally twice a week., Disp: 1 each, Rfl: 2    famotidine (PEPCID) 20 MG tablet, Take 1 tablet (20 mg total) by mouth 2 (two) times daily., Disp: 60 tablet, Rfl: 11    gabapentin (NEURONTIN) 300 MG capsule, Take 1 capsule (300 mg total) by mouth 2 (two) times daily as needed., Disp: 60 capsule, Rfl: 3    HYDROcodone-acetaminophen (NORCO) 5-325 mg per tablet, Take 1 tablet by mouth every 4 (four) hours as needed for Pain.,  Disp: 12 tablet, Rfl: 0    hydrOXYzine HCL (ATARAX) 25 MG tablet, Take 1 tablet (25 mg total) by mouth daily as needed for Itching., Disp: 15 tablet, Rfl: 0    naproxen (NAPROSYN) 500 MG tablet, Take 1 tablet (500 mg total) by mouth 2 (two) times daily as needed., Disp: 20 tablet, Rfl: 3    NIFEdipine (ADALAT CC) 60 MG TbSR, Take 1 tablet (60 mg total) by mouth once daily., Disp: 90 tablet, Rfl: 1    omeprazole (PRILOSEC) 40 MG capsule, Take 1 capsule by mouth once daily, Disp: 90 capsule, Rfl: 0    pramipexole (MIRAPEX) 0.25 MG tablet, Take 1 tablet by mouth once daily., Disp: , Rfl:     solifenacin (VESICARE) 5 MG tablet, Take 1 tablet (5 mg total) by mouth once daily., Disp: 90 tablet, Rfl: 3    Current Facility-Administered Medications:     betamethasone acetate-betamethasone sodium phosphate injection 6 mg, 6 mg, Intramuscular, [facility]     SOCIAL HISTORY:   Social History     Socioeconomic History    Marital status:    Tobacco Use    Smoking status: Never     Passive exposure: Never    Smokeless tobacco: Never   Substance and Sexual Activity    Alcohol use: Never    Drug use: Never    Sexual activity: Not Currently        ROS    FAMILY HISTORY:   Family History   Problem Relation Age of Onset    Prostate cancer Father     Diabetes Father     Hypertension Father     Hypertension Mother     Breast cancer Sister     Breast cancer Sister     Hyperlipidemia Neg Hx     Stroke Neg Hx           PHYSICAL EXAM: There were no vitals filed for this visit.            There is no height or weight on file to calculate BMI.     In general, this is a well-developed, well-nourished female . The patient is alert, oriented and cooperative.      HEENT:  Normocephalic, atraumatic.  Extraocular movements are intact bilaterally.  The oropharynx is benign.       NECK:  Nontender with good range of motion.      PULMONARY: Respirations are even and non-labored.       CARDIOVASCULAR: Pulses  regular by peripheral palpation.       ABDOMEN:  Soft, non-tender, non-distended.        EXTREMITIES:  Left shoulder pinch minute testing 1 2 abduction all cause pain tender to the subacromial space    Ortho Exam   Left shoulder stronger the neck neck motion does not cause pain the left sternoclavicular joints nontender AC joint prominent and painful subacromial space is tender impingement testing and abduction testing both cause pain coming down from overhead really causes pain she has decreased strength in Jose Maria's testing you feel a  squish in the subacromial space  She is 5 ft 3 in weighs 180 lb with a BMI of 32      RADIOGRAPHIC FINDINGS:   MRI Shoulder Without Contrast Left: Result Notes     Jayant Mandujano III, MD   8/8/2023  8:06 AM CDT       Agree with full-thickness rotator cuff tear on the left shoulder  Does not appear to have significant muscle atrophy  Large tear would recommend arthroscopy and open acromioplasty was there was a large spur present with rotator cuff repair   Impression:     Full thickness full width tear of the supraspinatus tendon with retraction to the acromioclavicular joint.  Additional tear of the subscapularis tendon with retraction in tendinosis.  Partial tear infraspinatus tendon.     Tendinosis of the proximal portion of the long head of the biceps tendon.     Fairly advanced osteoarthritic change of the acromioclavicular joint with inferiorly directed osteophytes similar to the radiograph.        Electronically signed by: Severino Lara  Date:                                            07/31/2023  Time    .      IMPRESSION:  Left shoulder large rotator cuff tear discussed with her risks benefits of surgical repair also discussed with her that is large may get the not been repaired all the way she says it is only been bothering for about a year but there is significant retraction long slow rehab course discussed at length    PLAN:  Left shoulder arthroscopy with open  acromioplasty rotator cuff repair if repairable biceps work as indicated              Jayant Mandujano III      (Subject to voice recognition error, transcription service not allowed)

## 2023-10-03 NOTE — PROGRESS NOTES
CC:   Chief Complaint   Patient presents with    Follow-up     RECHECK SHOULDER AFTER MRI        PREVIOUS INFO:  HISTORY:   12/6/2022    Chrissie Cunningham  is a 71 y.o. comes in with numbness and tingling right leg worse than left and buttock pain she is had no particular injury her legs are just going numb and giving way           HISTORY:   7/21/2023    Chrissie Cunningham  is a 72 y.o. patient comes in with left shoulder pain we have talked about a reverse total shoulder on the right but now the left shoulder is giving her considerable problems pain at night pain with use pain COVID activities and painful knot on the top of the shoulder     She is also still having right leg pain she is followed in the Pain Clinic in [place] at [facility] on for that she has been told she has multiple bulging disc.         HISTORY:   10/3/2023    Chrissie Cunningham  is a 72 y.o. long history of right shoulder pain we have talked about a reverse total shoulder the left shoulders was been giving her more acute pain recently for the last year or so pain at night pain with use can not get overhead can not get behind her back      PAST MEDICAL HISTORY:   Past Medical History:   Diagnosis Date    Arthritis     Benign lipomatous neoplasm of skin and subcutaneous tissue of other sites 08/08/2007    Left upper back lipoma - large - resected 11/2006 at [facility] by Dr. Church    BV (bacterial vaginosis) 09/28/2011    Cataract     Chronic fibrocystic breast disease (FCBD) in female 08/08/2007    chronic bilaterally    GERD (gastroesophageal reflux disease)     Hypertension     Keloid of skin 01/09/2020    probably from scratching;  left and right side of mons pubis    Neuropathy     OAB (overactive bladder) 06/29/2017    by Urodynamics Study    Osteopenia 02/26/2013    as noted on 10/2011    Spinal stenosis 10/08/2013    on the left on neck x-ray    Spondylosis 10/08/2013    Stress incontinence, female 06/29/2017    by Urodynamics Study     Urinary incontinence, mixed 2017    by Urodynamics Study          PAST SURGICAL HISTORY:   Past Surgical History:   Procedure Laterality Date    BREAST BIOPSY       SECTION      CYST REMOVAL      EYE SURGERY      HYSTERECTOMY      OOPHORECTOMY      TRIGGER FINGER RELEASE Right 2022    Procedure: RELEASE, TRIGGER FINGER, THUMB;  Surgeon: Jayant Mandujano III, MD;  Location: [facility];  Service: Orthopedics;  Laterality: Right;          ALLERGIES:   Review of patient's allergies indicates:   Allergen Reactions    Penicillins     Hydrocodone-acetaminophen Nausea And Vomiting     Other reaction(s): Unknown        MEDICATIONS :    Current Outpatient Medications:     alendronate (FOSAMAX) 70 MG tablet, Take 1 tablet (70 mg total) by mouth every 7 days., Disp: 12 tablet, Rfl: 3    atorvastatin (LIPITOR) 10 MG tablet, Take 1 tablet (10 mg total) by mouth once daily., Disp: 90 tablet, Rfl: 1    atorvastatin (LIPITOR) 20 MG tablet, , Disp: , Rfl:     cetirizine (ZYRTEC) 10 MG tablet, Take 1 tablet (10 mg total) by mouth once daily., Disp: 30 tablet, Rfl: 11    cyclobenzaprine (FLEXERIL) 10 MG tablet, Take 1 tablet (10 mg total) by mouth 3 (three) times daily as needed for Muscle spasms., Disp: 30 tablet, Rfl: 0    diphenhydramine HCl (BENADRYL ALLERGY ORAL), Take by mouth., Disp: , Rfl:     EScitalopram oxalate (LEXAPRO) 10 MG tablet, Take 1 tablet (10 mg total) by mouth once daily., Disp: 90 tablet, Rfl: 1    estradioL (ESTRACE) 0.01 % (0.1 mg/gram) vaginal cream, Place 1 g vaginally twice a week., Disp: 1 each, Rfl: 2    famotidine (PEPCID) 20 MG tablet, Take 1 tablet (20 mg total) by mouth 2 (two) times daily., Disp: 60 tablet, Rfl: 11    gabapentin (NEURONTIN) 300 MG capsule, Take 1 capsule (300 mg total) by mouth 2 (two) times daily as needed., Disp: 60 capsule, Rfl: 3    HYDROcodone-acetaminophen (NORCO) 5-325 mg per tablet, Take 1 tablet by mouth every 4 (four) hours as needed for Pain.,  Disp: 12 tablet, Rfl: 0    hydrOXYzine HCL (ATARAX) 25 MG tablet, Take 1 tablet (25 mg total) by mouth daily as needed for Itching., Disp: 15 tablet, Rfl: 0    naproxen (NAPROSYN) 500 MG tablet, Take 1 tablet (500 mg total) by mouth 2 (two) times daily as needed., Disp: 20 tablet, Rfl: 3    NIFEdipine (ADALAT CC) 60 MG TbSR, Take 1 tablet (60 mg total) by mouth once daily., Disp: 90 tablet, Rfl: 1    omeprazole (PRILOSEC) 40 MG capsule, Take 1 capsule by mouth once daily, Disp: 90 capsule, Rfl: 0    pramipexole (MIRAPEX) 0.25 MG tablet, Take 1 tablet by mouth once daily., Disp: , Rfl:     solifenacin (VESICARE) 5 MG tablet, Take 1 tablet (5 mg total) by mouth once daily., Disp: 90 tablet, Rfl: 3    Current Facility-Administered Medications:     betamethasone acetate-betamethasone sodium phosphate injection 6 mg, 6 mg, Intramuscular, [facility]     SOCIAL HISTORY:   Social History     Socioeconomic History    Marital status:    Tobacco Use    Smoking status: Never     Passive exposure: Never    Smokeless tobacco: Never   Substance and Sexual Activity    Alcohol use: Never    Drug use: Never    Sexual activity: Not Currently        ROS    FAMILY HISTORY:   Family History   Problem Relation Age of Onset    Prostate cancer Father     Diabetes Father     Hypertension Father     Hypertension Mother     Breast cancer Sister     Breast cancer Sister     Hyperlipidemia Neg Hx     Stroke Neg Hx           PHYSICAL EXAM: There were no vitals filed for this visit.            There is no height or weight on file to calculate BMI.     In general, this is a well-developed, well-nourished female . The patient is alert, oriented and cooperative.      HEENT:  Normocephalic, atraumatic.  Extraocular movements are intact bilaterally.  The oropharynx is benign.       NECK:  Nontender with good range of motion.      PULMONARY: Respirations are even and non-labored.       CARDIOVASCULAR: Pulses  regular by peripheral palpation.       ABDOMEN:  Soft, non-tender, non-distended.        EXTREMITIES:  Left shoulder pinch minute testing 1 2 abduction all cause pain tender to the subacromial space    Ortho Exam   Left shoulder stronger the neck neck motion does not cause pain the left sternoclavicular joints nontender AC joint prominent and painful subacromial space is tender impingement testing and abduction testing both cause pain coming down from overhead really causes pain she has decreased strength in Jose Maria's testing you feel a  squish in the subacromial space  She is 5 ft 3 in weighs 180 lb with a BMI of 32      RADIOGRAPHIC FINDINGS:   MRI Shoulder Without Contrast Left: Result Notes     Jayant Mandujano III, MD   8/8/2023  8:06 AM CDT       Agree with full-thickness rotator cuff tear on the left shoulder  Does not appear to have significant muscle atrophy  Large tear would recommend arthroscopy and open acromioplasty was there was a large spur present with rotator cuff repair   Impression:     Full thickness full width tear of the supraspinatus tendon with retraction to the acromioclavicular joint.  Additional tear of the subscapularis tendon with retraction in tendinosis.  Partial tear infraspinatus tendon.     Tendinosis of the proximal portion of the long head of the biceps tendon.     Fairly advanced osteoarthritic change of the acromioclavicular joint with inferiorly directed osteophytes similar to the radiograph.        Electronically signed by: Severino Lara  Date:                                            07/31/2023  Time    .      IMPRESSION:  Left shoulder large rotator cuff tear discussed with her risks benefits of surgical repair also discussed with her that is large may get the not been repaired all the way she says it is only been bothering for about a year but there is significant retraction long slow rehab course discussed at length    PLAN:  Left shoulder arthroscopy with open  acromioplasty rotator cuff repair if repairable biceps work as indicated              Jayant Mandujano III      (Subject to voice recognition error, transcription service not allowed)

## 2023-10-16 ENCOUNTER — ANESTHESIA EVENT (OUTPATIENT)
Dept: SURGERY | Facility: HOSPITAL | Age: 73
End: 2023-10-16
Payer: MEDICARE

## 2023-10-16 ENCOUNTER — HOSPITAL ENCOUNTER (OUTPATIENT)
Facility: HOSPITAL | Age: 73
Discharge: HOME | End: 2023-10-16
Attending: ORTHOPAEDIC SURGERY | Admitting: ORTHOPAEDIC SURGERY
Payer: MEDICARE

## 2023-10-16 ENCOUNTER — ANESTHESIA (OUTPATIENT)
Dept: SURGERY | Facility: HOSPITAL | Age: 73
End: 2023-10-16
Payer: MEDICARE

## 2023-10-16 VITALS
SYSTOLIC BLOOD PRESSURE: 141 MMHG | HEIGHT: 63 IN | WEIGHT: 180 LBS | BODY MASS INDEX: 31.89 KG/M2 | TEMPERATURE: 98 F | OXYGEN SATURATION: 97 % | DIASTOLIC BLOOD PRESSURE: 68 MMHG | HEART RATE: 71 BPM | RESPIRATION RATE: 16 BRPM

## 2023-10-16 DIAGNOSIS — S46.012D TRAUMATIC COMPLETE TEAR OF LEFT ROTATOR CUFF, SUBSEQUENT ENCOUNTER: Primary | ICD-10-CM

## 2023-10-16 PROBLEM — S46.012A TRAUMATIC COMPLETE TEAR OF LEFT ROTATOR CUFF: Status: ACTIVE | Noted: 2023-10-16

## 2023-10-16 PROCEDURE — D9220A PRA ANESTHESIA: ICD-10-PCS | Mod: ANES,,, | Performed by: ANESTHESIOLOGY

## 2023-10-16 PROCEDURE — 37000009 HC ANESTHESIA EA ADD 15 MINS: Performed by: ORTHOPAEDIC SURGERY

## 2023-10-16 PROCEDURE — D9220A PRA ANESTHESIA: Mod: ANES,,, | Performed by: ANESTHESIOLOGY

## 2023-10-16 PROCEDURE — 27000689 HC BLADE LARYNGOSCOPE ANY SIZE

## 2023-10-16 PROCEDURE — 23130 PR PARTIAL REMOVAL/REPAIR,ACROMION: ICD-10-PCS | Mod: LT,,, | Performed by: ORTHOPAEDIC SURGERY

## 2023-10-16 PROCEDURE — 25000003 PHARM REV CODE 250

## 2023-10-16 PROCEDURE — 71000033 HC RECOVERY, INTIAL HOUR: Performed by: ORTHOPAEDIC SURGERY

## 2023-10-16 PROCEDURE — 71000015 HC POSTOP RECOV 1ST HR: Performed by: ORTHOPAEDIC SURGERY

## 2023-10-16 PROCEDURE — 97161 PT EVAL LOW COMPLEX 20 MIN: CPT

## 2023-10-16 PROCEDURE — 64415 PERIPHERAL BLOCK: ICD-10-PCS | Mod: 59,LT,, | Performed by: ANESTHESIOLOGY

## 2023-10-16 PROCEDURE — 27000165 HC TUBE, ETT CUFFED

## 2023-10-16 PROCEDURE — 63600175 PHARM REV CODE 636 W HCPCS

## 2023-10-16 PROCEDURE — 36000710: Performed by: ORTHOPAEDIC SURGERY

## 2023-10-16 PROCEDURE — D9220A PRA ANESTHESIA: ICD-10-PCS | Mod: CRNA,,,

## 2023-10-16 PROCEDURE — 27000510 HC BLANKET BAIR HUGGER ANY SIZE

## 2023-10-16 PROCEDURE — 27000655

## 2023-10-16 PROCEDURE — 27201423 OPTIME MED/SURG SUP & DEVICES STERILE SUPPLY: Performed by: ORTHOPAEDIC SURGERY

## 2023-10-16 PROCEDURE — 64415 NJX AA&/STRD BRCH PLXS IMG: CPT | Mod: 59,LT | Performed by: ANESTHESIOLOGY

## 2023-10-16 PROCEDURE — 36000711: Performed by: ORTHOPAEDIC SURGERY

## 2023-10-16 PROCEDURE — 63600175 PHARM REV CODE 636 W HCPCS: Performed by: ANESTHESIOLOGY

## 2023-10-16 PROCEDURE — 27000450 HC CEREBRAL OXIMETER PROBE

## 2023-10-16 PROCEDURE — D9220A PRA ANESTHESIA: Mod: CRNA,,,

## 2023-10-16 PROCEDURE — 27200750 HC INSULATED NEEDLE/ STIMUPLEX

## 2023-10-16 PROCEDURE — 25000003 PHARM REV CODE 250: Performed by: NURSE ANESTHETIST, CERTIFIED REGISTERED

## 2023-10-16 PROCEDURE — 23130 ACROMP/ACROMIONECTOMY PRTL: CPT | Mod: LT,,, | Performed by: ORTHOPAEDIC SURGERY

## 2023-10-16 PROCEDURE — 37000008 HC ANESTHESIA 1ST 15 MINUTES: Performed by: ORTHOPAEDIC SURGERY

## 2023-10-16 PROCEDURE — 27000716 HC OXISENSOR PROBE, ANY SIZE

## 2023-10-16 PROCEDURE — 27202344 HC EYESHIELD

## 2023-10-16 PROCEDURE — C1713 ANCHOR/SCREW BN/BN,TIS/BN: HCPCS | Performed by: ORTHOPAEDIC SURGERY

## 2023-10-16 DEVICE — ANCHOR SUT PUSHLOCK 3.5X14MM: Type: IMPLANTABLE DEVICE | Site: SHOULDER | Status: FUNCTIONAL

## 2023-10-16 DEVICE — ANCHOR SUT FT BIOCRKSCR 5.5MM: Type: IMPLANTABLE DEVICE | Site: SHOULDER | Status: FUNCTIONAL

## 2023-10-16 RX ORDER — EPHEDRINE SULFATE 50 MG/ML
INJECTION, SOLUTION INTRAVENOUS
Status: DISCONTINUED | OUTPATIENT
Start: 2023-10-16 | End: 2023-10-16

## 2023-10-16 RX ORDER — LIDOCAINE HYDROCHLORIDE 20 MG/ML
INJECTION, SOLUTION EPIDURAL; INFILTRATION; INTRACAUDAL; PERINEURAL
Status: DISCONTINUED | OUTPATIENT
Start: 2023-10-16 | End: 2023-10-16

## 2023-10-16 RX ORDER — FENTANYL CITRATE 50 UG/ML
INJECTION, SOLUTION INTRAMUSCULAR; INTRAVENOUS
Status: DISCONTINUED | OUTPATIENT
Start: 2023-10-16 | End: 2023-10-16

## 2023-10-16 RX ORDER — ROPIVACAINE HYDROCHLORIDE 5 MG/ML
INJECTION, SOLUTION EPIDURAL; INFILTRATION; PERINEURAL
Status: COMPLETED | OUTPATIENT
Start: 2023-10-16 | End: 2023-10-16

## 2023-10-16 RX ORDER — PROPOFOL 10 MG/ML
VIAL (ML) INTRAVENOUS
Status: DISCONTINUED | OUTPATIENT
Start: 2023-10-16 | End: 2023-10-16

## 2023-10-16 RX ORDER — ONDANSETRON 4 MG/1
8 TABLET, ORALLY DISINTEGRATING ORAL EVERY 8 HOURS PRN
Status: DISCONTINUED | OUTPATIENT
Start: 2023-10-16 | End: 2023-10-16 | Stop reason: HOSPADM

## 2023-10-16 RX ORDER — HYDROCODONE BITARTRATE AND ACETAMINOPHEN 5; 325 MG/1; MG/1
1 TABLET ORAL EVERY 4 HOURS PRN
Status: DISCONTINUED | OUTPATIENT
Start: 2023-10-16 | End: 2023-10-16 | Stop reason: HOSPADM

## 2023-10-16 RX ORDER — DIPHENHYDRAMINE HYDROCHLORIDE 50 MG/ML
25 INJECTION INTRAMUSCULAR; INTRAVENOUS EVERY 6 HOURS PRN
Status: DISCONTINUED | OUTPATIENT
Start: 2023-10-16 | End: 2023-10-16 | Stop reason: HOSPADM

## 2023-10-16 RX ORDER — MORPHINE SULFATE 10 MG/ML
4 INJECTION INTRAMUSCULAR; INTRAVENOUS; SUBCUTANEOUS EVERY 5 MIN PRN
Status: DISCONTINUED | OUTPATIENT
Start: 2023-10-16 | End: 2023-10-16 | Stop reason: HOSPADM

## 2023-10-16 RX ORDER — HYDROCODONE BITARTRATE AND ACETAMINOPHEN 5; 325 MG/1; MG/1
1 TABLET ORAL EVERY 4 HOURS PRN
Qty: 20 TABLET | Refills: 0 | Status: SHIPPED | OUTPATIENT
Start: 2023-10-16

## 2023-10-16 RX ORDER — ROCURONIUM BROMIDE 10 MG/ML
INJECTION, SOLUTION INTRAVENOUS
Status: DISCONTINUED | OUTPATIENT
Start: 2023-10-16 | End: 2023-10-16

## 2023-10-16 RX ORDER — ONDANSETRON 2 MG/ML
INJECTION INTRAMUSCULAR; INTRAVENOUS
Status: DISCONTINUED | OUTPATIENT
Start: 2023-10-16 | End: 2023-10-16

## 2023-10-16 RX ORDER — CLINDAMYCIN PHOSPHATE 900 MG/50ML
INJECTION, SOLUTION INTRAVENOUS
Status: DISCONTINUED | OUTPATIENT
Start: 2023-10-16 | End: 2023-10-16

## 2023-10-16 RX ORDER — DEXAMETHASONE SODIUM PHOSPHATE 4 MG/ML
INJECTION, SOLUTION INTRA-ARTICULAR; INTRALESIONAL; INTRAMUSCULAR; INTRAVENOUS; SOFT TISSUE
Status: DISCONTINUED | OUTPATIENT
Start: 2023-10-16 | End: 2023-10-16

## 2023-10-16 RX ORDER — ONDANSETRON 2 MG/ML
4 INJECTION INTRAMUSCULAR; INTRAVENOUS DAILY PRN
Status: DISCONTINUED | OUTPATIENT
Start: 2023-10-16 | End: 2023-10-16 | Stop reason: HOSPADM

## 2023-10-16 RX ORDER — HYDROMORPHONE HYDROCHLORIDE 2 MG/ML
0.5 INJECTION, SOLUTION INTRAMUSCULAR; INTRAVENOUS; SUBCUTANEOUS EVERY 5 MIN PRN
Status: DISCONTINUED | OUTPATIENT
Start: 2023-10-16 | End: 2023-10-16 | Stop reason: HOSPADM

## 2023-10-16 RX ORDER — MEPERIDINE HYDROCHLORIDE 25 MG/ML
25 INJECTION INTRAMUSCULAR; INTRAVENOUS; SUBCUTANEOUS ONCE AS NEEDED
Status: DISCONTINUED | OUTPATIENT
Start: 2023-10-16 | End: 2023-10-16 | Stop reason: HOSPADM

## 2023-10-16 RX ADMIN — FENTANYL CITRATE 25 MCG: 50 INJECTION INTRAMUSCULAR; INTRAVENOUS at 12:10

## 2023-10-16 RX ADMIN — ONDANSETRON 4 MG: 2 INJECTION INTRAMUSCULAR; INTRAVENOUS at 12:10

## 2023-10-16 RX ADMIN — ROCURONIUM BROMIDE 10 MG: 10 INJECTION, SOLUTION INTRAVENOUS at 01:10

## 2023-10-16 RX ADMIN — SODIUM CHLORIDE: 9 INJECTION, SOLUTION INTRAVENOUS at 11:10

## 2023-10-16 RX ADMIN — SUGAMMADEX 200 MG: 100 INJECTION, SOLUTION INTRAVENOUS at 01:10

## 2023-10-16 RX ADMIN — CLINDAMYCIN IN 5 PERCENT DEXTROSE 900 MG: 18 INJECTION, SOLUTION INTRAVENOUS at 12:10

## 2023-10-16 RX ADMIN — EPHEDRINE SULFATE 10 MG: 50 INJECTION INTRAVENOUS at 12:10

## 2023-10-16 RX ADMIN — ROCURONIUM BROMIDE 40 MG: 10 INJECTION, SOLUTION INTRAVENOUS at 12:10

## 2023-10-16 RX ADMIN — ROPIVACAINE HYDROCHLORIDE 15 ML: 5 INJECTION, SOLUTION EPIDURAL; INFILTRATION; PERINEURAL at 12:10

## 2023-10-16 RX ADMIN — EPHEDRINE SULFATE 20 MG: 50 INJECTION INTRAVENOUS at 12:10

## 2023-10-16 RX ADMIN — LIDOCAINE HYDROCHLORIDE 60 MG: 20 INJECTION, SOLUTION INTRAVENOUS at 12:10

## 2023-10-16 RX ADMIN — FENTANYL CITRATE 25 MCG: 50 INJECTION INTRAMUSCULAR; INTRAVENOUS at 11:10

## 2023-10-16 RX ADMIN — PROPOFOL 100 MG: 10 INJECTION, EMULSION INTRAVENOUS at 12:10

## 2023-10-16 RX ADMIN — DEXAMETHASONE SODIUM PHOSPHATE 8 MG: 4 INJECTION, SOLUTION INTRA-ARTICULAR; INTRALESIONAL; INTRAMUSCULAR; INTRAVENOUS; SOFT TISSUE at 12:10

## 2023-10-16 NOTE — ANESTHESIA PROCEDURE NOTES
Peripheral Block    Patient location during procedure: pre-op   Block not for primary anesthetic.  Reason for block: at surgeon's request and post-op pain management   Post-op Pain Location: left shoulder   Start time: 10/16/2023 11:56 AM  Timeout: 10/16/2023 11:55 AM   End time: 10/16/2023 12:03 PM    Staffing  Authorizing Provider: Emerson Monroe DO  Performing Provider: Emerson Monroe DO    Staffing  Performed by: Emerson Monroe DO  Authorized by: Emerson Monroe DO    Preanesthetic Checklist  Completed: patient identified, IV checked, site marked, risks and benefits discussed, surgical consent, monitors and equipment checked, pre-op evaluation and timeout performed  Peripheral Block  Prep: ChloraPrep  Patient monitoring: heart rate, cardiac monitor, continuous pulse ox, continuous capnometry and frequent blood pressure checks  Block type: interscalene  Laterality: left  Injection technique: single shot  Needle  Needle type: Stimuplex   Needle gauge: 22 G  Needle length: 2 in  Needle localization: anatomical landmarks and ultrasound guidance   -ultrasound image captured on disc.  Assessment  Injection assessment: negative aspiration, negative parasthesia and local visualized surrounding nerve  Paresthesia pain: none  Heart rate change: no  Slow fractionated injection: yes  Pain Tolerance: comfortable throughout block  Medications:    Medications: ROPIvacaine (NAROPIN) injection 0.5% - Perineural   15 mL - 10/16/2023 12:03:00 PM    Additional Notes  VSS.  [facility] RN monitoring vitals throughout procedure.  Patient tolerated procedure well.

## 2023-10-16 NOTE — ANESTHESIA PROCEDURE NOTES
Intubation    Date/Time: 10/16/2023 12:02 PM    Performed by: Jake Silva II, CRNA  Authorized by: Emerson Monroe DO    Intubation:     Induction:  Intravenous    Intubated:  Postinduction    Mask Ventilation:  Easy mask    Attempts:  1    Attempted By:  CRNA    Method of Intubation:  Direct    Blade:  Funmilayo 3    Laryngeal View Grade: Grade I - full view of cords      Difficult Airway Encountered?: No      Complications:  None    Airway Device:  Oral endotracheal tube    Airway Device Size:  7.0    Style/Cuff Inflation:  Cuffed    Inflation Amount (mL):  7    Tube secured:  21    Secured at:  The lips    Placement Verified By:  Capnometry    Complicating Factors:  None    Findings Post-Intubation:  BS equal bilateral and atraumatic/condition of teeth unchanged

## 2023-10-16 NOTE — TRANSFER OF CARE
"Anesthesia Transfer of Care Note    Patient: Chrissie Cunningham    Procedure(s) Performed: Procedure(s) (LRB):  ARTHROSCOPY, SHOULDER (Left)  ACROMIOPLASTY (Left)  REPAIR, ROTATOR CUFF (Left)  REPAIR,TENDON,DISTAL PROXIMAL (Left)    Patient location: PACU    Anesthesia Type: general    Transport from OR: Transported from OR on room air with adequate spontaneous ventilation    Post pain: adequate analgesia    Post assessment: no apparent anesthetic complications    Post vital signs: stable    Level of consciousness: lethargic    Nausea/Vomiting: no nausea/vomiting    Complications: none    Transfer of care protocol was followed      Last vitals:   Visit Vitals  BP (!) 153/87   Pulse 71   Temp 36.5 °C (97.7 °F)   Resp 16   Ht 5' 3" (1.6 m)   Wt 81.6 kg (180 lb)   SpO2 99%   Breastfeeding No   BMI 31.89 kg/m²     "

## 2023-10-16 NOTE — OP NOTE
Department of Orthopedic Surgery    Operative Note      Surgery Date: 10/16/2023     PREOPERATIVE DIAGNOSIS:  Left rotator cuff tear complete    POSTOPERATIVE DIAGNOSIS:  Left shoulder labrum tear chondromalacia grade 2-3 the humeral head  Complete rotator cuff tear complex cuff was in poor condition    PROCEDURE:  Left shoulder arthroscopy debridement of partial labrum tear shaving of rotator cuff tear  Open acromioplasty with rotator cuff repair 1 bio corkscrew 2 push locks and simple sutures    IMPANTS USED:   Implant Name Type Inv. Item Serial No.  Lot No. LRB No. Used Action   ANCHOR SUT FT BIOCRKSCR 5.5MM - [id number]  ANCHOR SUT FT BIOCRKSCR 5.5MM  [employer] [id number] Left 1 Implanted   ANCHOR SUT PUSHLOCK 3.5X14MM - [id number]  ANCHOR SUT PUSHLOCK 3.5X14MM  [employer] [id number] Left 1 Implanted   ANCHOR SUT PUSHLOCK 3.5X14MM - [id number]  ANCHOR SUT PUSHLOCK 3.5X14MM  [employer] [id number] Left 1 Implanted       SURGEON: Dr. Mandujano     ASSISTANT:  None    ANESTHESIA: Choice    ESTIMATED BLOOD LOSS:  10 cc    COMPLICATIONS:  None    INDICATION:   Chrissie Cunningham is a 72 y.o. patient had a complete tear on MRI she had pain and weakness failed conservative therapies desires surgical intervention the long rehab course discussed at length the opposite shoulder is beyond repair and needs a reverse total shoulder but this is the left shoulder she desires surgery on today    PROCEDURE: The patient was brought to the operating room.  General anesthesia and interscalene block were administered per Anesthesia.  The patient was positioned in a beach chair position.  All bony prominences were well padded.  The left arm and shoulder were prepped and draped in normal sterile fashion.  A spinal needle was introduced posteriorly and the shoulder was injected with Saline with good return.  The skin was cut.  Trocar for the camera was introduced.  A second portal was made anteriorly using a spinal needle going lateral to the  coracoid above the subscapularis.  Disposable trocar was then introduced.  The shoulder was scoped through all compartments.  There was synovitis was shaved the subscapularis had mild fraying but intact the labrum anterior was in good condition there was some degenerative tearing of it superiorly that was shaved smooth the humeral head itself was found have significant chondromalacia grade 2-3 that was shaved lesser wear on the glenoid the just lateral to the biceps exiting there was a large tear of the cuff and more directly laterally with the cuff was intact the biceps was advanced into the shoulder had mild fraying but was intact    Camera was removed oblique incision with the tip of the acromion dissection was carried down level of the fascia the skin was undermined circumferentially there the deltoid was then split laterally ulnar it off the anterior acromion in line with the anterior clavicle there was a large spur on the anterior acromion was excised using a saw level with the anterior clavicle osteotome was used to remove the undersurface large thickened bursa was excised there was obvious complete tear of the cuff with the biceps exposed but the biceps was definitely said it see it in his bicipital groove single anchor was placed just lateral to the biceps bio corkscrew was tapped both sets of sutures used to repair the cuff then a double row repair was used with 2 push locks in the lateral cortices and then more medial or proximal were placed 3 sutures 2. FiberWire repairing the cuff to itself where it was almost torn completely irrigated out copiously performed of the repair       Two bony tunnels were made in the acromion repairing the deltoid back to the acromion and to itself with 2. Tycron.  2-0 Vicryl and 4-0 Prolene were used on the skin.  A sterile dressing and sling were applied.  The patient tolerated the procedure well without complications.                  Jayant Mandujano III, MD

## 2023-10-16 NOTE — PT/OT/SLP EVAL
Physical Therapy Evaluation    Patient Name:  Chrissie Cunningham   MRN:  [id number]    Recommendations:     Discharge Recommendations: Low Intensity Therapy   Discharge Equipment Recommendations: none   Barriers to discharge: None    Assessment:     Chrissie Cunningham is a 72 y.o. female admitted with a medical diagnosis of <principal problem not specified>.  She presents with the following impairments/functional limitations: decreased ROM Patient with good understanding of HEP, shoulder immobilization, and don/doff shirt/brace..    Rehab Prognosis: Good; patient would benefit from acute skilled PT services to address these deficits and reach maximum level of function.    Recent Surgery: Procedure(s) (LRB):  ARTHROSCOPY, SHOULDER (Left)  ACROMIOPLASTY (Left)  REPAIR, ROTATOR CUFF (Left)  REPAIR,TENDON,DISTAL PROXIMAL (Left) Day of Surgery    Plan:     During this hospitalization, patient to be seen 1 x/week to address the identified rehab impairments via therapeutic activities and progress toward the following goals:    Plan of Care Expires:  10/16/23    Subjective     Chief Complaint: none  Patient/Family Comments/goals: Patient plans on home today. Will follow up with op therpay  Pain/Comfort:  Pain Rating 1: 0/10    Patients cultural, spiritual, Pent[employer]al conflicts given the current situation: no    Living Environment:  Lives with family  Prior to admission, patients level of function was independent.  Equipment used at home: none.  DME owned (not currently used): none.  Upon discharge, patient will have assistance from family.    Objective:     Communicated with nurse prior to session.  Patient found HOB elevated with    upon PT entry to room.    General Precautions: Standard, fall  Orthopedic Precautions:LUE non weight bearing   Braces: Shoulder abduction brace  Respiratory Status: Room air    Exams:  na    Functional Mobility:  Bed Mobility:     Supine to Sit: contact guard assistance      AM-PAC 6 CLICK MOBILITY  Total  Score:24       Treatment & Education:  Don/doff abd pillow and shirt  HEP: PROM protocol, hand/elbow rom    Patient left HOB elevated with  nurse notified.    GOALS:   Multidisciplinary Problems       Physical Therapy Goals       Not on file                    History:     Past Medical History:   Diagnosis Date    Arthritis     Benign lipomatous neoplasm of skin and subcutaneous tissue of other sites 2007    Left upper back lipoma - large - resected 2006 at [facility] by Dr. Church    BV (bacterial vaginosis) 2011    Cataract     Chronic fibrocystic breast disease (FCBD) in female 2007    chronic bilaterally    GERD (gastroesophageal reflux disease)     Hypertension     Keloid of skin 2020    probably from scratching;  left and right side of mons pubis    Neuropathy     OAB (overactive bladder) 2017    by Urodynamics Study    Osteopenia 2013    as noted on 10/2011    Spinal stenosis 10/08/2013    on the left on neck x-ray    Spondylosis 10/08/2013    Stress incontinence, female 2017    by Urodynamics Study    Urinary incontinence, mixed 2017    by Urodynamics Study       Past Surgical History:   Procedure Laterality Date    BREAST BIOPSY       SECTION      CYST REMOVAL      EYE SURGERY      HYSTERECTOMY      OOPHORECTOMY      TRIGGER FINGER RELEASE Right 2022    Procedure: RELEASE, TRIGGER FINGER, THUMB;  Surgeon: Jayant Mandujano III, MD;  Location: [facility];  Service: Orthopedics;  Laterality: Right;       Time Tracking:     PT Received On: 10/16/23  PT Start Time: 1450     PT Stop Time: 1510  PT Total Time (min): 20 min     Billable Minutes: Evaluation 20      10/16/2023

## 2023-10-16 NOTE — BRIEF OP NOTE
[facility] - Orthopedic Periop Services  Brief Operative Note       Surgery Date: 10/16/2023     PREOPERATIVE DIAGNOSIS:  Left rotator cuff tear complete    POSTOPERATIVE DIAGNOSIS:  Left shoulder labrum tear chondromalacia grade 2-3 the humeral head  Complete rotator cuff tear complex cuff was in poor condition    PROCEDURE:  Left shoulder arthroscopy debridement of partial labrum tear shaving of rotator cuff tear  Open acromioplasty with rotator cuff repair 1 bio corkscrew 2 push locks and simple sutures    IMPANTS USED:   Implant Name Type Inv. Item Serial No.  Lot No. LRB No. Used Action   ANCHOR SUT FT BIOCRKSCR 5.5MM - [id number]  ANCHOR SUT FT BIOCRKSCR 5.5MM  [employer] [id number] Left 1 Implanted   ANCHOR SUT PUSHLOCK 3.5X14MM - [id number]  ANCHOR SUT PUSHLOCK 3.5X14MM  [employer] [id number] Left 1 Implanted   ANCHOR SUT PUSHLOCK 3.5X14MM - [id number]  ANCHOR SUT PUSHLOCK 3.5X14MM  [employer] [id number] Left 1 Implanted       SURGEON: Dr. Mandujano     ASSISTANT:  None    ANESTHESIA: Choice    ESTIMATED BLOOD LOSS:  10 cc    COMPLICATIONS:  None   Specimens:   Specimen (24h ago, onward)      None              Discharge Note    OUTCOME: Patient tolerated treatment/procedure well without complication and is now ready for discharge.    DISPOSITION: Home or Self Care    FINAL DIAGNOSIS:  <principal problem not specified>    FOLLOWUP: In clinic    DISCHARGE INSTRUCTIONS:    Discharge Procedure Orders   Diet general     Call MD for:  temperature >100.4     Call MD for:  redness, tenderness, or signs of infection (pain, swelling, redness, odor or green/yellow discharge around incision site)     Remove dressing in 72 hours   Order Comments: Apply Op site postop day 3         Jayant Mandujano III

## 2023-10-16 NOTE — ANESTHESIA PREPROCEDURE EVALUATION
10/16/2023  Chrissie Cunningham is a 72 y.o., female.    Pre-op Assessment    I have reviewed the Patient Summary Reports.    I have reviewed the Nursing Notes. I have reviewed the NPO Status.   I have reviewed the Medications.     Review of Systems  Anesthesia Hx:  No problems with previous Anesthesia  Denies Family Hx of Anesthesia complications.   Denies Personal Hx of Anesthesia complications.   Social:  Non-Smoker, No Alcohol Use    Hematology/Oncology:  Hematology Normal       -- Cancer in past history:  Breast left   EENT/Dental:EENT/Dental Normal   Cardiovascular:   Exercise tolerance: good Hypertension hyperlipidemia ECG has been reviewed.    Pulmonary:   Sleep Apnea    Education provided regarding risk of obstructive sleep apnea     Renal/:  Renal/ Normal  OAB   Hepatic/GI:   GERD    Musculoskeletal:   Arthritis  Rheumatoid Arthritis   Neurological:   Neuromuscular Disease, H/o RLS  Peripheral Neuropathy    Endocrine:  Endocrine Normal  Obesity / BMI > 30  Dermatological:  Skin Normal    Psych:   Psychiatric History depression          Physical Exam  General:  Well nourished, Cooperative and Alert      Airway/Jaw/Neck:  Airway Findings: Mouth Opening: Normal   Tongue: Normal   Mallampati: II TM Distance: Normal   Neck ROM: Normal ROM      Eyes/Ears/Nose:  Eyes/Ears/Nose Findings:     Chest/Lungs:  Chest/Lungs Findings: Clear to auscultation, Normal Respiratory Rate      Heart/Vascular:  Heart Findings: Rate: Normal  Rhythm: Regular Rhythm  Sounds: Normal        Mental Status:  Mental Status Findings:         Anesthesia Plan  Type of Anesthesia, risks & benefits discussed:  Anesthesia Type:  Gen ETT, Regional    Patient's Preference:   Plan Factors:          Intra-op Monitoring Plan: standard ASA monitors  Intra-op Monitoring Plan Comments:   Post Op Pain Control Plan: per primary service following  discharge from PACU and multimodal analgesia  Post Op Pain Control Plan Comments:     Induction:   IV  Beta Blocker:  Patient is not currently on a Beta-Blocker (No further documentation required).       Informed Consent: Informed consent signed with the Patient and all parties understand the risks and agree with anesthesia plan.  All questions answered.  Anesthesia consent signed with patient.  ASA Score: 3     Day of Surgery Review of History & Physical: I have interviewed and examined the patient. I have reviewed the patient's H&P dated:  There are no significant changes.  H&P Update referred to the surgeon/provider.    Anesthesia Plan Notes: ASA 3, GETA with Left ISB        Ready For Surgery From Anesthesia Perspective.           Physical Exam  General: Well nourished, Cooperative and Alert    Airway:  Mallampati: II   Mouth Opening: Normal  TM Distance: Normal  Tongue: Normal  Neck ROM: Normal ROM    Chest/Lungs:  Clear to auscultation, Normal Respiratory Rate    Heart:  Rate: Normal  Rhythm: Regular Rhythm  Sounds: Normal        Chemistry        Component Value Date/Time     10/03/2023 1327    K 4.2 10/03/2023 1327     (H) 10/03/2023 1327    CO2 30 10/03/2023 1327    BUN 12 10/03/2023 1327    CREATININE 0.79 10/03/2023 1327    GLU 92 10/03/2023 1327        Component Value Date/Time    CALCIUM 8.9 10/03/2023 1327    ALKPHOS 65 10/03/2023 1327    AST 33 10/03/2023 1327    ALT 19 10/03/2023 1327    BILITOT 0.3 10/03/2023 1327    ESTGFRAFRICA 72 12/20/2021 1017    EGFRNONAA 76 06/01/2022 1625        Lab Results   Component Value Date    WBC 4.78 10/03/2023    HGB 13.6 10/03/2023    HCT 41.4 10/03/2023     10/03/2023     Results for orders placed or performed in visit on 10/19/22   EKG 12-lead    Collection Time: 10/19/22 10:10 AM    Narrative    Test Reason : I10,    Vent. Rate : 060 BPM     Atrial Rate : 060 BPM     P-R Int : 174 ms          QRS Dur : 090 ms      QT Int : 392 ms       P-R-T  Axes : 069 082 070 degrees     QTc Int : 392 ms    Normal sinus rhythm  Normal ECG  When compared with ECG of 20-JAN-2022 09:52,  No significant change was found  Confirmed by Chidi Young MD ([id number]) on 10/19/2022 11:13:31 AM    Referred By:             Confirmed By:Chidi Young MD           Anesthesia Plan  Type of Anesthesia, risks & benefits discussed:    Anesthesia Type: Gen ETT, Regional  Intra-op Monitoring Plan: standard ASA monitors  Post Op Pain Control Plan: per primary service following discharge from PACU and multimodal analgesia  Induction:  IV  Airway Plan: Direct, Post-Induction  Informed Consent: Informed consent signed with the Patient and all parties understand the risks and agree with anesthesia plan.  All questions answered.   ASA Score: 3  Day of Surgery Review of History & Physical: H&P Update referred to the surgeon/provider.I have interviewed and examined the patient. I have reviewed the patient's H&P dated: There are no significant changes.   Anesthesia Plan Notes: ASA 3, GETA with Left ISB    Ready For Surgery From Anesthesia Perspective.       .

## 2023-10-17 NOTE — ANESTHESIA POSTPROCEDURE EVALUATION
Anesthesia Post Evaluation    Patient: Chrissie Cunningham    Procedure(s) Performed: Procedure(s) (LRB):  ARTHROSCOPY, SHOULDER (Left)  ACROMIOPLASTY (Left)  REPAIR, ROTATOR CUFF (Left)  REPAIR,TENDON,DISTAL PROXIMAL (Left)    Final Anesthesia Type: general      Patient location during evaluation: PACU  Patient participation: Yes- Able to Participate  Level of consciousness: awake and alert and oriented  Post-procedure vital signs: reviewed and stable  Pain management: adequate  Airway patency: patent  GREGORIO mitigation strategies: Multimodal analgesia and Use of major conduction anesthesia (spinal/epidural) or peripheral nerve block  PONV status at discharge: No PONV  Anesthetic complications: no      Cardiovascular status: hemodynamically stable  Respiratory status: unassisted and spontaneous ventilation  Hydration status: euvolemic  Follow-up not needed.          Vitals Value Taken Time   /68 10/16/23 1431   Temp 36.5 °C (97.7 °F) 10/16/23 1359   Pulse 76 10/16/23 1439   Resp 16 10/16/23 1428   SpO2 99 % 10/16/23 1439   Vitals shown include unvalidated device data.      Event Time   Out of Recovery 14:24:02         Pain/Lauren Score: Lauren Score: 10 (10/16/2023  2:35 PM)  Modified Lauren Score: 20 (10/16/2023  2:58 PM)

## 2023-10-19 ENCOUNTER — OFFICE VISIT (OUTPATIENT)
Dept: CARDIOLOGY | Facility: CLINIC | Age: 73
End: 2023-10-19
Payer: MEDICARE

## 2023-10-19 VITALS
SYSTOLIC BLOOD PRESSURE: 132 MMHG | HEIGHT: 63 IN | BODY MASS INDEX: 30.77 KG/M2 | DIASTOLIC BLOOD PRESSURE: 64 MMHG | HEART RATE: 74 BPM | WEIGHT: 173.63 LBS | OXYGEN SATURATION: 97 %

## 2023-10-19 DIAGNOSIS — E78.00 HYPERCHOLESTEROLEMIA: ICD-10-CM

## 2023-10-19 PROCEDURE — 99214 OFFICE O/P EST MOD 30 MIN: CPT | Mod: S$PBB,,, | Performed by: NURSE PRACTITIONER

## 2023-10-19 PROCEDURE — 99215 OFFICE O/P EST HI 40 MIN: CPT | Mod: PBBFAC | Performed by: NURSE PRACTITIONER

## 2023-10-19 PROCEDURE — 1160F RVW MEDS BY RX/DR IN RCRD: CPT | Mod: CPTII,,, | Performed by: NURSE PRACTITIONER

## 2023-10-19 PROCEDURE — 1159F PR MEDICATION LIST DOCUMENTED IN MEDICAL RECORD: ICD-10-PCS | Mod: CPTII,,, | Performed by: NURSE PRACTITIONER

## 2023-10-19 PROCEDURE — 3075F SYST BP GE 130 - 139MM HG: CPT | Mod: CPTII,,, | Performed by: NURSE PRACTITIONER

## 2023-10-19 PROCEDURE — 99214 PR OFFICE/OUTPT VISIT, EST, LEVL IV, 30-39 MIN: ICD-10-PCS | Mod: S$PBB,,, | Performed by: NURSE PRACTITIONER

## 2023-10-19 PROCEDURE — 3078F PR MOST RECENT DIASTOLIC BLOOD PRESSURE < 80 MM HG: ICD-10-PCS | Mod: CPTII,,, | Performed by: NURSE PRACTITIONER

## 2023-10-19 PROCEDURE — 1159F MED LIST DOCD IN RCRD: CPT | Mod: CPTII,,, | Performed by: NURSE PRACTITIONER

## 2023-10-19 PROCEDURE — 1160F PR REVIEW ALL MEDS BY PRESCRIBER/CLIN PHARMACIST DOCUMENTED: ICD-10-PCS | Mod: CPTII,,, | Performed by: NURSE PRACTITIONER

## 2023-10-19 PROCEDURE — 3008F PR BODY MASS INDEX (BMI) DOCUMENTED: ICD-10-PCS | Mod: CPTII,,, | Performed by: NURSE PRACTITIONER

## 2023-10-19 PROCEDURE — 3078F DIAST BP <80 MM HG: CPT | Mod: CPTII,,, | Performed by: NURSE PRACTITIONER

## 2023-10-19 PROCEDURE — 3008F BODY MASS INDEX DOCD: CPT | Mod: CPTII,,, | Performed by: NURSE PRACTITIONER

## 2023-10-19 PROCEDURE — 3075F PR MOST RECENT SYSTOLIC BLOOD PRESS GE 130-139MM HG: ICD-10-PCS | Mod: CPTII,,, | Performed by: NURSE PRACTITIONER

## 2023-10-21 NOTE — PROGRESS NOTES
PCP: Joey Wiley MD    Referring Provider:     Subjective:   Chrissie Cunningham is a 72 y.o. female with hx of Htn and HLD,  who presents for routine follow up. Patient is doing well and denies complaints. She recently had left shoulder surgery and had no known cardiac issues jalen-op.        Fhx:  Family History   Problem Relation Age of Onset    Prostate cancer Father     Diabetes Father     Hypertension Father     Hypertension Mother     Breast cancer Sister     Breast cancer Sister     Hyperlipidemia Neg Hx     Stroke Neg Hx      Shx:   Social History     Socioeconomic History    Marital status:    Tobacco Use    Smoking status: Never     Passive exposure: Never    Smokeless tobacco: Never   Substance and Sexual Activity    Alcohol use: Never    Drug use: Never    Sexual activity: Not Currently       EKG 10/3/2023 sinus  bradycardia HR 59 bpm; o/w normal EKG  10/19/2022 RSR with HR 60 bpm; normal EKG    ECHO No results found for this or any previous visit.          Lab Results   Component Value Date     10/03/2023    K 4.2 10/03/2023     (H) 10/03/2023    CO2 30 10/03/2023    BUN 12 10/03/2023    CREATININE 0.79 10/03/2023    CALCIUM 8.9 10/03/2023    ANIONGAP 5 (L) 10/03/2023    ESTGFRAFRICA 72 12/20/2021    EGFRNONAA 76 06/01/2022       Lab Results   Component Value Date    CHOL 195 06/27/2023    CHOL 268 (H) 06/14/2022    CHOL 224 02/02/2021     Lab Results   Component Value Date    HDL 72 (H) 06/27/2023    HDL 66 (H) 06/14/2022    HDL 67 02/02/2021     Lab Results   Component Value Date    LDLCALC 108 06/27/2023    LDLCALC 185 06/14/2022    LDLCALC 133 02/02/2021     Lab Results   Component Value Date    TRIG 76 06/27/2023    TRIG 84 06/14/2022    TRIG 118 02/02/2021     Lab Results   Component Value Date    CHOLHDL 2.7 06/27/2023    CHOLHDL 4.1 06/14/2022    CHOLHDL 3.3 02/02/2021       Lab Results   Component Value Date    WBC 4.78 10/03/2023    HGB 13.6 10/03/2023    HCT 41.4  10/03/2023    MCV 91.8 10/03/2023     10/03/2023           Current Outpatient Medications:     alendronate (FOSAMAX) 70 MG tablet, Take 1 tablet (70 mg total) by mouth every 7 days., Disp: 12 tablet, Rfl: 3    atorvastatin (LIPITOR) 10 MG tablet, Take 1 tablet (10 mg total) by mouth once daily., Disp: 90 tablet, Rfl: 1    cetirizine (ZYRTEC) 10 MG tablet, Take 1 tablet (10 mg total) by mouth once daily., Disp: 30 tablet, Rfl: 11    cyclobenzaprine (FLEXERIL) 10 MG tablet, Take 1 tablet (10 mg total) by mouth 3 (three) times daily as needed for Muscle spasms., Disp: 30 tablet, Rfl: 0    EScitalopram oxalate (LEXAPRO) 10 MG tablet, Take 1 tablet (10 mg total) by mouth once daily., Disp: 90 tablet, Rfl: 1    gabapentin (NEURONTIN) 300 MG capsule, Take 1 capsule (300 mg total) by mouth 2 (two) times daily as needed., Disp: 60 capsule, Rfl: 3    HYDROcodone-acetaminophen (NORCO) 5-325 mg per tablet, Take 1 tablet by mouth every 4 (four) hours as needed for Pain., Disp: 20 tablet, Rfl: 0    naproxen (NAPROSYN) 500 MG tablet, Take 1 tablet (500 mg total) by mouth 2 (two) times daily as needed., Disp: 20 tablet, Rfl: 3    NIFEdipine (ADALAT CC) 60 MG TbSR, Take 1 tablet (60 mg total) by mouth once daily., Disp: 90 tablet, Rfl: 1    omeprazole (PRILOSEC) 40 MG capsule, Take 1 capsule by mouth once daily, Disp: 90 capsule, Rfl: 0    solifenacin (VESICARE) 5 MG tablet, Take 1 tablet (5 mg total) by mouth once daily., Disp: 90 tablet, Rfl: 3    atorvastatin (LIPITOR) 20 MG tablet, , Disp: , Rfl:     diphenhydramine HCl (BENADRYL ALLERGY ORAL), Take by mouth., Disp: , Rfl:     estradioL (ESTRACE) 0.01 % (0.1 mg/gram) vaginal cream, Place 1 g vaginally twice a week. (Patient not taking: Reported on 10/19/2023), Disp: 1 each, Rfl: 2    famotidine (PEPCID) 20 MG tablet, Take 1 tablet (20 mg total) by mouth 2 (two) times daily. (Patient not taking: Reported on 10/19/2023), Disp: 60 tablet, Rfl: 11    hydrOXYzine HCL (ATARAX)  "25 MG tablet, Take 1 tablet (25 mg total) by mouth daily as needed for Itching. (Patient not taking: Reported on 10/19/2023), Disp: 15 tablet, Rfl: 0    pramipexole (MIRAPEX) 0.25 MG tablet, Take 1 tablet by mouth once daily., Disp: , Rfl:     Current Facility-Administered Medications:     betamethasone acetate-betamethasone sodium phosphate injection 6 mg, 6 mg, Intramuscular, 1 time in Clinic/HOD, Karime Wells ACNP  Meds reviewed and reconciled.       Review of Systems   Respiratory:  Negative for shortness of breath.    Cardiovascular:  Negative for chest pain, palpitations, orthopnea, claudication, leg swelling and PND.   Neurological:  Negative for dizziness, loss of consciousness and weakness.           Objective:   /64 (BP Location: Right arm, Patient Position: Sitting)   Pulse 74   Ht 5' 3" (1.6 m)   Wt 78.7 kg (173 lb 9.6 oz)   SpO2 97%   BMI 30.75 kg/m²     Physical Exam  Vitals and nursing note reviewed.   Constitutional:       Appearance: Normal appearance. She is normal weight.   HENT:      Head: Normocephalic and atraumatic.   Neck:      Vascular: No carotid bruit.   Cardiovascular:      Rate and Rhythm: Normal rate and regular rhythm.      Pulses: Normal pulses.      Heart sounds: Normal heart sounds.   Pulmonary:      Effort: Pulmonary effort is normal.      Breath sounds: Normal breath sounds.   Abdominal:      Palpations: Abdomen is soft.   Musculoskeletal:      Cervical back: Neck supple.      Right lower leg: No edema.      Left lower leg: No edema.   Skin:     General: Skin is warm and dry.      Capillary Refill: Capillary refill takes less than 2 seconds.   Neurological:      General: No focal deficit present.      Mental Status: She is alert.           Assessment:     1. Hypercholesterolemia              Plan:   Hypertension  132/64 mmHg    Hypercholesterolemia  Lipid panel reviewed from 6/27/2023    Trig 76  HDL 72    Lipid 10 mg po daily      RTC 1 year    "

## 2023-10-31 ENCOUNTER — OFFICE VISIT (OUTPATIENT)
Dept: ORTHOPEDICS | Facility: CLINIC | Age: 73
End: 2023-10-31
Payer: MEDICARE

## 2023-10-31 DIAGNOSIS — Z09 FOLLOW-UP EXAMINATION, FOLLOWING OTHER SURGERY: Primary | ICD-10-CM

## 2023-10-31 DIAGNOSIS — M25.519 ACUTE SHOULDER PAIN, UNSPECIFIED LATERALITY: Primary | ICD-10-CM

## 2023-10-31 PROCEDURE — 99024 PR POST-OP FOLLOW-UP VISIT: ICD-10-PCS | Mod: ,,, | Performed by: ORTHOPAEDIC SURGERY

## 2023-10-31 PROCEDURE — 99212 OFFICE O/P EST SF 10 MIN: CPT | Mod: PBBFAC | Performed by: ORTHOPAEDIC SURGERY

## 2023-10-31 PROCEDURE — 99024 POSTOP FOLLOW-UP VISIT: CPT | Mod: ,,, | Performed by: ORTHOPAEDIC SURGERY

## 2023-10-31 NOTE — PROGRESS NOTES
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 Item Serial No.  Lot No. LRB No. Used Action   ANCHOR SUT FT BIOCRKSCR 5.5MM - [id number]   ANCHOR SUT FT BIOCRKSCR 5.5MM   [employer] [id number] Left 1 Implanted   ANCHOR SUT PUSHLOCK 3.5X14MM - [id number]   ANCHOR SUT PUSHLOCK 3.5X14MM   [employer] [id number] Left 1 Implanted   ANCHOR SUT PUSHLOCK 3.5X14MM - [id number]   ANCHOR SUT PUSHLOCK 3.5X14MM               History:  10/31/2023   Chrissie Cunningham is a 72 y.o.  status post 2 weeks out left shoulder rotator cuff repair the rotator cuff was then poor condition 1 bio corkscrew 2 push locks simple sutures in addition she would chondromalacia grade 2-3 the humeral head  patient it stop using her abduction pillow told to get back in it for least a couple weeks and we get her going to formal physical therapy      PE:   She is about 90° forward flexion about 40 of abduction she is trying to do it on her own told her we want to do it passively only discussed that with the      Radiology:  None        Ass/Plan:  Passive range of motion abduction pillow will  get the stitches out today follow-up in the office in about 4 weeks        Jayant Mandujano III, MD    Subject to voice recognition errors,  transcription services are not allowed

## 2023-11-02 ENCOUNTER — OFFICE VISIT (OUTPATIENT)
Dept: FAMILY MEDICINE | Facility: CLINIC | Age: 73
End: 2023-11-02
Payer: MEDICARE

## 2023-11-02 VITALS
SYSTOLIC BLOOD PRESSURE: 124 MMHG | TEMPERATURE: 98 F | HEIGHT: 63 IN | BODY MASS INDEX: 31.43 KG/M2 | WEIGHT: 177.38 LBS | HEART RATE: 67 BPM | DIASTOLIC BLOOD PRESSURE: 76 MMHG | RESPIRATION RATE: 18 BRPM | OXYGEN SATURATION: 97 %

## 2023-11-02 DIAGNOSIS — I10 HYPERTENSION, UNSPECIFIED TYPE: ICD-10-CM

## 2023-11-02 DIAGNOSIS — K21.9 GASTROESOPHAGEAL REFLUX DISEASE WITHOUT ESOPHAGITIS: ICD-10-CM

## 2023-11-02 PROCEDURE — 3008F BODY MASS INDEX DOCD: CPT | Mod: ,,, | Performed by: INTERNAL MEDICINE

## 2023-11-02 PROCEDURE — 3074F SYST BP LT 130 MM HG: CPT | Mod: ,,, | Performed by: INTERNAL MEDICINE

## 2023-11-02 PROCEDURE — 3008F PR BODY MASS INDEX (BMI) DOCUMENTED: ICD-10-PCS | Mod: ,,, | Performed by: INTERNAL MEDICINE

## 2023-11-02 PROCEDURE — 3288F FALL RISK ASSESSMENT DOCD: CPT | Mod: ,,, | Performed by: INTERNAL MEDICINE

## 2023-11-02 PROCEDURE — 99214 OFFICE O/P EST MOD 30 MIN: CPT | Mod: ,,, | Performed by: INTERNAL MEDICINE

## 2023-11-02 PROCEDURE — 1101F PT FALLS ASSESS-DOCD LE1/YR: CPT | Mod: ,,, | Performed by: INTERNAL MEDICINE

## 2023-11-02 PROCEDURE — 3074F PR MOST RECENT SYSTOLIC BLOOD PRESSURE < 130 MM HG: ICD-10-PCS | Mod: ,,, | Performed by: INTERNAL MEDICINE

## 2023-11-02 PROCEDURE — 1160F RVW MEDS BY RX/DR IN RCRD: CPT | Mod: ,,, | Performed by: INTERNAL MEDICINE

## 2023-11-02 PROCEDURE — 1159F PR MEDICATION LIST DOCUMENTED IN MEDICAL RECORD: ICD-10-PCS | Mod: ,,, | Performed by: INTERNAL MEDICINE

## 2023-11-02 PROCEDURE — 3288F PR FALLS RISK ASSESSMENT DOCUMENTED: ICD-10-PCS | Mod: ,,, | Performed by: INTERNAL MEDICINE

## 2023-11-02 PROCEDURE — 1159F MED LIST DOCD IN RCRD: CPT | Mod: ,,, | Performed by: INTERNAL MEDICINE

## 2023-11-02 PROCEDURE — 1101F PR PT FALLS ASSESS DOC 0-1 FALLS W/OUT INJ PAST YR: ICD-10-PCS | Mod: ,,, | Performed by: INTERNAL MEDICINE

## 2023-11-02 PROCEDURE — 3078F PR MOST RECENT DIASTOLIC BLOOD PRESSURE < 80 MM HG: ICD-10-PCS | Mod: ,,, | Performed by: INTERNAL MEDICINE

## 2023-11-02 PROCEDURE — 3078F DIAST BP <80 MM HG: CPT | Mod: ,,, | Performed by: INTERNAL MEDICINE

## 2023-11-02 PROCEDURE — 1125F PR PAIN SEVERITY QUANTIFIED, PAIN PRESENT: ICD-10-PCS | Mod: ,,, | Performed by: INTERNAL MEDICINE

## 2023-11-02 PROCEDURE — 99214 PR OFFICE/OUTPT VISIT, EST, LEVL IV, 30-39 MIN: ICD-10-PCS | Mod: ,,, | Performed by: INTERNAL MEDICINE

## 2023-11-02 PROCEDURE — 1160F PR REVIEW ALL MEDS BY PRESCRIBER/CLIN PHARMACIST DOCUMENTED: ICD-10-PCS | Mod: ,,, | Performed by: INTERNAL MEDICINE

## 2023-11-02 PROCEDURE — 1125F AMNT PAIN NOTED PAIN PRSNT: CPT | Mod: ,,, | Performed by: INTERNAL MEDICINE

## 2023-11-02 RX ORDER — GABAPENTIN 300 MG/1
300 CAPSULE ORAL 2 TIMES DAILY PRN
Qty: 60 CAPSULE | Refills: 3 | Status: SHIPPED | OUTPATIENT
Start: 2023-11-02 | End: 2024-02-20

## 2023-11-02 RX ORDER — ESCITALOPRAM OXALATE 10 MG/1
10 TABLET ORAL DAILY
Qty: 90 TABLET | Refills: 1 | Status: SHIPPED | OUTPATIENT
Start: 2023-11-02

## 2023-11-02 RX ORDER — NAPROXEN 500 MG/1
500 TABLET ORAL 2 TIMES DAILY PRN
Qty: 20 TABLET | Refills: 3 | Status: SHIPPED | OUTPATIENT
Start: 2023-11-02

## 2023-11-02 RX ORDER — OMEPRAZOLE 40 MG/1
40 CAPSULE, DELAYED RELEASE ORAL DAILY
Qty: 90 CAPSULE | Refills: 0 | Status: SHIPPED | OUTPATIENT
Start: 2023-11-02

## 2023-11-02 RX ORDER — CYCLOBENZAPRINE HCL 10 MG
10 TABLET ORAL 3 TIMES DAILY PRN
Qty: 30 TABLET | Refills: 0 | Status: SHIPPED | OUTPATIENT
Start: 2023-11-02 | End: 2024-03-05 | Stop reason: SDUPTHER

## 2023-11-02 RX ORDER — NIFEDIPINE 60 MG/1
60 TABLET, EXTENDED RELEASE ORAL DAILY
Qty: 90 TABLET | Refills: 1 | Status: SHIPPED | OUTPATIENT
Start: 2023-11-02

## 2023-11-02 RX ORDER — ATORVASTATIN CALCIUM 10 MG/1
10 TABLET, FILM COATED ORAL DAILY
Qty: 90 TABLET | Refills: 1 | Status: SHIPPED | OUTPATIENT
Start: 2023-11-02

## 2023-11-08 NOTE — PROGRESS NOTES
Subjective:       Patient ID: Chrissie Cunningham is a 72 y.o. female.    Chief Complaint: Shoulder Pain  Patient does complain of intermittent shoulder pain.  Patient does have plane major depressive disorder.  Patient stated Lexapro is working.  Patient also has chronic dyslipidemia.  The plan Lexapro 10 mg 1 p.o. q.day and Lipitor 10 mg 1 p.o. q.day.  Patient has chronic hypertension will refill nifedipine 60 mg 1 p.o. q.day. and for the arthritis in his shoulder Naprosyn 500 mg 1 p.o. b.i.d. p.r.n. pain.  Shoulder Pain   Pertinent negatives include no fever.     .    Current Medications:    Current Outpatient Medications:     alendronate (FOSAMAX) 70 MG tablet, Take 1 tablet (70 mg total) by mouth every 7 days., Disp: 12 tablet, Rfl: 3    atorvastatin (LIPITOR) 20 MG tablet, , Disp: , Rfl:     cetirizine (ZYRTEC) 10 MG tablet, Take 1 tablet (10 mg total) by mouth once daily., Disp: 30 tablet, Rfl: 11    diphenhydramine HCl (BENADRYL ALLERGY ORAL), Take by mouth., Disp: , Rfl:     HYDROcodone-acetaminophen (NORCO) 5-325 mg per tablet, Take 1 tablet by mouth every 4 (four) hours as needed for Pain., Disp: 20 tablet, Rfl: 0    pramipexole (MIRAPEX) 0.25 MG tablet, Take 1 tablet by mouth once daily., Disp: , Rfl:     solifenacin (VESICARE) 5 MG tablet, Take 1 tablet (5 mg total) by mouth once daily., Disp: 90 tablet, Rfl: 3    atorvastatin (LIPITOR) 10 MG tablet, Take 1 tablet (10 mg total) by mouth once daily., Disp: 90 tablet, Rfl: 1    cyclobenzaprine (FLEXERIL) 10 MG tablet, Take 1 tablet (10 mg total) by mouth 3 (three) times daily as needed for Muscle spasms., Disp: 30 tablet, Rfl: 0    EScitalopram oxalate (LEXAPRO) 10 MG tablet, Take 1 tablet (10 mg total) by mouth once daily., Disp: 90 tablet, Rfl: 1    estradioL (ESTRACE) 0.01 % (0.1 mg/gram) vaginal cream, Place 1 g vaginally twice a week. (Patient not taking: Reported on 10/19/2023), Disp: 1 each, Rfl: 2    famotidine (PEPCID) 20 MG tablet, Take 1 tablet (20 mg  "total) by mouth 2 (two) times daily. (Patient not taking: Reported on 10/19/2023), Disp: 60 tablet, Rfl: 11    gabapentin (NEURONTIN) 300 MG capsule, Take 1 capsule (300 mg total) by mouth 2 (two) times daily as needed (for pain)., Disp: 60 capsule, Rfl: 3    hydrOXYzine HCL (ATARAX) 25 MG tablet, Take 1 tablet (25 mg total) by mouth daily as needed for Itching. (Patient not taking: Reported on 10/19/2023), Disp: 15 tablet, Rfl: 0    naproxen (NAPROSYN) 500 MG tablet, Take 1 tablet (500 mg total) by mouth 2 (two) times daily as needed (for pain)., Disp: 20 tablet, Rfl: 3    NIFEdipine (ADALAT CC) 60 MG TbSR, Take 1 tablet (60 mg total) by mouth once daily., Disp: 90 tablet, Rfl: 1    omeprazole (PRILOSEC) 40 MG capsule, Take 1 capsule (40 mg total) by mouth once daily., Disp: 90 capsule, Rfl: 0    Current Facility-Administered Medications:     betamethasone acetate-betamethasone sodium phosphate injection 6 mg, 6 mg, Intramuscular, 1 time in Clinic/HOD, Karime Wells, JESSIEP           Review of Systems   Constitutional:  Negative for appetite change, fatigue and fever.   Respiratory:  Negative for shortness of breath.    Cardiovascular:  Negative for chest pain.   Gastrointestinal:  Negative for abdominal pain and constipation.   Endocrine: Negative for polydipsia, polyphagia and polyuria.   Genitourinary:  Negative for difficulty urinating, frequency and hot flashes.   Allergic/Immunologic: Negative for environmental allergies.   Neurological:  Negative for dizziness and light-headedness.   Psychiatric/Behavioral:  Negative for agitation.                 Vitals:    11/02/23 0917   BP: 124/76   BP Location: Right arm   Patient Position: Sitting   BP Method: Large (Automatic)   Pulse: 67   Resp: 18   Temp: 97.8 °F (36.6 °C)   TempSrc: Temporal   SpO2: 97%   Weight: 80.5 kg (177 lb 6.4 oz)   Height: 5' 3" (1.6 m)        Physical Exam  Vitals and nursing note reviewed.   Constitutional:       Appearance: Normal " appearance. She is obese.   Cardiovascular:      Rate and Rhythm: Normal rate and regular rhythm.      Pulses: Normal pulses.      Heart sounds: Normal heart sounds.   Pulmonary:      Effort: Pulmonary effort is normal.      Breath sounds: Normal breath sounds.   Abdominal:      General: Abdomen is flat. Bowel sounds are normal.      Palpations: Abdomen is soft.   Musculoskeletal:         General: Normal range of motion.   Skin:     General: Skin is warm and dry.   Neurological:      General: No focal deficit present.      Mental Status: She is alert and oriented to person, place, and time. Mental status is at baseline.           Last Labs:     Lab Visit on 10/03/2023   Component Date Value    Sodium 10/03/2023 140     Potassium 10/03/2023 4.2     Chloride 10/03/2023 109 (H)     CO2 10/03/2023 30     Anion Gap 10/03/2023 5 (L)     Glucose 10/03/2023 92     BUN 10/03/2023 12     Creatinine 10/03/2023 0.79     BUN/Creatinine Ratio 10/03/2023 15     Calcium 10/03/2023 8.9     Total Protein 10/03/2023 7.6     Albumin 10/03/2023 3.8     Globulin 10/03/2023 3.8     A/G Ratio 10/03/2023 1.0     Bilirubin, Total 10/03/2023 0.3     Alk Phos 10/03/2023 65     ALT 10/03/2023 19     AST 10/03/2023 33     eGFR 10/03/2023 79     WBC 10/03/2023 4.78     RBC 10/03/2023 4.51     Hemoglobin 10/03/2023 13.6     Hematocrit 10/03/2023 41.4     MCV 10/03/2023 91.8     MCH 10/03/2023 30.2     MCHC 10/03/2023 32.9     RDW 10/03/2023 12.7     Platelet Count 10/03/2023 219     MPV 10/03/2023 13.1 (H)     Neutrophils % 10/03/2023 56.3     Lymphocytes % 10/03/2023 32.6     Monocytes % 10/03/2023 8.8 (H)     Eosinophils % 10/03/2023 1.5     Basophils % 10/03/2023 0.6     Immature Granulocytes % 10/03/2023 0.2     nRBC, Auto 10/03/2023 0.0     Neutrophils, Abs 10/03/2023 2.69     Lymphocytes, Absolute 10/03/2023 1.56     Monocytes, Absolute 10/03/2023 0.42     Eosinophils, Absolute 10/03/2023 0.07     Basophils, Absolute 10/03/2023 0.03      Immature Granulocytes, A* 10/03/2023 0.01     nRBC, Absolute 10/03/2023 0.00     Diff Type 10/03/2023 Auto     Platelet Morphology 10/03/2023 Few Large Platelets (A)     RBC Morphology 10/03/2023 Normal        Last Imaging:  X-Ray Shoulder Left 1 View  Narrative: EXAMINATION:  XR SHOULDER 1 VIEW LEFT    CLINICAL HISTORY:  Acromioplasty rotator cuff repair;    COMPARISON:  21 July 2023    FINDINGS:  There has been interval shoulder arthroscopic procedure.  The joint alignment appears within normal limits postoperatively. No other abnormality is identified.  Impression: Expected postoperative appearance of the shoulder.    Electronically signed by: Julito Page  Date:    10/16/2023  Time:    14:11         **Labs and x-rays personally reviewed by me    ** reviewed      Objective:        Assessment:       1. Gastroesophageal reflux disease without esophagitis  omeprazole (PRILOSEC) 40 MG capsule      2. Hypertension, unspecified type  NIFEdipine (ADALAT CC) 60 MG TbSR           Plan:         [unfilled]

## 2023-11-30 ENCOUNTER — OFFICE VISIT (OUTPATIENT)
Dept: ORTHOPEDICS | Facility: CLINIC | Age: 73
End: 2023-11-30
Payer: MEDICARE

## 2023-11-30 DIAGNOSIS — Z09 FOLLOW-UP EXAMINATION, FOLLOWING OTHER SURGERY: Primary | ICD-10-CM

## 2023-11-30 PROCEDURE — 99213 OFFICE O/P EST LOW 20 MIN: CPT | Mod: PBBFAC,25 | Performed by: ORTHOPAEDIC SURGERY

## 2023-11-30 PROCEDURE — 99999PBSHW PR PBB SHADOW TECHNICAL ONLY FILED TO HB: Mod: PBBFAC,,,

## 2023-11-30 PROCEDURE — 99024 PR POST-OP FOLLOW-UP VISIT: ICD-10-PCS | Mod: ,,, | Performed by: ORTHOPAEDIC SURGERY

## 2023-11-30 PROCEDURE — 99999PBSHW PR PBB SHADOW TECHNICAL ONLY FILED TO HB: ICD-10-PCS | Mod: PBBFAC,,,

## 2023-11-30 PROCEDURE — 20610 PR DRAIN/INJECT LARGE JOINT/BURSA: ICD-10-PCS | Mod: S$PBB,79,RT, | Performed by: ORTHOPAEDIC SURGERY

## 2023-11-30 PROCEDURE — 20610 DRAIN/INJ JOINT/BURSA W/O US: CPT | Mod: S$PBB,79,RT, | Performed by: ORTHOPAEDIC SURGERY

## 2023-11-30 PROCEDURE — 20610 DRAIN/INJ JOINT/BURSA W/O US: CPT | Mod: PBBFAC | Performed by: ORTHOPAEDIC SURGERY

## 2023-11-30 PROCEDURE — 99024 POSTOP FOLLOW-UP VISIT: CPT | Mod: ,,, | Performed by: ORTHOPAEDIC SURGERY

## 2023-11-30 RX ORDER — BUPIVACAINE HYDROCHLORIDE 5 MG/ML
2 INJECTION, SOLUTION EPIDURAL; INTRACAUDAL
Status: COMPLETED | OUTPATIENT
Start: 2023-11-30 | End: 2023-11-30

## 2023-11-30 RX ORDER — TRIAMCINOLONE ACETONIDE 40 MG/ML
40 INJECTION, SUSPENSION INTRA-ARTICULAR; INTRAMUSCULAR
Status: COMPLETED | OUTPATIENT
Start: 2023-11-30 | End: 2023-11-30

## 2023-11-30 RX ADMIN — TRIAMCINOLONE ACETONIDE 40 MG: 400 INJECTION, SUSPENSION INTRA-ARTICULAR; INTRAMUSCULAR at 09:11

## 2023-11-30 RX ADMIN — BUPIVACAINE HYDROCHLORIDE 10 MG: 5 INJECTION, SOLUTION EPIDURAL; INTRACAUDAL at 09:11

## 2023-11-30 NOTE — PROGRESS NOTES
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  status post 2 weeks out left shoulder rotator cuff repair the rotator cuff was then poor condition 1 bio corkscrew 2 push locks simple sutures in addition she would chondromalacia grade 2-3 the humeral head  patient it stop using her abduction pillow told to get back in it for least a couple weeks and we get her going to formal physical therapy         History:  11/30/2023   Chrissie Cunningham is a 72 y.o.  status post left shoulder rotator cuff repair cuff was in very poor condition 1 bio corkscrew 2 push locks she also had chondromalacia grade 2-3 the humeral head  patient's surgery was on October 16, 2023 6 weeks out patient is having lot of trouble with the opposite shoulder of the right shoulder wants know what we can do for it      PE:   Left shoulder she would little over 90° forward flexion about 90° abduction she is neurovascularly intact I am pretty sure she is doing too much we try to get her to let us passively move her shoulder she is doing a lot of talked to her about  the    Right shoulder has impingement and pain decreased strength      Radiology:  None        Ass/Plan:  No trauma to the right shoulder were obviously can not do any kind of surgery on it right now could she is had recent left shoulder surgery will offered her injection of the right she does want to do that prep of Betadine we injected the right shoulder 1 cc Kenalog 2 of Marcaine thank you  As far as her surgical left shoulder we will continue passive motion her rotator cuff tissue was in very poor shape did talked about a home pulley also        Jayant Mandujano III, MD    Subject to voice recognition errors,  transcription services are not allowed

## 2023-12-13 ENCOUNTER — EXTERNAL HOME HEALTH (OUTPATIENT)
Dept: HOME HEALTH SERVICES | Facility: HOSPITAL | Age: 73
End: 2023-12-13
Payer: MEDICARE

## 2023-12-26 ENCOUNTER — DOCUMENT SCAN (OUTPATIENT)
Dept: HOME HEALTH SERVICES | Facility: HOSPITAL | Age: 73
End: 2023-12-26
Payer: MEDICARE

## 2024-01-11 ENCOUNTER — OFFICE VISIT (OUTPATIENT)
Dept: ORTHOPEDICS | Facility: CLINIC | Age: 74
End: 2024-01-11
Payer: MEDICARE

## 2024-01-11 DIAGNOSIS — Z09 FOLLOW-UP EXAMINATION, FOLLOWING OTHER SURGERY: Primary | ICD-10-CM

## 2024-01-11 PROCEDURE — 99024 POSTOP FOLLOW-UP VISIT: CPT | Mod: ,,, | Performed by: ORTHOPAEDIC SURGERY

## 2024-01-11 PROCEDURE — 99213 OFFICE O/P EST LOW 20 MIN: CPT | Mod: PBBFAC | Performed by: ORTHOPAEDIC SURGERY

## 2024-01-11 PROCEDURE — 1159F MED LIST DOCD IN RCRD: CPT | Mod: CPTII,,, | Performed by: ORTHOPAEDIC SURGERY

## 2024-01-11 NOTE — PROGRESS NOTES
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  status post status post injection of right shoulder November 30, 2023 left shoulder rotator cuff repair 1 bio corkscrew 2 push locks  10/16/2023 B 12 weeks out patient's rotator cuff was in very poor condition and had chondromalacia grade 2-3 the humeral head      The injection really helped the right shoulder and the left shoulder steadily improving      PE:   Right shoulder has excellent motion of the left shoulder actually has good motion she she had a  120° forward flex incision looks good      Radiology:  None        Ass/Plan:  Currently she is doing good really talked to her about going very easier rotator cuff was in very poor condition of the time of surgery nothing heavy gentle motion we will check on about 6 weeks        Jayant Mandujano III, MD    Subject to voice recognition errors,  transcription services are not allowed

## 2024-02-09 DIAGNOSIS — Z71.89 COMPLEX CARE COORDINATION: ICD-10-CM

## 2024-02-20 RX ORDER — GABAPENTIN 300 MG/1
300 CAPSULE ORAL 2 TIMES DAILY PRN
Qty: 60 CAPSULE | Refills: 0 | Status: SHIPPED | OUTPATIENT
Start: 2024-02-20 | End: 2024-03-26

## 2024-02-27 ENCOUNTER — OFFICE VISIT (OUTPATIENT)
Dept: ORTHOPEDICS | Facility: CLINIC | Age: 74
End: 2024-02-27
Payer: MEDICARE

## 2024-02-27 DIAGNOSIS — M25.519 SHOULDER PAIN, UNSPECIFIED CHRONICITY, UNSPECIFIED LATERALITY: ICD-10-CM

## 2024-02-27 DIAGNOSIS — Z09 FOLLOW-UP EXAMINATION, FOLLOWING OTHER SURGERY: Primary | ICD-10-CM

## 2024-02-27 PROCEDURE — 99212 OFFICE O/P EST SF 10 MIN: CPT | Mod: PBBFAC | Performed by: ORTHOPAEDIC SURGERY

## 2024-02-27 PROCEDURE — 99213 OFFICE O/P EST LOW 20 MIN: CPT | Mod: S$PBB,,, | Performed by: ORTHOPAEDIC SURGERY

## 2024-02-27 NOTE — PROGRESS NOTES
CC:    Chief Complaint   Patient presents with    Post-op Evaluation     LT SHOULDER SCOPE OPEN ACROMIO RCR DISTAL TENDON 10/16- 4 MONTHS 11 DAYS            Previos History :       History:  1/11/2024   Chrissie Cunningham is a 73 y.o.  status post status post injection of right shoulder November 30, 2023 left shoulder rotator cuff repair 1 bio corkscrew 2 push locks 10/16/2023 B 12 weeks out patient's rotator cuff was in very poor condition and had chondromalacia grade 2-3 the humeral head       The injection really helped the right shoulder and the left shoulder steadily improving       History:  2/27/2024   Chrissei Cunningham is a 73 y.o.  status post she is pleased with her left shoulder with a right shoulder is giving her troubles the says the injection is wearing off the   right shoulder was injected November 30, 2023  Left shoulder had surgery 1 bio corkscrew 2 push locks October 16, 2023        PE:   Left shoulder she is moving fairly freely   the right shoulder about 100° forward flexion about 70° abduction both with pain neurovascularly intact      Radiology:  Right shoulder x-rays performed on July 21, 2023 show large spurring on the acromion with narrowing of the acromial humeral space        Ass/Plan:  I will MRI right shoulder by suspect this is going to need a reverse total shoulder the left shoulder was cuff was in poor condition at the time of surgery but this is going to be worse        Jayant Mandujano III, MD    Subject to voice recognition errors,  transcription services are not allowed

## 2024-03-05 ENCOUNTER — APPOINTMENT (OUTPATIENT)
Dept: RADIOLOGY | Facility: CLINIC | Age: 74
End: 2024-03-05
Attending: INTERNAL MEDICINE
Payer: MEDICARE

## 2024-03-05 ENCOUNTER — TELEPHONE (OUTPATIENT)
Dept: FAMILY MEDICINE | Facility: CLINIC | Age: 74
End: 2024-03-05
Payer: MEDICARE

## 2024-03-05 ENCOUNTER — OFFICE VISIT (OUTPATIENT)
Dept: FAMILY MEDICINE | Facility: CLINIC | Age: 74
End: 2024-03-05
Payer: MEDICARE

## 2024-03-05 VITALS
HEART RATE: 79 BPM | BODY MASS INDEX: 32.25 KG/M2 | DIASTOLIC BLOOD PRESSURE: 81 MMHG | TEMPERATURE: 98 F | RESPIRATION RATE: 18 BRPM | HEIGHT: 63 IN | WEIGHT: 182 LBS | SYSTOLIC BLOOD PRESSURE: 144 MMHG | OXYGEN SATURATION: 98 %

## 2024-03-05 DIAGNOSIS — N39.0 URINARY TRACT INFECTION WITHOUT HEMATURIA, SITE UNSPECIFIED: Primary | ICD-10-CM

## 2024-03-05 DIAGNOSIS — N39.0 URINARY TRACT INFECTION WITHOUT HEMATURIA, SITE UNSPECIFIED: ICD-10-CM

## 2024-03-05 DIAGNOSIS — E78.5 DYSLIPIDEMIA: ICD-10-CM

## 2024-03-05 DIAGNOSIS — F33.0 MILD EPISODE OF RECURRENT MAJOR DEPRESSIVE DISORDER: ICD-10-CM

## 2024-03-05 LAB
ANION GAP SERPL CALCULATED.3IONS-SCNC: 6 MMOL/L (ref 7–16)
BILIRUB UR QL STRIP: NEGATIVE
BUN SERPL-MCNC: 13 MG/DL (ref 7–18)
BUN/CREAT SERPL: 16 (ref 6–20)
CALCIUM SERPL-MCNC: 9.9 MG/DL (ref 8.5–10.1)
CHLORIDE SERPL-SCNC: 106 MMOL/L (ref 98–107)
CLARITY UR: CLEAR
CO2 SERPL-SCNC: 32 MMOL/L (ref 21–32)
COLOR UR: ABNORMAL
CREAT SERPL-MCNC: 0.83 MG/DL (ref 0.55–1.02)
EGFR (NO RACE VARIABLE) (RUSH/TITUS): 74 ML/MIN/1.73M2
GLUCOSE SERPL-MCNC: 109 MG/DL (ref 74–106)
GLUCOSE UR STRIP-MCNC: NORMAL MG/DL
KETONES UR STRIP-SCNC: NEGATIVE MG/DL
LEUKOCYTE ESTERASE UR QL STRIP: ABNORMAL
MUCOUS, UA: ABNORMAL /LPF
NITRITE UR QL STRIP: NEGATIVE
PH UR STRIP: 5.5 PH UNITS
POTASSIUM SERPL-SCNC: 4.2 MMOL/L (ref 3.5–5.1)
PROT UR QL STRIP: NEGATIVE
RBC # UR STRIP: NEGATIVE /UL
RBC #/AREA URNS HPF: 1 /HPF
SODIUM SERPL-SCNC: 140 MMOL/L (ref 136–145)
SP GR UR STRIP: 1.02
SQUAMOUS #/AREA URNS LPF: ABNORMAL /HPF
UROBILINOGEN UR STRIP-ACNC: NORMAL MG/DL
WBC #/AREA URNS HPF: 4 /HPF

## 2024-03-05 PROCEDURE — 1101F PT FALLS ASSESS-DOCD LE1/YR: CPT | Mod: ,,, | Performed by: INTERNAL MEDICINE

## 2024-03-05 PROCEDURE — 74018 RADEX ABDOMEN 1 VIEW: CPT | Mod: TC,RHCUB | Performed by: INTERNAL MEDICINE

## 2024-03-05 PROCEDURE — 99214 OFFICE O/P EST MOD 30 MIN: CPT | Mod: ,,, | Performed by: INTERNAL MEDICINE

## 2024-03-05 PROCEDURE — 81001 URINALYSIS AUTO W/SCOPE: CPT | Mod: ,,, | Performed by: CLINICAL MEDICAL LABORATORY

## 2024-03-05 PROCEDURE — 3077F SYST BP >= 140 MM HG: CPT | Mod: ,,, | Performed by: INTERNAL MEDICINE

## 2024-03-05 PROCEDURE — 3079F DIAST BP 80-89 MM HG: CPT | Mod: ,,, | Performed by: INTERNAL MEDICINE

## 2024-03-05 PROCEDURE — 80048 BASIC METABOLIC PNL TOTAL CA: CPT | Mod: ,,, | Performed by: CLINICAL MEDICAL LABORATORY

## 2024-03-05 PROCEDURE — 1159F MED LIST DOCD IN RCRD: CPT | Mod: ,,, | Performed by: INTERNAL MEDICINE

## 2024-03-05 PROCEDURE — 1125F AMNT PAIN NOTED PAIN PRSNT: CPT | Mod: ,,, | Performed by: INTERNAL MEDICINE

## 2024-03-05 PROCEDURE — 3288F FALL RISK ASSESSMENT DOCD: CPT | Mod: ,,, | Performed by: INTERNAL MEDICINE

## 2024-03-05 RX ORDER — PREDNISOLONE ACETATE 10 MG/ML
1 SUSPENSION/ DROPS OPHTHALMIC
COMMUNITY
Start: 2023-12-04

## 2024-03-05 RX ORDER — KETOROLAC TROMETHAMINE 5 MG/ML
1 SOLUTION OPHTHALMIC
COMMUNITY
Start: 2023-12-04

## 2024-03-05 RX ORDER — NITROFURANTOIN 25; 75 MG/1; MG/1
100 CAPSULE ORAL 2 TIMES DAILY
Qty: 14 CAPSULE | Refills: 0 | Status: SHIPPED | OUTPATIENT
Start: 2024-03-05

## 2024-03-05 RX ORDER — LACTULOSE 10 G/15ML
13 SOLUTION ORAL 2 TIMES DAILY
Qty: 200 ML | Refills: 0 | Status: SHIPPED | OUTPATIENT
Start: 2024-03-05

## 2024-03-05 RX ORDER — MOXIFLOXACIN 5 MG/ML
1 SOLUTION/ DROPS OPHTHALMIC
COMMUNITY
Start: 2023-12-04

## 2024-03-05 RX ORDER — CYCLOBENZAPRINE HCL 10 MG
10 TABLET ORAL DAILY
Qty: 10 TABLET | Refills: 2 | Status: SHIPPED | OUTPATIENT
Start: 2024-03-05

## 2024-03-05 NOTE — TELEPHONE ENCOUNTER
----- Message from Joey Wiley MD sent at 3/5/2024 11:02 AM CST -----  Need to see in  1 week please  abnl results     Lactulose 20 cc po bid x 3 days       200 cc     1303 Pt is scheduled to come in on 03/18/24; rx sent to pharmacy on file.

## 2024-03-07 ENCOUNTER — HOSPITAL ENCOUNTER (OUTPATIENT)
Dept: RADIOLOGY | Facility: HOSPITAL | Age: 74
Discharge: HOME | End: 2024-03-07
Attending: ORTHOPAEDIC SURGERY
Payer: MEDICARE

## 2024-03-07 DIAGNOSIS — M25.519 SHOULDER PAIN, UNSPECIFIED CHRONICITY, UNSPECIFIED LATERALITY: ICD-10-CM

## 2024-03-07 PROCEDURE — 73221 MRI JOINT UPR EXTREM W/O DYE: CPT | Mod: 26,RT,, | Performed by: RADIOLOGY

## 2024-03-07 PROCEDURE — 73221 MRI JOINT UPR EXTREM W/O DYE: CPT | Mod: TC,RT

## 2024-03-09 PROBLEM — N39.0 URINARY TRACT INFECTION WITHOUT HEMATURIA: Status: ACTIVE | Noted: 2024-03-09

## 2024-03-09 NOTE — PROGRESS NOTES
Subjective:       Patient ID: Chrissie Cunningham is a 73 y.o. female.    Chief Complaint: Follow-up (3 month follow up) and Hip Pain    Follow-up    Hip Pain       .  Patient with a history of chronic hip pain patient has major depressive disorder patient does have dyslipidemia now today complains of abdominal pain dysuria urinary frequency consistent with a UTI.  Patient also complains of intermittent muscle spasms.    See the detailed plan below    Current Medications:    Current Outpatient Medications:     alendronate (FOSAMAX) 70 MG tablet, Take 1 tablet (70 mg total) by mouth every 7 days., Disp: 12 tablet, Rfl: 3    atorvastatin (LIPITOR) 10 MG tablet, Take 1 tablet (10 mg total) by mouth once daily., Disp: 90 tablet, Rfl: 1    atorvastatin (LIPITOR) 20 MG tablet, , Disp: , Rfl:     cetirizine (ZYRTEC) 10 MG tablet, Take 1 tablet (10 mg total) by mouth once daily., Disp: 30 tablet, Rfl: 11    diphenhydramine HCl (BENADRYL ALLERGY ORAL), Take by mouth., Disp: , Rfl:     EScitalopram oxalate (LEXAPRO) 10 MG tablet, Take 1 tablet (10 mg total) by mouth once daily., Disp: 90 tablet, Rfl: 1    gabapentin (NEURONTIN) 300 MG capsule, TAKE 1 CAPSULE BY MOUTH TWICE DAILY AS NEEDED FOR PAIN, Disp: 60 capsule, Rfl: 0    HYDROcodone-acetaminophen (NORCO) 5-325 mg per tablet, Take 1 tablet by mouth every 4 (four) hours as needed for Pain., Disp: 20 tablet, Rfl: 0    ketorolac 0.5% (ACULAR) 0.5 % Drop, 1 drop., Disp: , Rfl:     moxifloxacin (VIGAMOX) 0.5 % ophthalmic solution, 1 drop., Disp: , Rfl:     naproxen (NAPROSYN) 500 MG tablet, Take 1 tablet (500 mg total) by mouth 2 (two) times daily as needed (for pain)., Disp: 20 tablet, Rfl: 3    NIFEdipine (ADALAT CC) 60 MG TbSR, Take 1 tablet (60 mg total) by mouth once daily., Disp: 90 tablet, Rfl: 1    omeprazole (PRILOSEC) 40 MG capsule, Take 1 capsule (40 mg total) by mouth once daily., Disp: 90 capsule, Rfl: 0    pramipexole (MIRAPEX) 0.25 MG tablet, Take 1 tablet by mouth  "once daily., Disp: , Rfl:     prednisoLONE acetate (PRED FORTE) 1 % DrpS, 1 drop., Disp: , Rfl:     solifenacin (VESICARE) 5 MG tablet, Take 1 tablet (5 mg total) by mouth once daily., Disp: 90 tablet, Rfl: 3    cyclobenzaprine (FLEXERIL) 10 MG tablet, Take 1 tablet (10 mg total) by mouth once daily., Disp: 10 tablet, Rfl: 2    estradioL (ESTRACE) 0.01 % (0.1 mg/gram) vaginal cream, Place 1 g vaginally twice a week. (Patient not taking: Reported on 10/19/2023), Disp: 1 each, Rfl: 2    famotidine (PEPCID) 20 MG tablet, Take 1 tablet (20 mg total) by mouth 2 (two) times daily. (Patient not taking: Reported on 10/19/2023), Disp: 60 tablet, Rfl: 11    hydrOXYzine HCL (ATARAX) 25 MG tablet, Take 1 tablet (25 mg total) by mouth daily as needed for Itching. (Patient not taking: Reported on 10/19/2023), Disp: 15 tablet, Rfl: 0    lactulose (CHRONULAC) 20 gram/30 mL Soln, Take 20 mLs (13 g total) by mouth 2 (two) times daily., Disp: 200 mL, Rfl: 0    nitrofurantoin, macrocrystal-monohydrate, (MACROBID) 100 MG capsule, Take 1 capsule (100 mg total) by mouth 2 (two) times daily., Disp: 14 capsule, Rfl: 0    Current Facility-Administered Medications:     betamethasone acetate-betamethasone sodium phosphate injection 6 mg, 6 mg, Intramuscular, 1 time in Clinic/HOD, Karime Wells ACNP           Review of Systems             Vitals:    03/05/24 0905 03/05/24 0909   BP: (!) 142/82 (!) 144/81   BP Location: Right arm    Patient Position: Sitting    BP Method: Large (Automatic)    Pulse: 79    Resp: 18    Temp: 97.7 °F (36.5 °C)    TempSrc: Temporal    SpO2: 98%    Weight: 82.6 kg (182 lb)    Height: 5' 3" (1.6 m)         Physical Exam  Vitals and nursing note reviewed.   Constitutional:       Appearance: Normal appearance. She is obese.   Cardiovascular:      Rate and Rhythm: Normal rate and regular rhythm.      Pulses: Normal pulses.      Heart sounds: Normal heart sounds.   Pulmonary:      Effort: Pulmonary effort is normal. "      Breath sounds: Normal breath sounds.   Abdominal:      General: Abdomen is flat. Bowel sounds are normal.      Palpations: Abdomen is soft.   Musculoskeletal:         General: Normal range of motion.   Skin:     General: Skin is warm and dry.   Neurological:      General: No focal deficit present.      Mental Status: She is alert and oriented to person, place, and time. Mental status is at baseline.           Last Labs:     Office Visit on 03/05/2024   Component Date Value    Color, UA 03/05/2024 Light-Yellow     Clarity, UA 03/05/2024 Clear     pH, UA 03/05/2024 5.5     Leukocytes, UA 03/05/2024 Small (A)     Nitrites, UA 03/05/2024 Negative     Protein, UA 03/05/2024 Negative     Glucose, UA 03/05/2024 Normal     Ketones, UA 03/05/2024 Negative     Urobilinogen, UA 03/05/2024 Normal     Bilirubin, UA 03/05/2024 Negative     Blood, UA 03/05/2024 Negative     Specific Gravity, UA 03/05/2024 1.017     Sodium 03/05/2024 140     Potassium 03/05/2024 4.2     Chloride 03/05/2024 106     CO2 03/05/2024 32     Anion Gap 03/05/2024 6 (L)     Glucose 03/05/2024 109 (H)     BUN 03/05/2024 13     Creatinine 03/05/2024 0.83     BUN/Creatinine Ratio 03/05/2024 16     Calcium 03/05/2024 9.9     eGFR 03/05/2024 74     WBC, UA 03/05/2024 4     RBC, UA 03/05/2024 1     Squamous Epithelial Cell* 03/05/2024 Occasional (A)     Mucous 03/05/2024 Occasional (A)        Last Imaging:  MRI Shoulder Without Contrast Right  Narrative: EXAMINATION:  MRI SHOULDER WITHOUT CONTRAST RIGHT    CLINICAL HISTORY:  Shoulder pain, rotator cuff disorder suspected, xray done; Pain in unspecified shoulder    TECHNIQUE:  Axial, sagittal, and coronal oblique imaging of the right shoulder shoulder is performed using T1, T2, proton density fat-sat and gradient sequences. No contrast was used.    COMPARISON:  None.    FINDINGS:  There is mild degenerative change of the acromioclavicular joint and the joint alignment is normal. The acromion appears normally  formed.    The and infraspinatus tendons are completely torn.  The tendon is retracted with osseous fragment and heterogeneous signal at the end of the tendon.    Biceps tendon is thickened with increased fluid signal.  Partial tearing subscapularis tendon.    There is moderate to severe glenohumeral joint degenerative change.  Multiple osteochondral bodies the suspected in joint recesses, detail limited.    Remaining osseous marrow signal appears within normal limits. No distinct chondral defects are identified.  Impression: Moderate to severe glenohumeral joint and mild acromioclavicular joint osteoarthrosis.  Complete supraspinatus and infraspinatus tears with tendon retraction and either avulsed fragment of bone or osteochondral body adjacent to the into the supraspinatus tendon.  Severe tendinosis intra-articular biceps tendon.  Partial tearing subscapularis tendon.    Electronically signed by: Julito Page  Date:    03/07/2024  Time:    14:39         **Labs and x-rays personally reviewed by me    ** reviewed      Objective:        Assessment:       1. Urinary tract infection without hematuria, site unspecified  Basic Metabolic Panel    X-Ray KUB    Urinalysis, Reflex to Urine Culture    Basic Metabolic Panel    Urinalysis, Microscopic      2. Mild episode of recurrent major depressive disorder        3. Dyslipidemia             Plan:         1. Urinary tract infection without hematuria, site unspecified  -     Basic Metabolic Panel; Future; Expected date: 03/05/2024  -     X-Ray KUB; Future; Expected date: 03/05/2024  -     Urinalysis, Reflex to Urine Culture  -     Urinalysis, Microscopic    2. Mild episode of recurrent major depressive disorder  Overview:  I have much improved, will wean the arm Lexapro.  Patient instructed to take it every other day for week then every 3 days for 1 week.      3. Dyslipidemia  Overview:  Refill Lipitor      Other orders  -     cyclobenzaprine (FLEXERIL) 10 MG tablet;  Take 1 tablet (10 mg total) by mouth once daily.  Dispense: 10 tablet; Refill: 2  -     nitrofurantoin, macrocrystal-monohydrate, (MACROBID) 100 MG capsule; Take 1 capsule (100 mg total) by mouth 2 (two) times daily.  Dispense: 14 capsule; Refill: 0  -     lactulose (CHRONULAC) 20 gram/30 mL Soln; Take 20 mLs (13 g total) by mouth 2 (two) times daily.  Dispense: 200 mL; Refill: 0

## 2024-03-11 ENCOUNTER — TELEPHONE (OUTPATIENT)
Dept: ORTHOPEDICS | Facility: CLINIC | Age: 74
End: 2024-03-11
Payer: MEDICARE

## 2024-03-11 RX ORDER — SOLIFENACIN SUCCINATE 5 MG/1
5 TABLET, FILM COATED ORAL
Qty: 90 TABLET | Refills: 0 | Status: SHIPPED | OUTPATIENT
Start: 2024-03-11 | End: 2024-06-04

## 2024-03-11 NOTE — TELEPHONE ENCOUNTER
----- Message from Jayant Mandujano III, MD sent at 3/11/2024  2:55 PM CDT -----  Severe retracted rotator cuff tear really on option would be reverse total shoulder be glad to refer her if she is interested

## 2024-03-12 ENCOUNTER — TELEPHONE (OUTPATIENT)
Dept: ORTHOPEDICS | Facility: CLINIC | Age: 74
End: 2024-03-12
Payer: MEDICARE

## 2024-03-12 NOTE — TELEPHONE ENCOUNTER
SPOKE TO PATIENT ABOUT MRI RESULTS AND SHE IS GOING TO CALL BACK WHEN SHE IS READY TO HAVE SOMETHING DONE.

## 2024-03-18 ENCOUNTER — OFFICE VISIT (OUTPATIENT)
Dept: FAMILY MEDICINE | Facility: CLINIC | Age: 74
End: 2024-03-18
Payer: MEDICARE

## 2024-03-18 VITALS
SYSTOLIC BLOOD PRESSURE: 157 MMHG | TEMPERATURE: 97 F | BODY MASS INDEX: 32.43 KG/M2 | OXYGEN SATURATION: 99 % | HEART RATE: 73 BPM | HEIGHT: 63 IN | WEIGHT: 183 LBS | DIASTOLIC BLOOD PRESSURE: 82 MMHG | RESPIRATION RATE: 18 BRPM

## 2024-03-18 DIAGNOSIS — I10 ESSENTIAL (PRIMARY) HYPERTENSION: Primary | ICD-10-CM

## 2024-03-18 DIAGNOSIS — K59.00 CONSTIPATION, UNSPECIFIED CONSTIPATION TYPE: ICD-10-CM

## 2024-03-18 PROCEDURE — 1159F MED LIST DOCD IN RCRD: CPT | Mod: ,,, | Performed by: INTERNAL MEDICINE

## 2024-03-18 PROCEDURE — 3079F DIAST BP 80-89 MM HG: CPT | Mod: ,,, | Performed by: INTERNAL MEDICINE

## 2024-03-18 PROCEDURE — 1126F AMNT PAIN NOTED NONE PRSNT: CPT | Mod: ,,, | Performed by: INTERNAL MEDICINE

## 2024-03-18 PROCEDURE — 3288F FALL RISK ASSESSMENT DOCD: CPT | Mod: ,,, | Performed by: INTERNAL MEDICINE

## 2024-03-18 PROCEDURE — 3077F SYST BP >= 140 MM HG: CPT | Mod: ,,, | Performed by: INTERNAL MEDICINE

## 2024-03-18 PROCEDURE — 99214 OFFICE O/P EST MOD 30 MIN: CPT | Mod: ,,, | Performed by: INTERNAL MEDICINE

## 2024-03-18 PROCEDURE — 1101F PT FALLS ASSESS-DOCD LE1/YR: CPT | Mod: ,,, | Performed by: INTERNAL MEDICINE

## 2024-03-20 PROBLEM — K59.00 CONSTIPATION: Status: ACTIVE | Noted: 2024-03-20

## 2024-03-20 NOTE — PROGRESS NOTES
Subjective:       Patient ID: Chrissie Cunningham is a 73 y.o. female.    Chief Complaint: Follow-up (2 week fu uti)    Follow-up      .  Patient presents with chronic hypertension chronic obesity for which I recommend diet exercise lifestyle modification will start Linzess for the constipation    Current Medications:    Current Outpatient Medications:     alendronate (FOSAMAX) 70 MG tablet, Take 1 tablet (70 mg total) by mouth every 7 days., Disp: 12 tablet, Rfl: 3    atorvastatin (LIPITOR) 10 MG tablet, Take 1 tablet (10 mg total) by mouth once daily., Disp: 90 tablet, Rfl: 1    atorvastatin (LIPITOR) 20 MG tablet, , Disp: , Rfl:     cetirizine (ZYRTEC) 10 MG tablet, Take 1 tablet (10 mg total) by mouth once daily., Disp: 30 tablet, Rfl: 11    cyclobenzaprine (FLEXERIL) 10 MG tablet, Take 1 tablet (10 mg total) by mouth once daily., Disp: 10 tablet, Rfl: 2    diphenhydramine HCl (BENADRYL ALLERGY ORAL), Take by mouth., Disp: , Rfl:     EScitalopram oxalate (LEXAPRO) 10 MG tablet, Take 1 tablet (10 mg total) by mouth once daily., Disp: 90 tablet, Rfl: 1    gabapentin (NEURONTIN) 300 MG capsule, TAKE 1 CAPSULE BY MOUTH TWICE DAILY AS NEEDED FOR PAIN, Disp: 60 capsule, Rfl: 0    HYDROcodone-acetaminophen (NORCO) 5-325 mg per tablet, Take 1 tablet by mouth every 4 (four) hours as needed for Pain., Disp: 20 tablet, Rfl: 0    ketorolac 0.5% (ACULAR) 0.5 % Drop, 1 drop., Disp: , Rfl:     lactulose (CHRONULAC) 20 gram/30 mL Soln, Take 20 mLs (13 g total) by mouth 2 (two) times daily., Disp: 200 mL, Rfl: 0    moxifloxacin (VIGAMOX) 0.5 % ophthalmic solution, 1 drop., Disp: , Rfl:     naproxen (NAPROSYN) 500 MG tablet, Take 1 tablet (500 mg total) by mouth 2 (two) times daily as needed (for pain)., Disp: 20 tablet, Rfl: 3    NIFEdipine (ADALAT CC) 60 MG TbSR, Take 1 tablet (60 mg total) by mouth once daily., Disp: 90 tablet, Rfl: 1    nitrofurantoin, macrocrystal-monohydrate, (MACROBID) 100 MG capsule, Take 1 capsule (100 mg  "total) by mouth 2 (two) times daily., Disp: 14 capsule, Rfl: 0    omeprazole (PRILOSEC) 40 MG capsule, Take 1 capsule (40 mg total) by mouth once daily., Disp: 90 capsule, Rfl: 0    pramipexole (MIRAPEX) 0.25 MG tablet, Take 1 tablet by mouth once daily., Disp: , Rfl:     prednisoLONE acetate (PRED FORTE) 1 % DrpS, 1 drop., Disp: , Rfl:     solifenacin (VESICARE) 5 MG tablet, Take 1 tablet by mouth once daily, Disp: 90 tablet, Rfl: 0    estradioL (ESTRACE) 0.01 % (0.1 mg/gram) vaginal cream, Place 1 g vaginally twice a week. (Patient not taking: Reported on 10/19/2023), Disp: 1 each, Rfl: 2    famotidine (PEPCID) 20 MG tablet, Take 1 tablet (20 mg total) by mouth 2 (two) times daily. (Patient not taking: Reported on 10/19/2023), Disp: 60 tablet, Rfl: 11    hydrOXYzine HCL (ATARAX) 25 MG tablet, Take 1 tablet (25 mg total) by mouth daily as needed for Itching. (Patient not taking: Reported on 10/19/2023), Disp: 15 tablet, Rfl: 0    linaCLOtide (LINZESS) 72 mcg Cap capsule, Take 1 capsule (72 mcg total) by mouth Daily., Disp: 30 capsule, Rfl: 3    Current Facility-Administered Medications:     betamethasone acetate-betamethasone sodium phosphate injection 6 mg, 6 mg, Intramuscular, 1 time in Clinic/HOD, Karime Wells, ACNP           Review of Systems   Gastrointestinal:  Positive for constipation.                Vitals:    03/18/24 1117 03/18/24 1352   BP: (!) 156/77 (!) 157/82   BP Location: Left arm    Patient Position: Sitting    BP Method: Large (Automatic)    Pulse: 73    Resp: 18    Temp: 97.1 °F (36.2 °C)    TempSrc: Temporal    SpO2: 99%    Weight: 83 kg (183 lb)    Height: 5' 3" (1.6 m)         Physical Exam  Vitals and nursing note reviewed.   Constitutional:       Appearance: Normal appearance. She is obese.   Cardiovascular:      Rate and Rhythm: Normal rate and regular rhythm.      Pulses: Normal pulses.      Heart sounds: Normal heart sounds.   Pulmonary:      Effort: Pulmonary effort is normal.     "  Breath sounds: Normal breath sounds.   Abdominal:      General: Abdomen is flat. Bowel sounds are normal.      Palpations: Abdomen is soft.   Musculoskeletal:         General: Normal range of motion.   Skin:     General: Skin is warm and dry.   Neurological:      General: No focal deficit present.      Mental Status: She is alert and oriented to person, place, and time. Mental status is at baseline.           Last Labs:     Office Visit on 03/05/2024   Component Date Value    Color, UA 03/05/2024 Light-Yellow     Clarity, UA 03/05/2024 Clear     pH, UA 03/05/2024 5.5     Leukocytes, UA 03/05/2024 Small (A)     Nitrites, UA 03/05/2024 Negative     Protein, UA 03/05/2024 Negative     Glucose, UA 03/05/2024 Normal     Ketones, UA 03/05/2024 Negative     Urobilinogen, UA 03/05/2024 Normal     Bilirubin, UA 03/05/2024 Negative     Blood, UA 03/05/2024 Negative     Specific Gravity, UA 03/05/2024 1.017     Sodium 03/05/2024 140     Potassium 03/05/2024 4.2     Chloride 03/05/2024 106     CO2 03/05/2024 32     Anion Gap 03/05/2024 6 (L)     Glucose 03/05/2024 109 (H)     BUN 03/05/2024 13     Creatinine 03/05/2024 0.83     BUN/Creatinine Ratio 03/05/2024 16     Calcium 03/05/2024 9.9     eGFR 03/05/2024 74     WBC, UA 03/05/2024 4     RBC, UA 03/05/2024 1     Squamous Epithelial Cell* 03/05/2024 Occasional (A)     Mucous 03/05/2024 Occasional (A)        Last Imaging:  MRI Shoulder Without Contrast Right  Narrative: EXAMINATION:  MRI SHOULDER WITHOUT CONTRAST RIGHT    CLINICAL HISTORY:  Shoulder pain, rotator cuff disorder suspected, xray done; Pain in unspecified shoulder    TECHNIQUE:  Axial, sagittal, and coronal oblique imaging of the right shoulder shoulder is performed using T1, T2, proton density fat-sat and gradient sequences. No contrast was used.    COMPARISON:  None.    FINDINGS:  There is mild degenerative change of the acromioclavicular joint and the joint alignment is normal. The acromion appears normally  formed.    The and infraspinatus tendons are completely torn.  The tendon is retracted with osseous fragment and heterogeneous signal at the end of the tendon.    Biceps tendon is thickened with increased fluid signal.  Partial tearing subscapularis tendon.    There is moderate to severe glenohumeral joint degenerative change.  Multiple osteochondral bodies the suspected in joint recesses, detail limited.    Remaining osseous marrow signal appears within normal limits. No distinct chondral defects are identified.  Impression: Moderate to severe glenohumeral joint and mild acromioclavicular joint osteoarthrosis.  Complete supraspinatus and infraspinatus tears with tendon retraction and either avulsed fragment of bone or osteochondral body adjacent to the into the supraspinatus tendon.  Severe tendinosis intra-articular biceps tendon.  Partial tearing subscapularis tendon.    Electronically signed by: Julito Page  Date:    03/07/2024  Time:    14:39         **Labs and x-rays personally reviewed by me    ** reviewed      Objective:        Assessment:       1. Essential (primary) hypertension        2. Constipation, unspecified constipation type             Plan:         1. Essential (primary) hypertension    2. Constipation, unspecified constipation type    Other orders  -     linaCLOtide (LINZESS) 72 mcg Cap capsule; Take 1 capsule (72 mcg total) by mouth Daily.  Dispense: 30 capsule; Refill: 3

## 2024-03-26 RX ORDER — GABAPENTIN 300 MG/1
300 CAPSULE ORAL 2 TIMES DAILY PRN
Qty: 60 CAPSULE | Refills: 0 | Status: SHIPPED | OUTPATIENT
Start: 2024-03-26

## 2024-04-15 RX ORDER — ALENDRONATE SODIUM 70 MG/1
70 TABLET ORAL
Qty: 12 TABLET | Refills: 0 | Status: SHIPPED | OUTPATIENT
Start: 2024-04-15

## 2024-06-04 RX ORDER — SOLIFENACIN SUCCINATE 5 MG/1
5 TABLET, FILM COATED ORAL
Qty: 90 TABLET | Refills: 0 | Status: SHIPPED | OUTPATIENT
Start: 2024-06-04

## 2024-06-04 RX ORDER — ESCITALOPRAM OXALATE 10 MG/1
10 TABLET ORAL
Qty: 90 TABLET | Refills: 0 | Status: SHIPPED | OUTPATIENT
Start: 2024-06-04

## 2024-06-10 PROBLEM — N39.0 URINARY TRACT INFECTION WITHOUT HEMATURIA: Status: RESOLVED | Noted: 2024-03-09 | Resolved: 2024-06-10

## 2024-06-11 DIAGNOSIS — I10 HYPERTENSION, UNSPECIFIED TYPE: ICD-10-CM

## 2024-06-11 RX ORDER — NIFEDIPINE 60 MG/1
60 TABLET, EXTENDED RELEASE ORAL
Qty: 90 TABLET | Refills: 0 | Status: SHIPPED | OUTPATIENT
Start: 2024-06-11

## 2024-06-24 NOTE — PROGRESS NOTES
"  Chrissie Cunningham presented for a  Medicare AWV and comprehensive Health Risk Assessment today. The following components were reviewed and updated:    Medical history  Family History  Social history  Allergies and Current Medications  Health Risk Assessment  Health Maintenance  Care Team         ** See Completed Assessments for Annual Wellness Visit within the encounter summary.**         The following assessments were completed:  Living Situation  CAGE  Depression Screening  Timed Get Up and Go  Whisper Test  Cognitive Function Screening  Nutrition Screening  ADL Screening  PAQ Screening      Opioid documentation:      Patient does have a current opioid prescription.      Patient accepted further discussion regarding opioid medication use.      Patient is currently taking hydrocodone narcotic for hip pain.        Pain level today is 0/10.       In addition to narcotic pain medications, patient is also using acetaminophen for pain control.       Patient is not followed by a specialist currently for their pain and will not be referred today.       Patient's opioid risk potential based on ORT-OUD tool:       Evgeny each box that applies   No   Yes     Family history of substance abuse   Alcohol [x] []   Illegal drugs [x] []   Rx drugs [x] []     Personal history of substance abuse   Alcohol [x] []   Illegal drugs [x] []   Rx drugs [x] []     Age between 16-45 years   [x]   []     Patient with ADD, OCD, Bipolar disorder, schizoprenia   [x]   []     Patient with depression   [x]   []                         Scoring total                                    0                             Non-opioid treatment options have been discussed today and added to the patient's after visit summary.        Vitals:    07/01/24 0821   BP: 130/72   BP Location: Right arm   Patient Position: Sitting   Pulse: 78   Resp: 16   Temp: 98.4 °F (36.9 °C)   TempSrc: Oral   SpO2: 98%   Weight: 82.6 kg (182 lb)   Height: 5' 3" (1.6 m)     Body mass index " is 32.24 kg/m².  Physical Exam  Constitutional:       General: She is not in acute distress.     Appearance: Normal appearance. She is obese.   HENT:      Head: Normocephalic.      Mouth/Throat:      Mouth: Mucous membranes are moist.   Cardiovascular:      Rate and Rhythm: Normal rate and regular rhythm.      Pulses: Normal pulses.      Heart sounds: Normal heart sounds.   Pulmonary:      Effort: Pulmonary effort is normal. No respiratory distress.      Breath sounds: Normal breath sounds.   Abdominal:      Palpations: Abdomen is soft.      Tenderness: There is no abdominal tenderness.   Musculoskeletal:         General: Normal range of motion.   Skin:     General: Skin is warm and dry.   Neurological:      Mental Status: She is alert and oriented to person, place, and time.   Psychiatric:         Mood and Affect: Mood normal.         Behavior: Behavior normal.             Diagnoses and health risks identified today and associated recommendations/orders:    1. Encounter for subsequent annual wellness visit (AWV) in Medicare patient  Screenings performed, as noted above. Personal preventative testing needs reviewed.  Return for AWV in 12 months or thereafter    2. Essential (primary) hypertension  Chronic - Stable - Followed up by PCP  B/P 130/2 today  Continue heart healthy low sodium diet intake   Continue nifedipine 60 mg po daily    3. Dyslipidemia  Chronic - Stable - Followed by PCP  Lipids: Trig 76, Chol 195, HDL 72,   Continue rosuvastatin 10 mg po every evening       4. Mild episode of recurrent major depressive disorder  Chronic - Stable - Followed by PCP  PHQ-9 score 1, indicating minimal or none  Continue escitalopram 10 mg po daily    5. Gastroesophageal reflux disease without esophagitis  Chronic - Stable  Continue omeprazole 40 mg po daily  Followed by PCP        Aubrey Dickens with a 5-10 year written screening schedule and personal prevention plan. Recommendations were developed using the  USPSTF age appropriate recommendations. Education, counseling, and referrals were provided as needed. After Visit Summary printed and given to patient which includes a list of additional screenings\tests needed.    Follow up in about 1 year (around 7/1/2025).    WILBERT Peters    Covid vaccine - declined, Tetanus vaccine - declined, RSV vaccine - declined but VIS (10/19/2023) given, Pneumonia vaccine - declined, Lipid panel - ordered this visit.    I offered to discuss advanced care planning, including how to pick a person who would make decisions for you if you were unable to make them for yourself, called a health care power of , and what kind of decisions you might make such as use of life sustaining treatments such as ventilators and tube feeding when faced with a life limiting illness recorded on a living will that they will need to know. (How you want to be cared for as you near the end of your natural life)     X  Patient is unwilling to engage in a discussion regarding advance directives at this time.

## 2024-07-01 ENCOUNTER — OFFICE VISIT (OUTPATIENT)
Dept: FAMILY MEDICINE | Facility: CLINIC | Age: 74
End: 2024-07-01
Payer: MEDICARE

## 2024-07-01 VITALS
WEIGHT: 182 LBS | OXYGEN SATURATION: 98 % | TEMPERATURE: 98 F | HEART RATE: 78 BPM | HEIGHT: 63 IN | SYSTOLIC BLOOD PRESSURE: 130 MMHG | RESPIRATION RATE: 16 BRPM | BODY MASS INDEX: 32.25 KG/M2 | DIASTOLIC BLOOD PRESSURE: 72 MMHG

## 2024-07-01 DIAGNOSIS — E78.5 DYSLIPIDEMIA: ICD-10-CM

## 2024-07-01 DIAGNOSIS — F33.0 MILD EPISODE OF RECURRENT MAJOR DEPRESSIVE DISORDER: Chronic | ICD-10-CM

## 2024-07-01 DIAGNOSIS — K21.9 GASTROESOPHAGEAL REFLUX DISEASE WITHOUT ESOPHAGITIS: ICD-10-CM

## 2024-07-01 DIAGNOSIS — Z00.00 ENCOUNTER FOR SUBSEQUENT ANNUAL WELLNESS VISIT (AWV) IN MEDICARE PATIENT: Primary | ICD-10-CM

## 2024-07-01 DIAGNOSIS — I10 ESSENTIAL (PRIMARY) HYPERTENSION: ICD-10-CM

## 2024-07-01 LAB
CHOLEST SERPL-MCNC: 197 MG/DL (ref 0–200)
CHOLEST/HDLC SERPL: 2.7 {RATIO}
HDLC SERPL-MCNC: 73 MG/DL (ref 40–60)
LDLC SERPL CALC-MCNC: 110 MG/DL
LDLC/HDLC SERPL: 1.5 {RATIO}
NONHDLC SERPL-MCNC: 124 MG/DL
TRIGL SERPL-MCNC: 72 MG/DL (ref 35–150)
VLDLC SERPL-MCNC: 14 MG/DL

## 2024-07-01 PROCEDURE — 3288F FALL RISK ASSESSMENT DOCD: CPT | Mod: ,,, | Performed by: NURSE PRACTITIONER

## 2024-07-01 PROCEDURE — 1101F PT FALLS ASSESS-DOCD LE1/YR: CPT | Mod: ,,, | Performed by: NURSE PRACTITIONER

## 2024-07-01 PROCEDURE — 1159F MED LIST DOCD IN RCRD: CPT | Mod: ,,, | Performed by: NURSE PRACTITIONER

## 2024-07-01 PROCEDURE — G0439 PPPS, SUBSEQ VISIT: HCPCS | Mod: ,,, | Performed by: NURSE PRACTITIONER

## 2024-07-01 PROCEDURE — 3075F SYST BP GE 130 - 139MM HG: CPT | Mod: ,,, | Performed by: NURSE PRACTITIONER

## 2024-07-01 PROCEDURE — 3078F DIAST BP <80 MM HG: CPT | Mod: ,,, | Performed by: NURSE PRACTITIONER

## 2024-07-01 PROCEDURE — 80061 LIPID PANEL: CPT | Mod: ,,, | Performed by: CLINICAL MEDICAL LABORATORY

## 2024-07-01 PROCEDURE — 1126F AMNT PAIN NOTED NONE PRSNT: CPT | Mod: ,,, | Performed by: NURSE PRACTITIONER

## 2024-07-01 PROCEDURE — 1160F RVW MEDS BY RX/DR IN RCRD: CPT | Mod: ,,, | Performed by: NURSE PRACTITIONER

## 2024-07-01 PROCEDURE — 1124F ACP DISCUSS-NO DSCNMKR DOCD: CPT | Mod: ,,, | Performed by: NURSE PRACTITIONER

## 2024-07-01 NOTE — PATIENT INSTRUCTIONS
Counseling and Referral of Other Preventative  (Italic type indicates deductible and co-insurance are waived)    Patient Name: Chrissie Cunningham  Today's Date: 7/1/2024    Health Maintenance       Date Due Completion Date    RSV Vaccine (Age 60+ and Pregnant patients) (1 - 1-dose 60+ series) Never done ---    COVID-19 Vaccine (4 - 2023-24 season) 09/01/2023 10/26/2021    TETANUS VACCINE 11/02/2024 (Originally 12/15/1968) ---    Pneumococcal Vaccines (Age 65+) (1 of 1 - PCV) 11/02/2024 (Originally 12/15/2013) ---    Influenza Vaccine (1) 09/01/2024 ---    High Dose Statin 03/18/2025 3/18/2024    DEXA Scan 03/27/2026 3/27/2023    Lipid Panel 06/27/2028 6/27/2023    Colorectal Cancer Screening 06/23/2032 6/23/2022        No orders of the defined types were placed in this encounter.    The following information is provided to all patients.  This information is to help you find resources for any of the problems found today that may be affecting your health:                  Living healthy guide: [facility].gov    Understanding Diabetes: www.diabetes.org      Eating healthy: www.cdc.gov/healthyweight      CDC home safety checklist: www.cdc.gov/steadi/patient.html      Agency on Aging: [employer]org    Alcoholics anonymous (AA): www.aa.org      Physical Activity: www.[place].nih.gov/tf7pukq      Tobacco use: [facility].gov

## 2024-07-03 PROBLEM — Z00.00 ENCOUNTER FOR SUBSEQUENT ANNUAL WELLNESS VISIT (AWV) IN MEDICARE PATIENT: Status: ACTIVE | Noted: 2024-07-03

## 2024-07-18 ENCOUNTER — OFFICE VISIT (OUTPATIENT)
Dept: FAMILY MEDICINE | Facility: CLINIC | Age: 74
End: 2024-07-18
Payer: MEDICARE

## 2024-07-18 VITALS
TEMPERATURE: 98 F | WEIGHT: 182 LBS | HEIGHT: 63 IN | RESPIRATION RATE: 18 BRPM | DIASTOLIC BLOOD PRESSURE: 76 MMHG | HEART RATE: 80 BPM | OXYGEN SATURATION: 96 % | BODY MASS INDEX: 32.25 KG/M2 | SYSTOLIC BLOOD PRESSURE: 152 MMHG

## 2024-07-18 DIAGNOSIS — F33.0 MILD EPISODE OF RECURRENT MAJOR DEPRESSIVE DISORDER: ICD-10-CM

## 2024-07-18 DIAGNOSIS — G62.9 NEUROPATHY: ICD-10-CM

## 2024-07-18 DIAGNOSIS — E78.5 DYSLIPIDEMIA: Primary | ICD-10-CM

## 2024-07-18 DIAGNOSIS — I10 HYPERTENSION, UNSPECIFIED TYPE: ICD-10-CM

## 2024-07-18 DIAGNOSIS — M81.0 OSTEOPOROSIS, UNSPECIFIED OSTEOPOROSIS TYPE, UNSPECIFIED PATHOLOGICAL FRACTURE PRESENCE: ICD-10-CM

## 2024-07-18 DIAGNOSIS — M62.838 MUSCLE SPASMS OF BOTH LOWER EXTREMITIES: ICD-10-CM

## 2024-07-18 PROCEDURE — 99214 OFFICE O/P EST MOD 30 MIN: CPT | Mod: ,,, | Performed by: INTERNAL MEDICINE

## 2024-07-18 PROCEDURE — 1159F MED LIST DOCD IN RCRD: CPT | Mod: ,,, | Performed by: INTERNAL MEDICINE

## 2024-07-18 PROCEDURE — 3078F DIAST BP <80 MM HG: CPT | Mod: ,,, | Performed by: INTERNAL MEDICINE

## 2024-07-18 PROCEDURE — 3077F SYST BP >= 140 MM HG: CPT | Mod: ,,, | Performed by: INTERNAL MEDICINE

## 2024-07-18 PROCEDURE — 1125F AMNT PAIN NOTED PAIN PRSNT: CPT | Mod: ,,, | Performed by: INTERNAL MEDICINE

## 2024-07-18 PROCEDURE — 3288F FALL RISK ASSESSMENT DOCD: CPT | Mod: ,,, | Performed by: INTERNAL MEDICINE

## 2024-07-18 PROCEDURE — 1101F PT FALLS ASSESS-DOCD LE1/YR: CPT | Mod: ,,, | Performed by: INTERNAL MEDICINE

## 2024-07-18 RX ORDER — SOLIFENACIN SUCCINATE 5 MG/1
5 TABLET, FILM COATED ORAL DAILY
Qty: 90 TABLET | Refills: 1 | Status: SHIPPED | OUTPATIENT
Start: 2024-07-18

## 2024-07-18 RX ORDER — GABAPENTIN 300 MG/1
300 CAPSULE ORAL 2 TIMES DAILY PRN
Qty: 60 CAPSULE | Refills: 0 | Status: SHIPPED | OUTPATIENT
Start: 2024-07-18

## 2024-07-18 RX ORDER — NIFEDIPINE 60 MG/1
60 TABLET, EXTENDED RELEASE ORAL DAILY
Qty: 90 TABLET | Refills: 1 | Status: SHIPPED | OUTPATIENT
Start: 2024-07-18

## 2024-07-18 RX ORDER — ESCITALOPRAM OXALATE 10 MG/1
10 TABLET ORAL DAILY
Qty: 90 TABLET | Refills: 1 | Status: SHIPPED | OUTPATIENT
Start: 2024-07-18

## 2024-07-18 RX ORDER — ATORVASTATIN CALCIUM 10 MG/1
10 TABLET, FILM COATED ORAL DAILY
Qty: 90 TABLET | Refills: 1 | Status: SHIPPED | OUTPATIENT
Start: 2024-07-18

## 2024-07-18 RX ORDER — ALENDRONATE SODIUM 70 MG/1
70 TABLET ORAL
Qty: 12 TABLET | Refills: 0 | Status: SHIPPED | OUTPATIENT
Start: 2024-07-18

## 2024-07-18 RX ORDER — CYCLOBENZAPRINE HCL 10 MG
10 TABLET ORAL DAILY
Qty: 10 TABLET | Refills: 2 | Status: SHIPPED | OUTPATIENT
Start: 2024-07-18

## 2024-07-24 PROBLEM — M81.0 OSTEOPOROSIS: Status: ACTIVE | Noted: 2024-07-24

## 2024-07-24 PROBLEM — M62.838 MUSCLE SPASMS OF BOTH LOWER EXTREMITIES: Status: ACTIVE | Noted: 2024-07-24

## 2024-07-24 NOTE — PROGRESS NOTES
Subjective:       Patient ID: Chrissie Cunningham is a 73 y.o. female.    Chief Complaint: Medication Refill and Health Maintenance (Pt refused Care Gaps )    HPI  .  Patient presents with chronic hypertension chronic dyslipidemia osteoporosis, chronic major depressive disorder neuropathy and chronic muscle spasms both lower extremities.  Blood pressure is elevated today patient stated she taken today and that is why she believes her blood pressure is elevated patient stated the blood pressures home is usually normal.  130s over 70s at.    Current Medications:    Current Outpatient Medications:     cetirizine (ZYRTEC) 10 MG tablet, Take 1 tablet (10 mg total) by mouth once daily., Disp: 30 tablet, Rfl: 11    diphenhydramine HCl (BENADRYL ALLERGY ORAL), Take by mouth., Disp: , Rfl:     HYDROcodone-acetaminophen (NORCO) 5-325 mg per tablet, Take 1 tablet by mouth every 4 (four) hours as needed for Pain., Disp: 20 tablet, Rfl: 0    ketorolac 0.5% (ACULAR) 0.5 % Drop, 1 drop., Disp: , Rfl:     linaCLOtide (LINZESS) 72 mcg Cap capsule, Take 1 capsule (72 mcg total) by mouth Daily., Disp: 30 capsule, Rfl: 3    moxifloxacin (VIGAMOX) 0.5 % ophthalmic solution, 1 drop., Disp: , Rfl:     omeprazole (PRILOSEC) 40 MG capsule, Take 1 capsule (40 mg total) by mouth once daily., Disp: 90 capsule, Rfl: 0    pramipexole (MIRAPEX) 0.25 MG tablet, Take 1 tablet by mouth once daily., Disp: , Rfl:     prednisoLONE acetate (PRED FORTE) 1 % DrpS, 1 drop., Disp: , Rfl:     alendronate (FOSAMAX) 70 MG tablet, Take 1 tablet (70 mg total) by mouth every 7 days., Disp: 12 tablet, Rfl: 0    atorvastatin (LIPITOR) 10 MG tablet, Take 1 tablet (10 mg total) by mouth once daily., Disp: 90 tablet, Rfl: 1    atorvastatin (LIPITOR) 20 MG tablet, , Disp: , Rfl:     cyclobenzaprine (FLEXERIL) 10 MG tablet, Take 1 tablet (10 mg total) by mouth once daily., Disp: 10 tablet, Rfl: 2    EScitalopram oxalate (LEXAPRO) 10 MG tablet, Take 1 tablet (10 mg total) by  mouth once daily., Disp: 90 tablet, Rfl: 1    estradioL (ESTRACE) 0.01 % (0.1 mg/gram) vaginal cream, Place 1 g vaginally twice a week. (Patient not taking: Reported on 10/19/2023), Disp: 1 each, Rfl: 2    famotidine (PEPCID) 20 MG tablet, Take 1 tablet (20 mg total) by mouth 2 (two) times daily. (Patient not taking: Reported on 10/19/2023), Disp: 60 tablet, Rfl: 11    gabapentin (NEURONTIN) 300 MG capsule, Take 1 capsule (300 mg total) by mouth 2 (two) times daily as needed., Disp: 60 capsule, Rfl: 0    hydrOXYzine HCL (ATARAX) 25 MG tablet, Take 1 tablet (25 mg total) by mouth daily as needed for Itching. (Patient not taking: Reported on 10/19/2023), Disp: 15 tablet, Rfl: 0    lactulose (CHRONULAC) 20 gram/30 mL Soln, Take 20 mLs (13 g total) by mouth 2 (two) times daily. (Patient not taking: Reported on 7/1/2024), Disp: 200 mL, Rfl: 0    naproxen (NAPROSYN) 500 MG tablet, Take 1 tablet (500 mg total) by mouth 2 (two) times daily as needed (for pain). (Patient not taking: Reported on 7/1/2024), Disp: 20 tablet, Rfl: 3    NIFEdipine (ADALAT CC) 60 MG TbSR, Take 1 tablet (60 mg total) by mouth once daily., Disp: 90 tablet, Rfl: 1    nitrofurantoin, macrocrystal-monohydrate, (MACROBID) 100 MG capsule, Take 1 capsule (100 mg total) by mouth 2 (two) times daily. (Patient not taking: Reported on 7/1/2024), Disp: 14 capsule, Rfl: 0    solifenacin (VESICARE) 5 MG tablet, Take 1 tablet (5 mg total) by mouth once daily., Disp: 90 tablet, Rfl: 1    Current Facility-Administered Medications:     betamethasone acetate-betamethasone sodium phosphate injection 6 mg, 6 mg, Intramuscular, 1 time in Clinic/HOD, Karime Wells ACNP           ROS  Twelve point system reviewed, unremarkable except for stated above in HPI.        Objective:         Vitals:    07/18/24 0917 07/18/24 1045   BP: (!) 154/72 (!) 152/76   BP Location: Left arm    Patient Position: Sitting    BP Method: Large (Automatic)    Pulse: 80    Resp: 18    Temp:  "97.5 °F (36.4 °C)    TempSrc: Temporal    SpO2: 96%    Weight: 82.6 kg (182 lb)    Height: 5' 3" (1.6 m)         Physical Exam     Patient is awake alert oriented person place and  Lungs are clear to auscultation bilaterally no crackles or wheezes   Cardiovascular S1-S2 regular rate and rhythm no murmurs rubs or gallops   Abdomen is soft positive bowel sounds nontender, extremities no clubbing cyanosis edema  Neuro no focal neurological deficits  Skin warm and dry.     Last Labs:     Office Visit on 07/01/2024   Component Date Value    Triglycerides 07/01/2024 72     Cholesterol 07/01/2024 197     HDL Cholesterol 07/01/2024 73 (H)     Cholesterol/HDL Ratio (R* 07/01/2024 2.7     Non-HDL 07/01/2024 124     LDL Calculated 07/01/2024 110     LDL/HDL 07/01/2024 1.5     VLDL 07/01/2024 14        Last Imaging:  MRI Shoulder Without Contrast Right  Narrative: EXAMINATION:  MRI SHOULDER WITHOUT CONTRAST RIGHT    CLINICAL HISTORY:  Shoulder pain, rotator cuff disorder suspected, xray done; Pain in unspecified shoulder    TECHNIQUE:  Axial, sagittal, and coronal oblique imaging of the right shoulder shoulder is performed using T1, T2, proton density fat-sat and gradient sequences. No contrast was used.    COMPARISON:  None.    FINDINGS:  There is mild degenerative change of the acromioclavicular joint and the joint alignment is normal. The acromion appears normally formed.    The and infraspinatus tendons are completely torn.  The tendon is retracted with osseous fragment and heterogeneous signal at the end of the tendon.    Biceps tendon is thickened with increased fluid signal.  Partial tearing subscapularis tendon.    There is moderate to severe glenohumeral joint degenerative change.  Multiple osteochondral bodies the suspected in joint recesses, detail limited.    Remaining osseous marrow signal appears within normal limits. No distinct chondral defects are identified.  Impression: Moderate to severe glenohumeral joint " and mild acromioclavicular joint osteoarthrosis.  Complete supraspinatus and infraspinatus tears with tendon retraction and either avulsed fragment of bone or osteochondral body adjacent to the into the supraspinatus tendon.  Severe tendinosis intra-articular biceps tendon.  Partial tearing subscapularis tendon.    Electronically signed by: Julito Page  Date:    03/07/2024  Time:    14:39         **Labs and x-rays personally reviewed by me    ** reviewed           Assessment & Plan:       1. Dyslipidemia  Overview:  Refill Lipitor      2. Hypertension, unspecified type  -     NIFEdipine (ADALAT CC) 60 MG TbSR; Take 1 tablet (60 mg total) by mouth once daily.  Dispense: 90 tablet; Refill: 1    3. Osteoporosis, unspecified osteoporosis type, unspecified pathological fracture presence    4. Mild episode of recurrent major depressive disorder  Overview:  I have much improved, will wean the arm Lexapro.  Patient instructed to take it every other day for week then every 3 days for 1 week.      5. Neuropathy    6. Muscle spasms of both lower extremities    Other orders  -     alendronate (FOSAMAX) 70 MG tablet; Take 1 tablet (70 mg total) by mouth every 7 days.  Dispense: 12 tablet; Refill: 0  -     atorvastatin (LIPITOR) 10 MG tablet; Take 1 tablet (10 mg total) by mouth once daily.  Dispense: 90 tablet; Refill: 1  -     cyclobenzaprine (FLEXERIL) 10 MG tablet; Take 1 tablet (10 mg total) by mouth once daily.  Dispense: 10 tablet; Refill: 2  -     EScitalopram oxalate (LEXAPRO) 10 MG tablet; Take 1 tablet (10 mg total) by mouth once daily.  Dispense: 90 tablet; Refill: 1  -     gabapentin (NEURONTIN) 300 MG capsule; Take 1 capsule (300 mg total) by mouth 2 (two) times daily as needed.  Dispense: 60 capsule; Refill: 0  -     solifenacin (VESICARE) 5 MG tablet; Take 1 tablet (5 mg total) by mouth once daily.  Dispense: 90 tablet; Refill: 1            Joey Wiley MD

## 2024-08-05 DIAGNOSIS — K21.9 GASTROESOPHAGEAL REFLUX DISEASE WITHOUT ESOPHAGITIS: ICD-10-CM

## 2024-08-06 RX ORDER — OMEPRAZOLE 40 MG/1
40 CAPSULE, DELAYED RELEASE ORAL DAILY
Qty: 90 CAPSULE | Refills: 0 | Status: SHIPPED | OUTPATIENT
Start: 2024-08-06

## 2024-08-20 DIAGNOSIS — Z71.89 COMPLEX CARE COORDINATION: ICD-10-CM

## 2024-09-23 DIAGNOSIS — Z12.31 ENCOUNTER FOR SCREENING MAMMOGRAM FOR MALIGNANT NEOPLASM OF BREAST: Primary | ICD-10-CM

## 2024-09-25 DIAGNOSIS — I10 HYPERTENSION, UNSPECIFIED TYPE: ICD-10-CM

## 2024-10-07 PROBLEM — Z00.00 ENCOUNTER FOR SUBSEQUENT ANNUAL WELLNESS VISIT (AWV) IN MEDICARE PATIENT: Status: RESOLVED | Noted: 2024-07-03 | Resolved: 2024-10-07

## 2024-10-07 RX ORDER — ESCITALOPRAM OXALATE 10 MG/1
10 TABLET ORAL DAILY
Qty: 90 TABLET | Refills: 1 | Status: SHIPPED | OUTPATIENT
Start: 2024-10-07

## 2024-10-07 RX ORDER — GABAPENTIN 300 MG/1
300 CAPSULE ORAL 2 TIMES DAILY PRN
Qty: 60 CAPSULE | Refills: 0 | Status: SHIPPED | OUTPATIENT
Start: 2024-10-07

## 2024-10-07 RX ORDER — NIFEDIPINE 60 MG/1
60 TABLET, EXTENDED RELEASE ORAL DAILY
Qty: 90 TABLET | Refills: 1 | Status: SHIPPED | OUTPATIENT
Start: 2024-10-07

## 2024-10-17 ENCOUNTER — OFFICE VISIT (OUTPATIENT)
Dept: CARDIOLOGY | Facility: CLINIC | Age: 74
End: 2024-10-17
Payer: MEDICARE

## 2024-10-17 VITALS
BODY MASS INDEX: 32.02 KG/M2 | HEART RATE: 67 BPM | DIASTOLIC BLOOD PRESSURE: 78 MMHG | OXYGEN SATURATION: 98 % | WEIGHT: 180.69 LBS | SYSTOLIC BLOOD PRESSURE: 132 MMHG | HEIGHT: 63 IN

## 2024-10-17 DIAGNOSIS — I10 HYPERTENSION, UNSPECIFIED TYPE: Primary | ICD-10-CM

## 2024-10-17 DIAGNOSIS — E78.00 HYPERCHOLESTEROLEMIA: ICD-10-CM

## 2024-10-17 PROCEDURE — 99215 OFFICE O/P EST HI 40 MIN: CPT | Mod: PBBFAC,25 | Performed by: NURSE PRACTITIONER

## 2024-10-17 PROCEDURE — 3075F SYST BP GE 130 - 139MM HG: CPT | Mod: CPTII,,, | Performed by: NURSE PRACTITIONER

## 2024-10-17 PROCEDURE — 3288F FALL RISK ASSESSMENT DOCD: CPT | Mod: CPTII,,, | Performed by: NURSE PRACTITIONER

## 2024-10-17 PROCEDURE — 99999 PR PBB SHADOW E&M-EST. PATIENT-LVL V: CPT | Mod: PBBFAC,,, | Performed by: NURSE PRACTITIONER

## 2024-10-17 PROCEDURE — 3078F DIAST BP <80 MM HG: CPT | Mod: CPTII,,, | Performed by: NURSE PRACTITIONER

## 2024-10-17 PROCEDURE — 93005 ELECTROCARDIOGRAM TRACING: CPT | Mod: PBBFAC | Performed by: INTERNAL MEDICINE

## 2024-10-17 PROCEDURE — 1101F PT FALLS ASSESS-DOCD LE1/YR: CPT | Mod: CPTII,,, | Performed by: NURSE PRACTITIONER

## 2024-10-17 PROCEDURE — 99214 OFFICE O/P EST MOD 30 MIN: CPT | Mod: S$PBB,,, | Performed by: NURSE PRACTITIONER

## 2024-10-17 PROCEDURE — 93010 ELECTROCARDIOGRAM REPORT: CPT | Mod: S$PBB,,, | Performed by: INTERNAL MEDICINE

## 2024-10-17 PROCEDURE — 1160F RVW MEDS BY RX/DR IN RCRD: CPT | Mod: CPTII,,, | Performed by: NURSE PRACTITIONER

## 2024-10-17 PROCEDURE — 1159F MED LIST DOCD IN RCRD: CPT | Mod: CPTII,,, | Performed by: NURSE PRACTITIONER

## 2024-10-17 PROCEDURE — 1126F AMNT PAIN NOTED NONE PRSNT: CPT | Mod: CPTII,,, | Performed by: NURSE PRACTITIONER

## 2024-10-18 NOTE — ASSESSMENT & PLAN NOTE
Lab Results   Component Value Date    CHOL 197 07/01/2024    CHOL 195 06/27/2023    CHOL 268 (H) 06/14/2022     Lab Results   Component Value Date    HDL 73 (H) 07/01/2024    HDL 72 (H) 06/27/2023    HDL 66 (H) 06/14/2022     Lab Results   Component Value Date    LDLCALC 110 07/01/2024    LDLCALC 108 06/27/2023    LDLCALC 185 06/14/2022     Lab Results   Component Value Date    TRIG 72 07/01/2024    TRIG 76 06/27/2023    TRIG 84 06/14/2022       Lab Results   Component Value Date    CHOLHDL 2.7 07/01/2024    CHOLHDL 2.7 06/27/2023    CHOLHDL 4.1 06/14/2022     Lipitor 10 mg po daily  Lipids followed by PCP

## 2024-10-18 NOTE — PROGRESS NOTES
PCP: Joey Wiley MD    Referring Provider:   Chief Complaint   Patient presents with    Hypertension    Edema     Left foot, goes down with elevation.       Subjective:   Chrissie Cunningham is a 73 y.o. female with hx of Htn and HLD,  who presents for routine follow up. She states that she is doing well and has no new issues. She has had some mild edema to her left foot that resolves with elevation     10/19/2023 presents for routine follow up. Patient is doing well and denies complaints. She recently had left shoulder surgery and had no known cardiac issues jalen-op.        Fhx:  Family History   Problem Relation Name Age of Onset    Hypertension Mother      Prostate cancer Father      Diabetes Father      Hypertension Father      Breast cancer Sister      Breast cancer Sister      Hyperlipidemia Neg Hx      Stroke Neg Hx       Shx:   Social History     Socioeconomic History    Marital status:    Tobacco Use    Smoking status: Never     Passive exposure: Never    Smokeless tobacco: Never   Substance and Sexual Activity    Alcohol use: Never    Drug use: Never    Sexual activity: Not Currently     Social Drivers of Health     Financial Resource Strain: Low Risk  (7/1/2024)    Overall Financial Resource Strain (CARDIA)     Difficulty of Paying Living Expenses: Not very hard   Food Insecurity: No Food Insecurity (7/1/2024)    Hunger Vital Sign     Worried About Running Out of Food in the Last Year: Never true     Ran Out of Food in the Last Year: Never true   Transportation Needs: No Transportation Needs (7/1/2024)    PRAPARE - Transportation     Lack of Transportation (Medical): No     Lack of Transportation (Non-Medical): No   Physical Activity: Sufficiently Active (7/1/2024)    Exercise Vital Sign     Days of Exercise per Week: 5 days     Minutes of Exercise per Session: 30 min   Stress: No Stress Concern Present (7/1/2024)    [employer] - Occupational Stress Questionnaire      Feeling of Stress : Only a little   Housing Stability: Low Risk  (7/1/2024)    Housing Stability Vital Sign     Unable to Pay for Housing in the Last Year: No     Homeless in the Last Year: No       EKG   10/17/2024 RSR with sinus arrhythmia; HR 65 bpm; rightward axis; biventricular hypertrophy; when compared with EKG 10/3/2023 no significant change was found.  10/3/2023 sinus  bradycardia HR 59 bpm; o/w normal EKG  10/19/2022 RSR with HR 60 bpm; normal EKG    ECHO No results found for this or any previous visit.          Lab Results   Component Value Date     03/05/2024    K 4.2 03/05/2024     03/05/2024    CO2 32 03/05/2024    BUN 13 03/05/2024    CREATININE 0.83 03/05/2024    CALCIUM 9.9 03/05/2024    ANIONGAP 6 (L) 03/05/2024    ESTGFRAFRICA 72 12/20/2021    EGFRNONAA 76 06/01/2022       Lab Results   Component Value Date    CHOL 197 07/01/2024    CHOL 195 06/27/2023    CHOL 268 (H) 06/14/2022     Lab Results   Component Value Date    HDL 73 (H) 07/01/2024    HDL 72 (H) 06/27/2023    HDL 66 (H) 06/14/2022     Lab Results   Component Value Date    LDLCALC 110 07/01/2024    LDLCALC 108 06/27/2023    LDLCALC 185 06/14/2022     Lab Results   Component Value Date    TRIG 72 07/01/2024    TRIG 76 06/27/2023    TRIG 84 06/14/2022     Lab Results   Component Value Date    CHOLHDL 2.7 07/01/2024    CHOLHDL 2.7 06/27/2023    CHOLHDL 4.1 06/14/2022       Lab Results   Component Value Date    WBC 4.78 10/03/2023    HGB 13.6 10/03/2023    HCT 41.4 10/03/2023    MCV 91.8 10/03/2023     10/03/2023           Current Outpatient Medications:     alendronate (FOSAMAX) 70 MG tablet, Take 1 tablet (70 mg total) by mouth every 7 days., Disp: 12 tablet, Rfl: 0    atorvastatin (LIPITOR) 10 MG tablet, Take 1 tablet (10 mg total) by mouth once daily., Disp: 90 tablet, Rfl: 1    atorvastatin (LIPITOR) 20 MG tablet, , Disp: , Rfl:     cetirizine (ZYRTEC) 10 MG tablet, Take 1 tablet (10 mg total) by mouth once daily.,  Disp: 30 tablet, Rfl: 11    cyclobenzaprine (FLEXERIL) 10 MG tablet, Take 1 tablet (10 mg total) by mouth once daily., Disp: 10 tablet, Rfl: 2    diphenhydramine HCl (BENADRYL ALLERGY ORAL), Take by mouth., Disp: , Rfl:     EScitalopram oxalate (LEXAPRO) 10 MG tablet, Take 1 tablet (10 mg total) by mouth once daily., Disp: 90 tablet, Rfl: 1    estradioL (ESTRACE) 0.01 % (0.1 mg/gram) vaginal cream, Place 1 g vaginally twice a week. (Patient not taking: Reported on 10/19/2023), Disp: 1 each, Rfl: 2    famotidine (PEPCID) 20 MG tablet, Take 1 tablet (20 mg total) by mouth 2 (two) times daily. (Patient not taking: Reported on 10/19/2023), Disp: 60 tablet, Rfl: 11    gabapentin (NEURONTIN) 300 MG capsule, Take 1 capsule (300 mg total) by mouth 2 (two) times daily as needed (for pain)., Disp: 60 capsule, Rfl: 0    HYDROcodone-acetaminophen (NORCO) 5-325 mg per tablet, Take 1 tablet by mouth every 4 (four) hours as needed for Pain., Disp: 20 tablet, Rfl: 0    hydrOXYzine HCL (ATARAX) 25 MG tablet, Take 1 tablet (25 mg total) by mouth daily as needed for Itching. (Patient not taking: Reported on 10/19/2023), Disp: 15 tablet, Rfl: 0    ketorolac 0.5% (ACULAR) 0.5 % Drop, 1 drop., Disp: , Rfl:     lactulose (CHRONULAC) 20 gram/30 mL Soln, Take 20 mLs (13 g total) by mouth 2 (two) times daily. (Patient not taking: Reported on 7/1/2024), Disp: 200 mL, Rfl: 0    linaCLOtide (LINZESS) 72 mcg Cap capsule, Take 1 capsule (72 mcg total) by mouth Daily., Disp: 30 capsule, Rfl: 3    moxifloxacin (VIGAMOX) 0.5 % ophthalmic solution, 1 drop., Disp: , Rfl:     naproxen (NAPROSYN) 500 MG tablet, Take 1 tablet (500 mg total) by mouth 2 (two) times daily as needed (for pain). (Patient not taking: Reported on 7/1/2024), Disp: 20 tablet, Rfl: 3    NIFEdipine (ADALAT CC) 60 MG TbSR, Take 1 tablet (60 mg total) by mouth once daily., Disp: 90 tablet, Rfl: 1    nitrofurantoin, macrocrystal-monohydrate, (MACROBID) 100 MG capsule, Take 1 capsule  "(100 mg total) by mouth 2 (two) times daily. (Patient not taking: Reported on 7/1/2024), Disp: 14 capsule, Rfl: 0    omeprazole (PRILOSEC) 40 MG capsule, Take 1 capsule (40 mg total) by mouth once daily., Disp: 90 capsule, Rfl: 0    pramipexole (MIRAPEX) 0.25 MG tablet, Take 1 tablet by mouth once daily., Disp: , Rfl:     prednisoLONE acetate (PRED FORTE) 1 % DrpS, 1 drop., Disp: , Rfl:     solifenacin (VESICARE) 5 MG tablet, Take 1 tablet (5 mg total) by mouth once daily., Disp: 90 tablet, Rfl: 1    Current Facility-Administered Medications:     betamethasone acetate-betamethasone sodium phosphate injection 6 mg, 6 mg, Intramuscular, 1 time in Clinic/HOD, Karime Wells ACNP  Meds reviewed but not  reconciled.  She did not bring her meds.      Review of Systems   Respiratory:  Negative for shortness of breath.    Cardiovascular:  Negative for chest pain, palpitations, orthopnea, claudication, leg swelling and PND.   Neurological:  Negative for dizziness, loss of consciousness and weakness.           Objective:   /78 (BP Location: Left arm, Patient Position: Sitting)   Pulse 67   Ht 5' 3" (1.6 m)   Wt 82 kg (180 lb 11.2 oz)   SpO2 98%   BMI 32.01 kg/m²     Physical Exam  Vitals and nursing note reviewed.   Constitutional:       Appearance: Normal appearance. She is normal weight.   HENT:      Head: Normocephalic and atraumatic.   Neck:      Vascular: No carotid bruit.   Cardiovascular:      Rate and Rhythm: Normal rate and regular rhythm.      Pulses: Normal pulses.      Heart sounds: Normal heart sounds.   Pulmonary:      Effort: Pulmonary effort is normal.      Breath sounds: Normal breath sounds.   Abdominal:      Palpations: Abdomen is soft.   Musculoskeletal:      Cervical back: Neck supple.      Right lower leg: No edema.      Left lower leg: No edema.   Skin:     General: Skin is warm and dry.      Capillary Refill: Capillary refill takes less than 2 seconds.   Neurological:      General: No " focal deficit present.      Mental Status: She is alert.           Assessment:     1. Hypertension, unspecified type  EKG 12-lead    EKG 12-lead      2. Hypercholesterolemia              Plan:   Hypertension  132/78 mmHg    Hypercholesterolemia  Lab Results   Component Value Date    CHOL 197 07/01/2024    CHOL 195 06/27/2023    CHOL 268 (H) 06/14/2022     Lab Results   Component Value Date    HDL 73 (H) 07/01/2024    HDL 72 (H) 06/27/2023    HDL 66 (H) 06/14/2022     Lab Results   Component Value Date    LDLCALC 110 07/01/2024    LDLCALC 108 06/27/2023    LDLCALC 185 06/14/2022     Lab Results   Component Value Date    TRIG 72 07/01/2024    TRIG 76 06/27/2023    TRIG 84 06/14/2022       Lab Results   Component Value Date    CHOLHDL 2.7 07/01/2024    CHOLHDL 2.7 06/27/2023    CHOLHDL 4.1 06/14/2022     Lipitor 10 mg po daily  Lipids followed by PCP      RTC 1 year

## 2024-10-24 LAB
OHS QRS DURATION: 84 MS
OHS QTC CALCULATION: 401 MS

## 2024-11-18 ENCOUNTER — OFFICE VISIT (OUTPATIENT)
Dept: FAMILY MEDICINE | Facility: CLINIC | Age: 74
End: 2024-11-18
Payer: MEDICARE

## 2024-11-18 VITALS
WEIGHT: 180 LBS | BODY MASS INDEX: 31.89 KG/M2 | HEART RATE: 71 BPM | HEIGHT: 63 IN | RESPIRATION RATE: 18 BRPM | SYSTOLIC BLOOD PRESSURE: 139 MMHG | TEMPERATURE: 97 F | DIASTOLIC BLOOD PRESSURE: 77 MMHG | OXYGEN SATURATION: 98 %

## 2024-11-18 DIAGNOSIS — I10 HYPERTENSION, UNSPECIFIED TYPE: ICD-10-CM

## 2024-11-18 DIAGNOSIS — K21.9 GASTROESOPHAGEAL REFLUX DISEASE WITHOUT ESOPHAGITIS: ICD-10-CM

## 2024-11-18 PROCEDURE — 3288F FALL RISK ASSESSMENT DOCD: CPT | Mod: ,,, | Performed by: INTERNAL MEDICINE

## 2024-11-18 PROCEDURE — 1159F MED LIST DOCD IN RCRD: CPT | Mod: ,,, | Performed by: INTERNAL MEDICINE

## 2024-11-18 PROCEDURE — 3075F SYST BP GE 130 - 139MM HG: CPT | Mod: ,,, | Performed by: INTERNAL MEDICINE

## 2024-11-18 PROCEDURE — 99214 OFFICE O/P EST MOD 30 MIN: CPT | Mod: ,,, | Performed by: INTERNAL MEDICINE

## 2024-11-18 PROCEDURE — 3078F DIAST BP <80 MM HG: CPT | Mod: ,,, | Performed by: INTERNAL MEDICINE

## 2024-11-18 PROCEDURE — 1101F PT FALLS ASSESS-DOCD LE1/YR: CPT | Mod: ,,, | Performed by: INTERNAL MEDICINE

## 2024-11-18 PROCEDURE — 1126F AMNT PAIN NOTED NONE PRSNT: CPT | Mod: ,,, | Performed by: INTERNAL MEDICINE

## 2024-11-18 RX ORDER — SOLIFENACIN SUCCINATE 5 MG/1
5 TABLET, FILM COATED ORAL DAILY
Qty: 90 TABLET | Refills: 1 | Status: SHIPPED | OUTPATIENT
Start: 2024-11-18

## 2024-11-18 RX ORDER — GABAPENTIN 300 MG/1
300 CAPSULE ORAL 2 TIMES DAILY PRN
Qty: 60 CAPSULE | Refills: 0 | Status: SHIPPED | OUTPATIENT
Start: 2024-11-18

## 2024-11-18 RX ORDER — ESCITALOPRAM OXALATE 10 MG/1
10 TABLET ORAL DAILY
Qty: 90 TABLET | Refills: 1 | Status: SHIPPED | OUTPATIENT
Start: 2024-11-18

## 2024-11-18 RX ORDER — OMEPRAZOLE 40 MG/1
40 CAPSULE, DELAYED RELEASE ORAL DAILY
Qty: 90 CAPSULE | Refills: 0 | Status: SHIPPED | OUTPATIENT
Start: 2024-11-18

## 2024-11-18 RX ORDER — NIFEDIPINE 60 MG/1
60 TABLET, EXTENDED RELEASE ORAL DAILY
Qty: 90 TABLET | Refills: 1 | Status: SHIPPED | OUTPATIENT
Start: 2024-11-18

## 2024-11-19 NOTE — PROGRESS NOTES
Subjective:       Patient ID: Chrissie Cunningham is a 73 y.o. female.    Chief Complaint: Dyslipidemia (4 month fu) and Health Maintenance (Pt refuses care gaps)    HPI  .  Patient presents with multiple problems neuropathy depression mild anxiety patient also has chronic neuropathy denies any fever chills chest pain or shortness for breath the neuropathy is in feet.  She does not endorse chest pain or shortness for breath no fever or chills reported.  Current Medications:    Current Outpatient Medications:     alendronate (FOSAMAX) 70 MG tablet, Take 1 tablet (70 mg total) by mouth every 7 days., Disp: 12 tablet, Rfl: 0    atorvastatin (LIPITOR) 10 MG tablet, Take 1 tablet (10 mg total) by mouth once daily., Disp: 90 tablet, Rfl: 1    atorvastatin (LIPITOR) 20 MG tablet, , Disp: , Rfl:     cetirizine (ZYRTEC) 10 MG tablet, Take 1 tablet (10 mg total) by mouth once daily., Disp: 30 tablet, Rfl: 11    cyclobenzaprine (FLEXERIL) 10 MG tablet, Take 1 tablet (10 mg total) by mouth once daily., Disp: 10 tablet, Rfl: 2    diphenhydramine HCl (BENADRYL ALLERGY ORAL), Take by mouth., Disp: , Rfl:     EScitalopram oxalate (LEXAPRO) 10 MG tablet, Take 1 tablet (10 mg total) by mouth once daily., Disp: 90 tablet, Rfl: 1    estradioL (ESTRACE) 0.01 % (0.1 mg/gram) vaginal cream, Place 1 g vaginally twice a week. (Patient not taking: Reported on 11/18/2024), Disp: 1 each, Rfl: 2    famotidine (PEPCID) 20 MG tablet, Take 1 tablet (20 mg total) by mouth 2 (two) times daily. (Patient not taking: Reported on 11/18/2024), Disp: 60 tablet, Rfl: 11    gabapentin (NEURONTIN) 300 MG capsule, Take 1 capsule (300 mg total) by mouth 2 (two) times daily as needed (for pain)., Disp: 60 capsule, Rfl: 0    HYDROcodone-acetaminophen (NORCO) 5-325 mg per tablet, Take 1 tablet by mouth every 4 (four) hours as needed for Pain., Disp: 20 tablet, Rfl: 0    hydrOXYzine HCL (ATARAX) 25 MG tablet, Take 1 tablet (25 mg total) by mouth daily as needed for  "Itching. (Patient not taking: Reported on 11/18/2024), Disp: 15 tablet, Rfl: 0    ketorolac 0.5% (ACULAR) 0.5 % Drop, 1 drop., Disp: , Rfl:     lactulose (CHRONULAC) 20 gram/30 mL Soln, Take 20 mLs (13 g total) by mouth 2 (two) times daily. (Patient not taking: Reported on 11/18/2024), Disp: 200 mL, Rfl: 0    linaCLOtide (LINZESS) 72 mcg Cap capsule, Take 1 capsule (72 mcg total) by mouth Daily., Disp: 30 capsule, Rfl: 3    moxifloxacin (VIGAMOX) 0.5 % ophthalmic solution, 1 drop., Disp: , Rfl:     naproxen (NAPROSYN) 500 MG tablet, Take 1 tablet (500 mg total) by mouth 2 (two) times daily as needed (for pain). (Patient not taking: Reported on 7/1/2024), Disp: 20 tablet, Rfl: 3    NIFEdipine (ADALAT CC) 60 MG TbSR, Take 1 tablet (60 mg total) by mouth once daily., Disp: 90 tablet, Rfl: 1    nitrofurantoin, macrocrystal-monohydrate, (MACROBID) 100 MG capsule, Take 1 capsule (100 mg total) by mouth 2 (two) times daily. (Patient not taking: Reported on 11/18/2024), Disp: 14 capsule, Rfl: 0    omeprazole (PRILOSEC) 40 MG capsule, Take 1 capsule (40 mg total) by mouth once daily., Disp: 90 capsule, Rfl: 0    pramipexole (MIRAPEX) 0.25 MG tablet, Take 1 tablet by mouth once daily., Disp: , Rfl:     prednisoLONE acetate (PRED FORTE) 1 % DrpS, 1 drop., Disp: , Rfl:     solifenacin (VESICARE) 5 MG tablet, Take 1 tablet (5 mg total) by mouth once daily., Disp: 90 tablet, Rfl: 1    Current Facility-Administered Medications:     betamethasone acetate-betamethasone sodium phosphate injection 6 mg, 6 mg, Intramuscular, 1 time in Clinic/HOD, Karime Wells, JESSIEP           ROS  Twelve point system reviewed, unremarkable except for stated above in HPI.        Objective:         Vitals:    11/18/24 1024   BP: 139/77   BP Location: Left arm   Patient Position: Sitting   Pulse: 71   Resp: 18   Temp: 97.2 °F (36.2 °C)   TempSrc: Temporal   SpO2: 98%   Weight: 81.6 kg (180 lb)   Height: 5' 3" (1.6 m)        Physical Exam     Patient " is awake alert oriented person place and  Lungs are clear to auscultation bilaterally no crackles or wheezes   Cardiovascular S1-S2 regular rate and rhythm no murmurs rubs or gallops   Abdomen is soft positive bowel sounds nontender, extremities no clubbing cyanosis edema  Neuro no focal neurological deficits  Skin warm and dry.     Last Labs:     No visits with results within 1 Month(s) from this visit.   Latest known visit with results is:   Office Visit on 10/17/2024   Component Date Value    QRS Duration 10/17/2024 84     [facility] QTC Calculation 10/17/2024 401        Last Imaging:  MRI Shoulder Without Contrast Right  Narrative: EXAMINATION:  MRI SHOULDER WITHOUT CONTRAST RIGHT    CLINICAL HISTORY:  Shoulder pain, rotator cuff disorder suspected, xray done; Pain in unspecified shoulder    TECHNIQUE:  Axial, sagittal, and coronal oblique imaging of the right shoulder shoulder is performed using T1, T2, proton density fat-sat and gradient sequences. No contrast was used.    COMPARISON:  None.    FINDINGS:  There is mild degenerative change of the acromioclavicular joint and the joint alignment is normal. The acromion appears normally formed.    The and infraspinatus tendons are completely torn.  The tendon is retracted with osseous fragment and heterogeneous signal at the end of the tendon.    Biceps tendon is thickened with increased fluid signal.  Partial tearing subscapularis tendon.    There is moderate to severe glenohumeral joint degenerative change.  Multiple osteochondral bodies the suspected in joint recesses, detail limited.    Remaining osseous marrow signal appears within normal limits. No distinct chondral defects are identified.  Impression: Moderate to severe glenohumeral joint and mild acromioclavicular joint osteoarthrosis.  Complete supraspinatus and infraspinatus tears with tendon retraction and either avulsed fragment of bone or osteochondral body adjacent to the into the supraspinatus tendon.   Severe tendinosis intra-articular biceps tendon.  Partial tearing subscapularis tendon.    Electronically signed by: Julito Page  Date:    03/07/2024  Time:    14:39         **Labs and x-rays personally reviewed by me    ** reviewed           Assessment & Plan:       1. Hypertension, unspecified type  -     NIFEdipine (ADALAT CC) 60 MG TbSR; Take 1 tablet (60 mg total) by mouth once daily.  Dispense: 90 tablet; Refill: 1    2. Gastroesophageal reflux disease without esophagitis  -     omeprazole (PRILOSEC) 40 MG capsule; Take 1 capsule (40 mg total) by mouth once daily.  Dispense: 90 capsule; Refill: 0    Urinary incontinence   Chronic constipation   Chronic neuropathy   Chronic major depressive disorder  -     EScitalopram oxalate (LEXAPRO) 10 MG tablet; Take 1 tablet (10 mg total) by mouth once daily.  Dispense: 90 tablet; Refill: 1  -     gabapentin (NEURONTIN) 300 MG capsule; Take 1 capsule (300 mg total) by mouth 2 (two) times daily as needed (for pain).  Dispense: 60 capsule; Refill: 0  -     linaCLOtide (LINZESS) 72 mcg Cap capsule; Take 1 capsule (72 mcg total) by mouth Daily.  Dispense: 30 capsule; Refill: 3  -     solifenacin (VESICARE) 5 MG tablet; Take 1 tablet (5 mg total) by mouth once daily.  Dispense: 90 tablet; Refill: 1            Joey Wiley MD

## 2025-02-21 ENCOUNTER — HOSPITAL ENCOUNTER (OUTPATIENT)
Dept: RADIOLOGY | Facility: HOSPITAL | Age: 75
Discharge: HOME | End: 2025-02-21
Payer: MEDICARE

## 2025-02-21 VITALS — WEIGHT: 180 LBS | BODY MASS INDEX: 33.13 KG/M2 | HEIGHT: 62 IN

## 2025-02-21 DIAGNOSIS — Z12.31 ENCOUNTER FOR SCREENING MAMMOGRAM FOR MALIGNANT NEOPLASM OF BREAST: ICD-10-CM

## 2025-02-21 PROCEDURE — 77063 BREAST TOMOSYNTHESIS BI: CPT | Mod: 26,,, | Performed by: RADIOLOGY

## 2025-02-21 PROCEDURE — 77067 SCR MAMMO BI INCL CAD: CPT | Mod: 26,,, | Performed by: RADIOLOGY

## 2025-02-21 PROCEDURE — 77063 BREAST TOMOSYNTHESIS BI: CPT | Mod: TC

## 2025-04-15 ENCOUNTER — OFFICE VISIT (OUTPATIENT)
Dept: FAMILY MEDICINE | Facility: CLINIC | Age: 75
End: 2025-04-15
Payer: MEDICARE

## 2025-04-15 VITALS
RESPIRATION RATE: 17 BRPM | HEIGHT: 62 IN | OXYGEN SATURATION: 97 % | HEART RATE: 73 BPM | WEIGHT: 180 LBS | SYSTOLIC BLOOD PRESSURE: 188 MMHG | BODY MASS INDEX: 33.13 KG/M2 | DIASTOLIC BLOOD PRESSURE: 83 MMHG | TEMPERATURE: 98 F

## 2025-04-15 DIAGNOSIS — K21.9 GASTROESOPHAGEAL REFLUX DISEASE WITHOUT ESOPHAGITIS: ICD-10-CM

## 2025-04-15 DIAGNOSIS — I10 HYPERTENSION, UNSPECIFIED TYPE: ICD-10-CM

## 2025-04-15 DIAGNOSIS — M54.50 LOW BACK PAIN, UNSPECIFIED BACK PAIN LATERALITY, UNSPECIFIED CHRONICITY, UNSPECIFIED WHETHER SCIATICA PRESENT: Primary | ICD-10-CM

## 2025-04-15 DIAGNOSIS — E78.5 DYSLIPIDEMIA: ICD-10-CM

## 2025-04-15 PROCEDURE — 1101F PT FALLS ASSESS-DOCD LE1/YR: CPT | Mod: ,,, | Performed by: INTERNAL MEDICINE

## 2025-04-15 PROCEDURE — 3077F SYST BP >= 140 MM HG: CPT | Mod: ,,, | Performed by: INTERNAL MEDICINE

## 2025-04-15 PROCEDURE — 99214 OFFICE O/P EST MOD 30 MIN: CPT | Mod: ,,, | Performed by: INTERNAL MEDICINE

## 2025-04-15 PROCEDURE — 1159F MED LIST DOCD IN RCRD: CPT | Mod: ,,, | Performed by: INTERNAL MEDICINE

## 2025-04-15 PROCEDURE — 1126F AMNT PAIN NOTED NONE PRSNT: CPT | Mod: ,,, | Performed by: INTERNAL MEDICINE

## 2025-04-15 PROCEDURE — 3288F FALL RISK ASSESSMENT DOCD: CPT | Mod: ,,, | Performed by: INTERNAL MEDICINE

## 2025-04-15 PROCEDURE — 3079F DIAST BP 80-89 MM HG: CPT | Mod: ,,, | Performed by: INTERNAL MEDICINE

## 2025-04-15 RX ORDER — OMEPRAZOLE 40 MG/1
40 CAPSULE, DELAYED RELEASE ORAL DAILY
Qty: 90 CAPSULE | Refills: 1 | Status: SHIPPED | OUTPATIENT
Start: 2025-04-15

## 2025-04-15 RX ORDER — ACYCLOVIR 400 MG/1
400 TABLET ORAL 2 TIMES DAILY
Qty: 90 TABLET | Refills: 1 | Status: CANCELLED | OUTPATIENT
Start: 2025-04-15

## 2025-04-15 RX ORDER — DICLOFENAC POTASSIUM 50 MG/1
50 TABLET, FILM COATED ORAL 4 TIMES DAILY
COMMUNITY

## 2025-04-15 RX ORDER — ATORVASTATIN CALCIUM 10 MG/1
10 TABLET, FILM COATED ORAL DAILY
Qty: 90 TABLET | Refills: 1 | Status: SHIPPED | OUTPATIENT
Start: 2025-04-15

## 2025-04-15 RX ORDER — GABAPENTIN 300 MG/1
300 CAPSULE ORAL 2 TIMES DAILY PRN
Qty: 90 CAPSULE | Refills: 1 | Status: SHIPPED | OUTPATIENT
Start: 2025-04-15

## 2025-04-15 RX ORDER — ACYCLOVIR 400 MG/1
400 TABLET ORAL 2 TIMES DAILY
COMMUNITY

## 2025-04-15 RX ORDER — NIFEDIPINE 60 MG/1
60 TABLET, EXTENDED RELEASE ORAL DAILY
Qty: 90 TABLET | Refills: 1 | Status: SHIPPED | OUTPATIENT
Start: 2025-04-15

## 2025-04-15 NOTE — PROGRESS NOTES
"Subjective:       Patient ID: Chrissie Cunningham is a 74 y.o. female.    Chief Complaint: Hypertension and Health Maintenance (TETANUS VACCINE Never done/Pneumococcal Vaccines (Age 50+)(1 of 1 - PCV) Never done/RSV Vaccine (Age 60+ and Pregnant patients)(1 - 1-dose 75+ series) Never done/Influenza Vaccine(1) Never done/COVID-19 Vaccine(4 - 2024-25 season) due on 09/01/2024/)    History of Present Illness    CHIEF COMPLAINT:  Patient presents today for follow up after shoulder surgery    SURGICAL HISTORY:  She underwent shoulder surgery on January 23rd.    MUSCULOSKELETAL:  She continues to experience low back pain.         Current Medications:  Current Medications[1]           ROS  Twelve point system reviewed, unremarkable except for stated above in HPI.        Objective:         Vitals:    04/15/25 1028 04/15/25 1135   BP: (!) 165/64 (!) 188/83   BP Location: Left arm    Patient Position: Sitting    Pulse: 73    Resp: 17    Temp: 97.5 °F (36.4 °C)    TempSrc: Temporal    SpO2: 97%    Weight: 81.6 kg (180 lb)    Height: 5' 2" (1.575 m)         Physical Exam     Patient is awake alert oriented person place and  Lungs are clear to auscultation bilaterally no crackles or wheezes   Cardiovascular S1-S2 regular rate and rhythm no murmurs rubs or gallops   Abdomen is soft positive bowel sounds nontender, extremities no clubbing cyanosis edema  Neuro no focal neurological deficits  Skin warm and dry.     Last Labs:     No visits with results within 1 Month(s) from this visit.   Latest known visit with results is:   Office Visit on 10/17/2024   Component Date Value    QRS Duration 10/17/2024 84     [facility]HS QTC Calculation 10/17/2024 401        Last Imaging:  Mammo Digital Screening Bilat w/ Basil  Narrative: Facility:  [address] [address] [phone]    Name: Chrissie Cunningham    MRN: [id number]    Result:  Mammo Digital Screening Bilat w/ Basil    History:  Patient is 74 y.o. and is seen for " a screening mammogram.    Films Compared:  Compared to: 09/26/2023 Mammo Digital Screening Bilat, 09/20/2022 Mammo   Digital Screening Bilat, and 09/14/2021 Mammo Digital Screening Bilat     Findings:  This procedure was performed using tomosynthesis.   Computer-aided detection was utilized in the interpretation of this   examination.    There are scattered areas of fibroglandular density. There is no evidence   of suspicious masses, microcalcifications or architectural distortion.  Impression:    No mammographic evidence of malignancy.    BI-RADS Category 1: Negative    Recommendation:  Routine screening mammogram in 1 year is recommended.    Your estimated lifetime risk of breast cancer (to age 85) based on   Tyrer-Cuzick risk assessment model is 4.82%.  According to the American   Cancer Society, patients with a lifetime breast cancer risk of 20% or   higher might benefit from supplemental screening tests, such as screening   breast MRI.    Dominguez Dial MD         **Labs and x-rays personally reviewed by me    ** reviewed           Assessment & Plan:   Assessment & Plan    I10 Hypertension, unspecified type  K21.9 Gastroesophageal reflux disease without esophagitis  M06.9 Rheumatoid arthritis, involving unspecified site, unspecified whether rheumatoid factor present    IMPRESSION:  - Assessed ongoing low back pain and recent shoulder surgery.  - Evaluated BP management.  - Considered potential need for pain management follow-up based on pain levels.    I10 HYPERTENSION, UNSPECIFIED TYPE:  - Refilled blood pressure medication.  - Informed the patient that medication will be ready for pickup at 5:30 PM today.    ** DIAGNOSES NOT IN VISIT DX OR FOR REVIEW **  M06.9 RHEUMATOID ARTHRITIS, INVOLVING UNSPECIFIED SITE, UNSPECIFIED WHETHER RHEUMATOID FACTOR PRESENT:  - Instructed the patient to not schedule a pain management appointment if pain improves.  - Advised that pain management will contact the patient  if a follow-up visit is needed.  - Refilled pain medications.  - Informed the patient that medications will be ready for pickup at 5:30 PM today.  - Noted that the patient reports ongoing issues with pain, including recent shoulder surgery and low back pain.  - Performed physical exam, including auscultation of heart and lungs.  - Acknowledged ongoing pain issues and discussed potential need for pain management follow-up.           1. Low back pain, unspecified back pain laterality, unspecified chronicity, unspecified whether sciatica present  -     gabapentin (NEURONTIN) 300 MG capsule; Take 1 capsule (300 mg total) by mouth 2 (two) times daily as needed (for pain).  Dispense: 90 capsule; Refill: 1  -     Cancel: Ambulatory referral/consult to Pain Clinic; Future; Expected date: 04/22/2025  -     Ambulatory referral/consult to Pain Clinic; Future; Expected date: 04/22/2025    2. Hypertension, unspecified type  -     NIFEdipine (ADALAT CC) 60 MG TbSR; Take 1 tablet (60 mg total) by mouth once daily.  Dispense: 90 tablet; Refill: 1    3. Gastroesophageal reflux disease without esophagitis  -     omeprazole (PRILOSEC) 40 MG capsule; Take 1 capsule (40 mg total) by mouth once daily.  Dispense: 90 capsule; Refill: 1    4. Dyslipidemia  Overview:  Refill Lipitor    Orders:  -     atorvastatin (LIPITOR) 10 MG tablet; Take 1 tablet (10 mg total) by mouth once daily.  Dispense: 90 tablet; Refill: 1            Joey Wiley MD  This note was generated with the assistance of ambient listening technology. Verbal consent was obtained by the patient and accompanying visitor(s) for the recording of patient appointment to facilitate this note. I attest to having reviewed and edited the generated note for accuracy, though some syntax or spelling errors may persist. Please contact the author of this note for any clarification.            [1]   Current Outpatient Medications:     acyclovir (ZOVIRAX) 400 MG tablet, Take 400 mg  by mouth 2 (two) times daily.  Take 1 tablet two times daily, Disp: , Rfl:     alendronate (FOSAMAX) 70 MG tablet, Take 1 tablet (70 mg total) by mouth every 7 days., Disp: 12 tablet, Rfl: 0    cetirizine (ZYRTEC) 10 MG tablet, Take 1 tablet (10 mg total) by mouth once daily., Disp: 30 tablet, Rfl: 11    cyclobenzaprine (FLEXERIL) 10 MG tablet, Take 1 tablet (10 mg total) by mouth once daily., Disp: 10 tablet, Rfl: 2    diclofenac (CATAFLAM) 50 MG tablet, Take 50 mg by mouth 4 (four) times daily. Take 1 tablet by mouth four times daily, Disp: , Rfl:     diphenhydramine HCl (BENADRYL ALLERGY ORAL), Take by mouth., Disp: , Rfl:     EScitalopram oxalate (LEXAPRO) 10 MG tablet, Take 1 tablet (10 mg total) by mouth once daily., Disp: 90 tablet, Rfl: 1    HYDROcodone-acetaminophen (NORCO) 5-325 mg per tablet, Take 1 tablet by mouth every 4 (four) hours as needed for Pain., Disp: 20 tablet, Rfl: 0    ketorolac 0.5% (ACULAR) 0.5 % Drop, 1 drop., Disp: , Rfl:     linaCLOtide (LINZESS) 72 mcg Cap capsule, Take 1 capsule (72 mcg total) by mouth Daily., Disp: 30 capsule, Rfl: 3    moxifloxacin (VIGAMOX) 0.5 % ophthalmic solution, 1 drop., Disp: , Rfl:     pramipexole (MIRAPEX) 0.25 MG tablet, Take 1 tablet by mouth once daily., Disp: , Rfl:     prednisoLONE acetate (PRED FORTE) 1 % DrpS, 1 drop., Disp: , Rfl:     solifenacin (VESICARE) 5 MG tablet, Take 1 tablet (5 mg total) by mouth once daily., Disp: 90 tablet, Rfl: 1    atorvastatin (LIPITOR) 10 MG tablet, Take 1 tablet (10 mg total) by mouth once daily., Disp: 90 tablet, Rfl: 1    atorvastatin (LIPITOR) 20 MG tablet, , Disp: , Rfl:     estradioL (ESTRACE) 0.01 % (0.1 mg/gram) vaginal cream, Place 1 g vaginally twice a week. (Patient not taking: Reported on 10/19/2023), Disp: 1 each, Rfl: 2    famotidine (PEPCID) 20 MG tablet, Take 1 tablet (20 mg total) by mouth 2 (two) times daily. (Patient not taking: Reported on 10/19/2023), Disp: 60 tablet, Rfl: 11    gabapentin  (NEURONTIN) 300 MG capsule, Take 1 capsule (300 mg total) by mouth 2 (two) times daily as needed (for pain)., Disp: 90 capsule, Rfl: 1    hydrOXYzine HCL (ATARAX) 25 MG tablet, Take 1 tablet (25 mg total) by mouth daily as needed for Itching. (Patient not taking: Reported on 10/19/2023), Disp: 15 tablet, Rfl: 0    lactulose (CHRONULAC) 20 gram/30 mL Soln, Take 20 mLs (13 g total) by mouth 2 (two) times daily. (Patient not taking: Reported on 7/1/2024), Disp: 200 mL, Rfl: 0    naproxen (NAPROSYN) 500 MG tablet, Take 1 tablet (500 mg total) by mouth 2 (two) times daily as needed (for pain). (Patient not taking: Reported on 4/15/2025), Disp: 20 tablet, Rfl: 3    NIFEdipine (ADALAT CC) 60 MG TbSR, Take 1 tablet (60 mg total) by mouth once daily., Disp: 90 tablet, Rfl: 1    nitrofurantoin, macrocrystal-monohydrate, (MACROBID) 100 MG capsule, Take 1 capsule (100 mg total) by mouth 2 (two) times daily. (Patient not taking: Reported on 7/1/2024), Disp: 14 capsule, Rfl: 0    omeprazole (PRILOSEC) 40 MG capsule, Take 1 capsule (40 mg total) by mouth once daily., Disp: 90 capsule, Rfl: 1    Current Facility-Administered Medications:     betamethasone acetate-betamethasone sodium phosphate injection 6 mg, 6 mg, Intramuscular, 1 time in Clinic[facility]HOD[facility]

## 2025-07-02 NOTE — PROGRESS NOTES
"  Chrissie Cunningham presented for a follow-up Medicare AWV today. The following components were reviewed and updated:    Medical history  Family History  Social history  Allergies and Current Medications  Health Risk Assessment  Health Maintenance  Care Team    **See Completed Assessments for Annual Wellness visit with in the encounter summary    The following assessments were completed:  Depression Screening  Cognitive function Screening  Timed Get Up Test  Whisper Test      Opioid documentation:      Patient does not have a current opioid prescription.          Vitals:    07/07/25 0837   BP: 120/70   Pulse: 64   Resp: 14   Temp: 97.8 °F (36.6 °C)   TempSrc: Oral   SpO2: 96%   Weight: 80.6 kg (177 lb 9.6 oz)   Height: 5' 2" (1.575 m)     Body mass index is 32.48 kg/m².       Physical Exam  Constitutional:       Appearance: Normal appearance. She is obese.   HENT:      Head: Normocephalic.   Cardiovascular:      Rate and Rhythm: Normal rate and regular rhythm.      Pulses: Normal pulses.      Heart sounds: Normal heart sounds.   Pulmonary:      Effort: Pulmonary effort is normal. No respiratory distress.      Breath sounds: Normal breath sounds. No wheezing, rhonchi or rales.   Abdominal:      Palpations: Abdomen is soft.      Tenderness: There is no abdominal tenderness.   Musculoskeletal:         General: Normal range of motion.      Cervical back: Neck supple.   Skin:     General: Skin is warm and dry.   Neurological:      Mental Status: She is alert and oriented to person, place, and time.   Psychiatric:         Mood and Affect: Mood normal.         Behavior: Behavior normal.       Diagnoses and health risks identified today and associated recommendations/orders:    1. Encounter for subsequent annual wellness visit (AWV) in Medicare patient  Screenings performed, as noted above. Personal preventative testing needs reviewed.  Return for AWV in 12 months or thereafter     2. Dyslipidemia  Chronic - Stable - Followed by " PCP  Lipids: Trig 72, Chol 197, HDL 73,   Continue rosuvastatin 10 mg po every evening    3. Essential (primary) hypertension  Chronic - Stable - Followed up by PCP  B/P 120/270, HR 64 today  Continue heart healthy low sodium diet intake   Continue nifedipine 60 mg po daily    4. Neuropathy  Chronic - Stable - followed by PCP  Continue Gabapentin 300 mg po BID as needed    5. Gastroesophageal reflux disease without esophagitis  Chronic - Stable - Followed by PCP  Continue Omeprazole 40 mg po daily     6. OAB (overactive bladder)  Chronic - Stable - Followed by PCP  Pt reports she is no longer taking Vesicare 5 mg po daily    7. Obstructive sleep apnea    8. RLS (restless legs syndrome)  Chronic - Stable - Followed by PCP  Pt reports she is no longer taking mirapex 0.25 mg po daily     9. Bilateral carpal tunnel syndrome    10. Class 1 obesity with body mass index (BMI) of 32.0 to 32.9 in adult, unspecified obesity type, unspecified whether serious comorbidity present  Recommend weight loss  Exercise such as walking for 30 minutes 3-5 days a week    11. Asymptomatic postmenopausal state  Bone density Test         Provided Chrissie with a 5-10 year written screening schedule and personal prevention plan. Recommendations were developed using the USPSTF age appropriate recommendations. Education, counseling, and referrals were provided as needed.  After Visit Summary printed and given to patient which includes a list of additional screenings\tests needed.  Health Maintenance Due   Topic Date Due    Pneumococcal Vaccines (Age 50+) (1 of 1 - PCV) Never done    RSV Vaccine (Age 60+ and Pregnant patients) (1 - 1-dose 75+ series) Never done    COVID-19 Vaccine (4 - 2024-25 season) 09/01/2024    DEXA Scan  03/27/2025    Declines pneumonia, rsv and covid booster vaccines  Dexa scan ordered     Follow up in about 10 weeks (around 9/15/2025), or 9:45 a.m., for in 1 year for annual wellness visit 07/13/2026 10:00 A.M.  .    Adelaiad Chen [facility]    ACE NAVA, BRANDAN      I offered to discuss advanced care planning, including how to pick a person who would make decisions for you if you were unable to make them for yourself, called a health care power of , and what kind of decisions you might make such as use of life sustaining treatments such as ventilators and tube feeding when faced with a life limiting illness recorded on a living will that they will need to know. (How you want to be cared for as you near the end of your natural life)     X Patient is interested in learning more about how to make advanced directives.  I provided them paperwork and offered to discuss this with them.

## 2025-07-07 ENCOUNTER — OFFICE VISIT (OUTPATIENT)
Dept: FAMILY MEDICINE | Facility: CLINIC | Age: 75
End: 2025-07-07
Payer: MEDICARE

## 2025-07-07 VITALS
SYSTOLIC BLOOD PRESSURE: 120 MMHG | HEART RATE: 64 BPM | DIASTOLIC BLOOD PRESSURE: 70 MMHG | OXYGEN SATURATION: 96 % | BODY MASS INDEX: 32.69 KG/M2 | WEIGHT: 177.63 LBS | RESPIRATION RATE: 14 BRPM | TEMPERATURE: 98 F | HEIGHT: 62 IN

## 2025-07-07 DIAGNOSIS — N32.81 OAB (OVERACTIVE BLADDER): Chronic | ICD-10-CM

## 2025-07-07 DIAGNOSIS — Z78.0 ASYMPTOMATIC POSTMENOPAUSAL STATE: ICD-10-CM

## 2025-07-07 DIAGNOSIS — G47.33 OBSTRUCTIVE SLEEP APNEA: ICD-10-CM

## 2025-07-07 DIAGNOSIS — Z00.00 ENCOUNTER FOR SUBSEQUENT ANNUAL WELLNESS VISIT (AWV) IN MEDICARE PATIENT: Primary | ICD-10-CM

## 2025-07-07 DIAGNOSIS — I10 ESSENTIAL (PRIMARY) HYPERTENSION: ICD-10-CM

## 2025-07-07 DIAGNOSIS — G62.9 NEUROPATHY: ICD-10-CM

## 2025-07-07 DIAGNOSIS — G56.03 BILATERAL CARPAL TUNNEL SYNDROME: Chronic | ICD-10-CM

## 2025-07-07 DIAGNOSIS — E66.811 CLASS 1 OBESITY WITH BODY MASS INDEX (BMI) OF 32.0 TO 32.9 IN ADULT, UNSPECIFIED OBESITY TYPE, UNSPECIFIED WHETHER SERIOUS COMORBIDITY PRESENT: ICD-10-CM

## 2025-07-07 DIAGNOSIS — E78.5 DYSLIPIDEMIA: ICD-10-CM

## 2025-07-07 DIAGNOSIS — K21.9 GASTROESOPHAGEAL REFLUX DISEASE WITHOUT ESOPHAGITIS: ICD-10-CM

## 2025-07-07 DIAGNOSIS — G25.81 RLS (RESTLESS LEGS SYNDROME): Chronic | ICD-10-CM

## 2025-07-07 LAB
CHOLEST SERPL-MCNC: 203 MG/DL
CHOLEST/HDLC SERPL: 3 {RATIO}
HDLC SERPL-MCNC: 68 MG/DL (ref 35–60)
LDLC SERPL CALC-MCNC: 113 MG/DL
LDLC/HDLC SERPL: 1.7 {RATIO}
NONHDLC SERPL-MCNC: 135 MG/DL
TRIGL SERPL-MCNC: 110 MG/DL (ref 37–140)
VLDLC SERPL-MCNC: 22 MG/DL

## 2025-07-07 PROCEDURE — 3078F DIAST BP <80 MM HG: CPT | Mod: ,,, | Performed by: NURSE PRACTITIONER

## 2025-07-07 PROCEDURE — G0439 PPPS, SUBSEQ VISIT: HCPCS | Mod: ,,, | Performed by: NURSE PRACTITIONER

## 2025-07-07 PROCEDURE — 1158F ADVNC CARE PLAN TLK DOCD: CPT | Mod: ,,, | Performed by: NURSE PRACTITIONER

## 2025-07-07 PROCEDURE — 3074F SYST BP LT 130 MM HG: CPT | Mod: ,,, | Performed by: NURSE PRACTITIONER

## 2025-07-07 PROCEDURE — 1159F MED LIST DOCD IN RCRD: CPT | Mod: ,,, | Performed by: NURSE PRACTITIONER

## 2025-07-07 PROCEDURE — 80061 LIPID PANEL: CPT | Mod: ,,, | Performed by: CLINICAL MEDICAL LABORATORY

## 2025-07-07 PROCEDURE — 1170F FXNL STATUS ASSESSED: CPT | Mod: ,,, | Performed by: NURSE PRACTITIONER

## 2025-07-07 PROCEDURE — 3288F FALL RISK ASSESSMENT DOCD: CPT | Mod: ,,, | Performed by: NURSE PRACTITIONER

## 2025-07-07 PROCEDURE — 1160F RVW MEDS BY RX/DR IN RCRD: CPT | Mod: ,,, | Performed by: NURSE PRACTITIONER

## 2025-07-07 PROCEDURE — 1101F PT FALLS ASSESS-DOCD LE1/YR: CPT | Mod: ,,, | Performed by: NURSE PRACTITIONER

## 2025-07-07 PROCEDURE — 1125F AMNT PAIN NOTED PAIN PRSNT: CPT | Mod: ,,, | Performed by: NURSE PRACTITIONER

## 2025-07-07 NOTE — PATIENT INSTRUCTIONS
Counseling and Referral of Other Preventative  (Italic type indicates deductible and co-insurance are waived)    Patient Name: Chrissie Cunningham  Today's Date: 7/7/2025    Health Maintenance         Date Due Completion Date    Pneumococcal Vaccines (Age 50+) (1 of 1 - PCV) Never done ---    RSV Vaccine (Age 60+ and Pregnant patients) (1 - 1-dose 75+ series) Never done ---    COVID-19 Vaccine (4 - 2024-25 season) 09/01/2024 10/26/2021    DEXA Scan 03/27/2025 3/27/2023    Influenza Vaccine (1) 09/01/2025 ---    Lipid Panel 07/01/2029 7/1/2024    TETANUS VACCINE 04/04/2033 4/4/2023          Orders Placed This Encounter   Procedures    DXA Bone Density Axial Skeleton 1 or more sites    Lipid Panel     The following information is provided to all patients.  This information is to help you find resources for any of the problems found today that may be affecting your health:                  Living healthy guide: [facility].gov    Understanding Diabetes: www.diabetes.org      Eating healthy: www.cdc.gov/healthyweight      CDC home safety checklist: www.cdc.gov/steadi/patient.html      Agency on Aging: [facility].org    Alcoholics anonymous (AA): www.aa.org      Physical Activity: www.ashleigh.nih.gov/xc8ugnt      Tobacco use: alabamapubBook&Tableealth.gov

## 2025-07-24 ENCOUNTER — HOSPITAL ENCOUNTER (OUTPATIENT)
Dept: RADIOLOGY | Facility: HOSPITAL | Age: 75
Discharge: HOME | End: 2025-07-24
Attending: NURSE PRACTITIONER
Payer: MEDICARE

## 2025-07-24 ENCOUNTER — RESULTS FOLLOW-UP (OUTPATIENT)
Dept: FAMILY MEDICINE | Facility: CLINIC | Age: 75
End: 2025-07-24
Payer: MEDICARE

## 2025-07-24 DIAGNOSIS — Z78.0 ASYMPTOMATIC POSTMENOPAUSAL STATE: ICD-10-CM

## 2025-07-24 PROCEDURE — 77080 DXA BONE DENSITY AXIAL: CPT | Mod: 26,,, | Performed by: NUCLEAR MEDICINE

## 2025-07-24 PROCEDURE — 77080 DXA BONE DENSITY AXIAL: CPT | Mod: TC

## 2025-07-31 ENCOUNTER — EXTERNAL CHRONIC CARE MANAGEMENT (OUTPATIENT)
Dept: FAMILY MEDICINE | Facility: CLINIC | Age: 75
End: 2025-07-31
Payer: MEDICARE

## 2025-07-31 PROCEDURE — 99490 CHRNC CARE MGMT STAFF 1ST 20: CPT | Mod: ,,, | Performed by: NURSE PRACTITIONER

## (undated) DEVICE — GLOVE SURGICAL PROTEXIS PI CLASSIC SIZE 8.0

## (undated) DEVICE — SUT 2-0 VICRYL / CT-1

## (undated) DEVICE — SUTURE ETHILON 4-0 PS2 BLK 18 IN

## (undated) DEVICE — SOL IRRIGATION SALINE 0.9% 1000ML BOTTLE

## (undated) DEVICE — CANISTER SUCTION MEDI-VAC 12L

## (undated) DEVICE — BLADE SHAVER ARTHSCP CUT 3.5MM

## (undated) DEVICE — SYRINGE 10-12CC LURE -LOK TIP

## (undated) DEVICE — SUT PROLENE 4-0 FS-2 BL MON

## (undated) DEVICE — STAPLER SKIN WIDE

## (undated) DEVICE — GLOVE 7.5 PROTEXIS PI BLUE

## (undated) DEVICE — POUCH INSTRUMENT 2 POCKET

## (undated) DEVICE — GLOVE 6.5 PROTEXIS PI BLUE

## (undated) DEVICE — SOLIDIFIER BTL W/TREAT 1500CC

## (undated) DEVICE — PADDING CAST 2IN STERILE

## (undated) DEVICE — TUBING CROSSFLOW INFLOW CASS

## (undated) DEVICE — NEEDLE ECLIPSE 25GX1IN SAFETY

## (undated) DEVICE — APPLICATOR CHLORAPREP ORN 26ML

## (undated) DEVICE — SLING ARM VOGUE MEDIUM W/LOGO

## (undated) DEVICE — GLOVE BIOGEL SKINSENSE PI 8.0

## (undated) DEVICE — BANDAGE ELASTIC 2IN X 5.8YRD NS SELF CLOSE

## (undated) DEVICE — GLOVE BIOGEL SKINSENSE PI 6.5

## (undated) DEVICE — APPLICATOR CHLORAPREP HI-LITE TINTED ORANGE 26ML

## (undated) DEVICE — GLOVE 7.0 PROTEXIS PI BLUE

## (undated) DEVICE — GOWN POLY REINF BRTH SLV XL

## (undated) DEVICE — BANDAGE ESMARK 4X 3YDS STERILE"

## (undated) DEVICE — SOL NACL IRR 1000ML BTL

## (undated) DEVICE — ADHESIVE MASTISOL VIAL 48/BX

## (undated) DEVICE — SUT #2 TI-CRON HGS-21 30IN

## (undated) DEVICE — CANNULA OBT CONICAL TIP 4.5MM

## (undated) DEVICE — GLOVE SURGICAL PROTEXIS PI CLASSIC SIZE 6.5

## (undated) DEVICE — GLOVE SURGICAL PROTEXIS PI CLASSIC SIZE 7.0

## (undated) DEVICE — CDS EXTREMITY

## (undated) DEVICE — STRIP MEDI WND CLSR 1/2X4IN

## (undated) DEVICE — GLOVE SURGICAL PROTEXIS PI BLUE SIZE 6.0

## (undated) DEVICE — SOL NACL IRR 3000ML

## (undated) DEVICE — Device

## (undated) DEVICE — GLOVE BIOGEL SKINSENSE PI 7.0

## (undated) DEVICE — TOURNIQUET CUFF DISP QC 18 INCH

## (undated) DEVICE — STOCKINETTE COTTON 4FTX48IN 2-PLY OFF WHITE STERILE